# Patient Record
Sex: FEMALE | Race: WHITE | Employment: FULL TIME
[De-identification: names, ages, dates, MRNs, and addresses within clinical notes are randomized per-mention and may not be internally consistent; named-entity substitution may affect disease eponyms.]

---

## 2017-01-24 ENCOUNTER — OFFICE VISIT (OUTPATIENT)
Dept: OBGYN | Facility: CLINIC | Age: 47
End: 2017-01-24

## 2017-01-24 VITALS
DIASTOLIC BLOOD PRESSURE: 70 MMHG | WEIGHT: 199.8 LBS | HEIGHT: 62 IN | BODY MASS INDEX: 36.77 KG/M2 | SYSTOLIC BLOOD PRESSURE: 122 MMHG

## 2017-01-24 PROCEDURE — 99212 OFFICE O/P EST SF 10 MIN: CPT | Performed by: OBSTETRICS & GYNECOLOGY

## 2017-01-24 RX ORDER — PHENTERMINE HYDROCHLORIDE 37.5 MG/1
37.5 TABLET ORAL
Qty: 30 TABLET | Refills: 0 | Status: SHIPPED | OUTPATIENT
Start: 2017-01-24 | End: 2017-02-21 | Stop reason: SDUPTHER

## 2017-02-21 ENCOUNTER — OFFICE VISIT (OUTPATIENT)
Dept: OBGYN | Facility: CLINIC | Age: 47
End: 2017-02-21

## 2017-02-21 VITALS
HEIGHT: 62 IN | BODY MASS INDEX: 35.37 KG/M2 | WEIGHT: 192.2 LBS | DIASTOLIC BLOOD PRESSURE: 70 MMHG | SYSTOLIC BLOOD PRESSURE: 138 MMHG

## 2017-02-21 PROCEDURE — 99211 OFF/OP EST MAY X REQ PHY/QHP: CPT | Performed by: OBSTETRICS & GYNECOLOGY

## 2017-02-21 RX ORDER — PHENTERMINE HYDROCHLORIDE 37.5 MG/1
37.5 TABLET ORAL
Qty: 30 TABLET | Refills: 0 | Status: SHIPPED | OUTPATIENT
Start: 2017-02-21 | End: 2017-03-23

## 2017-05-20 ENCOUNTER — HOSPITAL ENCOUNTER (OUTPATIENT)
Age: 47
Discharge: HOME | End: 2017-05-20
Payer: COMMERCIAL

## 2017-05-20 DIAGNOSIS — E78.5 HYPERLIPIDEMIA, UNSPECIFIED HYPERLIPIDEMIA TYPE: ICD-10-CM

## 2017-05-20 DIAGNOSIS — E11.9 TYPE 2 DIABETES MELLITUS NOT AT GOAL (HCC): ICD-10-CM

## 2017-05-20 LAB
ESTIMATED AVERAGE GLUCOSE: 123 MG/DL
HBA1C MFR BLD: 5.9 % (ref 4.8–5.9)

## 2017-05-20 PROCEDURE — 83036 HEMOGLOBIN GLYCOSYLATED A1C: CPT

## 2017-05-20 PROCEDURE — 36415 COLL VENOUS BLD VENIPUNCTURE: CPT

## 2017-06-07 PROBLEM — E78.5 HYPERLIPIDEMIA: Status: ACTIVE | Noted: 2017-06-07

## 2017-06-10 ENCOUNTER — HOSPITAL ENCOUNTER (OUTPATIENT)
Dept: LAB | Age: 47
Discharge: HOME | End: 2017-06-10
Payer: COMMERCIAL

## 2017-06-10 DIAGNOSIS — E78.5 HYPERLIPIDEMIA, UNSPECIFIED HYPERLIPIDEMIA TYPE: ICD-10-CM

## 2017-06-10 DIAGNOSIS — E11.9 TYPE 2 DIABETES MELLITUS NOT AT GOAL (HCC): ICD-10-CM

## 2017-06-10 LAB
-: ABNORMAL
ALT SERPL-CCNC: 8 U/L (ref 5–33)
AMORPHOUS: ABNORMAL
ANION GAP SERPL CALCULATED.3IONS-SCNC: 14 MMOL/L (ref 9–17)
AST SERPL-CCNC: 11 U/L
BACTERIA: ABNORMAL
BILIRUBIN URINE: NEGATIVE
BUN BLDV-MCNC: 16 MG/DL (ref 6–20)
BUN/CREAT BLD: 33 (ref 9–20)
CALCIUM SERPL-MCNC: 8.9 MG/DL (ref 8.6–10.4)
CASTS UA: ABNORMAL /LPF
CHLORIDE BLD-SCNC: 101 MMOL/L (ref 98–107)
CHOLESTEROL, FASTING: 172 MG/DL
CHOLESTEROL/HDL RATIO: 2.2
CO2: 23 MMOL/L (ref 20–31)
COLOR: YELLOW
COMMENT UA: ABNORMAL
CREAT SERPL-MCNC: 0.48 MG/DL (ref 0.5–0.9)
CREATININE URINE: 127.6 MG/DL (ref 28–217)
CRYSTALS, UA: ABNORMAL /HPF
EPITHELIAL CELLS UA: ABNORMAL /HPF (ref 0–25)
GFR AFRICAN AMERICAN: >60 ML/MIN
GFR NON-AFRICAN AMERICAN: >60 ML/MIN
GFR SERPL CREATININE-BSD FRML MDRD: ABNORMAL ML/MIN/{1.73_M2}
GFR SERPL CREATININE-BSD FRML MDRD: ABNORMAL ML/MIN/{1.73_M2}
GLUCOSE BLD-MCNC: 131 MG/DL (ref 70–99)
GLUCOSE URINE: NEGATIVE
HDLC SERPL-MCNC: 80 MG/DL
KETONES, URINE: NEGATIVE
LDL CHOLESTEROL: 50 MG/DL (ref 0–130)
LEUKOCYTE ESTERASE, URINE: NEGATIVE
MICROALBUMIN/CREAT 24H UR: <12 MG/L
MICROALBUMIN/CREAT UR-RTO: 9 MCG/MG CREAT
MUCUS: ABNORMAL
NITRITE, URINE: NEGATIVE
OTHER OBSERVATIONS UA: ABNORMAL
PH UA: 5 (ref 5–9)
POTASSIUM SERPL-SCNC: 4.5 MMOL/L (ref 3.7–5.3)
PROTEIN UA: NEGATIVE
RBC UA: ABNORMAL /HPF (ref 0–2)
RENAL EPITHELIAL, UA: ABNORMAL /HPF
SODIUM BLD-SCNC: 138 MMOL/L (ref 135–144)
SPECIFIC GRAVITY UA: >1.03 (ref 1.01–1.02)
TRICHOMONAS: ABNORMAL
TRIGLYCERIDE, FASTING: 211 MG/DL
TURBIDITY: CLEAR
URINE HGB: NEGATIVE
UROBILINOGEN, URINE: NORMAL
VLDLC SERPL CALC-MCNC: ABNORMAL MG/DL (ref 1–30)
WBC UA: ABNORMAL /HPF (ref 0–5)
YEAST: ABNORMAL

## 2017-06-10 PROCEDURE — 82043 UR ALBUMIN QUANTITATIVE: CPT

## 2017-06-10 PROCEDURE — 36415 COLL VENOUS BLD VENIPUNCTURE: CPT

## 2017-06-10 PROCEDURE — 82570 ASSAY OF URINE CREATININE: CPT

## 2017-06-10 PROCEDURE — 80061 LIPID PANEL: CPT

## 2017-06-10 PROCEDURE — 81001 URINALYSIS AUTO W/SCOPE: CPT

## 2017-06-10 PROCEDURE — 80048 BASIC METABOLIC PNL TOTAL CA: CPT

## 2017-06-10 PROCEDURE — 84460 ALANINE AMINO (ALT) (SGPT): CPT

## 2017-06-10 PROCEDURE — 84450 TRANSFERASE (AST) (SGOT): CPT

## 2017-11-27 DIAGNOSIS — R53.83 FATIGUE, UNSPECIFIED TYPE: ICD-10-CM

## 2017-11-27 RX ORDER — NORGESTIMATE AND ETHINYL ESTRADIOL 0.25-0.035
1 KIT ORAL DAILY
Qty: 1 PACKET | Refills: 12 | Status: CANCELLED | OUTPATIENT
Start: 2017-11-27

## 2017-11-28 DIAGNOSIS — N92.6 IRREGULAR MENSTRUAL CYCLE: Primary | ICD-10-CM

## 2017-11-28 DIAGNOSIS — R53.83 FATIGUE, UNSPECIFIED TYPE: ICD-10-CM

## 2017-11-28 RX ORDER — NORGESTIMATE AND ETHINYL ESTRADIOL 0.25-0.035
1 KIT ORAL DAILY
Qty: 1 PACKET | Refills: 12 | Status: SHIPPED | OUTPATIENT
Start: 2017-11-28 | End: 2018-12-03 | Stop reason: SDUPTHER

## 2017-12-14 ENCOUNTER — HOSPITAL ENCOUNTER (OUTPATIENT)
Dept: LAB | Age: 47
Discharge: HOME | End: 2017-12-14
Payer: COMMERCIAL

## 2017-12-14 ENCOUNTER — HOSPITAL ENCOUNTER (OUTPATIENT)
Dept: CT IMAGING | Age: 47
Discharge: HOME | End: 2017-12-14
Payer: COMMERCIAL

## 2017-12-14 DIAGNOSIS — Z01.812 PRE-OPERATIVE LABORATORY EXAMINATION: ICD-10-CM

## 2017-12-14 DIAGNOSIS — B35.9 TINEA: ICD-10-CM

## 2017-12-14 LAB
BUN BLDV-MCNC: 14 MG/DL (ref 6–20)
CREAT SERPL-MCNC: 0.56 MG/DL (ref 0.5–0.9)
GFR AFRICAN AMERICAN: >60 ML/MIN
GFR NON-AFRICAN AMERICAN: >60 ML/MIN
GFR SERPL CREATININE-BSD FRML MDRD: NORMAL ML/MIN/{1.73_M2}
GFR SERPL CREATININE-BSD FRML MDRD: NORMAL ML/MIN/{1.73_M2}

## 2017-12-14 PROCEDURE — 84520 ASSAY OF UREA NITROGEN: CPT

## 2017-12-14 PROCEDURE — 6360000004 HC RX CONTRAST MEDICATION: Performed by: INTERNAL MEDICINE

## 2017-12-14 PROCEDURE — 82565 ASSAY OF CREATININE: CPT

## 2017-12-14 PROCEDURE — 70470 CT HEAD/BRAIN W/O & W/DYE: CPT

## 2017-12-14 PROCEDURE — 36415 COLL VENOUS BLD VENIPUNCTURE: CPT

## 2017-12-14 RX ADMIN — IOPAMIDOL 100 ML: 612 INJECTION, SOLUTION INTRAVENOUS at 17:47

## 2017-12-16 ENCOUNTER — HOSPITAL ENCOUNTER (OUTPATIENT)
Dept: LAB | Age: 47
Discharge: HOME | End: 2017-12-16
Payer: COMMERCIAL

## 2017-12-16 LAB
BUN BLDV-MCNC: 13 MG/DL (ref 6–20)
CREAT SERPL-MCNC: 0.49 MG/DL (ref 0.5–0.9)
GFR AFRICAN AMERICAN: >60 ML/MIN
GFR NON-AFRICAN AMERICAN: >60 ML/MIN
GFR SERPL CREATININE-BSD FRML MDRD: ABNORMAL ML/MIN/{1.73_M2}
GFR SERPL CREATININE-BSD FRML MDRD: ABNORMAL ML/MIN/{1.73_M2}

## 2017-12-16 PROCEDURE — 82565 ASSAY OF CREATININE: CPT

## 2017-12-16 PROCEDURE — 36415 COLL VENOUS BLD VENIPUNCTURE: CPT

## 2017-12-16 PROCEDURE — 84520 ASSAY OF UREA NITROGEN: CPT

## 2018-01-15 ENCOUNTER — HOSPITAL ENCOUNTER (OUTPATIENT)
Age: 48
Setting detail: SPECIMEN
Discharge: HOME | End: 2018-01-15
Payer: COMMERCIAL

## 2018-01-15 ENCOUNTER — OFFICE VISIT (OUTPATIENT)
Dept: OBGYN | Age: 48
End: 2018-01-15
Payer: COMMERCIAL

## 2018-01-15 VITALS
BODY MASS INDEX: 37.65 KG/M2 | SYSTOLIC BLOOD PRESSURE: 138 MMHG | DIASTOLIC BLOOD PRESSURE: 72 MMHG | WEIGHT: 199.4 LBS | HEIGHT: 61 IN

## 2018-01-15 DIAGNOSIS — Z12.39 SCREENING FOR BREAST CANCER: ICD-10-CM

## 2018-01-15 DIAGNOSIS — Z01.419 WOMEN'S ANNUAL ROUTINE GYNECOLOGICAL EXAMINATION: ICD-10-CM

## 2018-01-15 DIAGNOSIS — Z01.419 WOMEN'S ANNUAL ROUTINE GYNECOLOGICAL EXAMINATION: Primary | ICD-10-CM

## 2018-01-15 PROCEDURE — G0145 SCR C/V CYTO,THINLAYER,RESCR: HCPCS

## 2018-01-15 PROCEDURE — 99396 PREV VISIT EST AGE 40-64: CPT | Performed by: OBSTETRICS & GYNECOLOGY

## 2018-01-15 NOTE — PROGRESS NOTES
YEARLY PHYSICAL    Date of service: 1/15/2018    Radha Avila  Is a 49 y.o.   female    PT's PCP is: Sofia Vu MD     : 1970                                             Subjective:       Patient's last menstrual period was 2017 (exact date). Are your menses regular: yes    OB History   No data available        History   Smoking Status    Never Smoker   Smokeless Tobacco    Never Used        History   Alcohol Use No       Family History   Problem Relation Age of Onset    Diabetes Maternal Grandfather     Hypertension Father     Kidney Cancer Father     Lung Cancer Father     Heart Surgery Father     Heart Disease Father     Diabetes Mother     Hypertension Mother        Allergies: Cat hair extract; Dust mite extract; and Penicillins      Current Outpatient Prescriptions:     atorvastatin (LIPITOR) 10 MG tablet, TAKE 1 TABLET BY MOUTH ONCE A DAY, Disp: 30 tablet, Rfl: 5    lisinopril (PRINIVIL;ZESTRIL) 10 MG tablet, TAKE 1 TABLET BY MOUTH ONCE A DAY, Disp: 30 tablet, Rfl: 5    norgestimate-ethinyl estradiol (ORTHO-CYCLEN, 28,) 0.25-35 MG-MCG per tablet, Take 1 tablet by mouth daily, Disp: 1 packet, Rfl: 12    amLODIPine (NORVASC) 5 MG tablet, TAKE 1 TABLET BY MOUTH ONCE A DAY, Disp: 30 tablet, Rfl: 5    metFORMIN (GLUCOPHAGE) 500 MG tablet, TAKE 1/2 TABLET BY MOUTH ONCE DAILY, Disp: 15 tablet, Rfl: 5    fluocinonide (LIDEX) 0.05 % cream, Apply topically 2 times daily. , Disp: 1 Tube, Rfl: 2    Lancets MISC, 1 each by Does not apply route daily, Disp: 100 each, Rfl: 3    glucose blood VI test strips (ACCU-CHEK DARRELL) strip, 1 each by In Vitro route daily As needed. , Disp: 100 each, Rfl: 3    Blood Glucose Monitoring Suppl (ACCU-CHEK DARRELL PLUS) W/DEVICE KIT, 1 kit by Does not apply route daily, Disp: 1 kit, Rfl: 0    clotrimazole-betamethasone (LOTRISONE) 1-0.05 % cream, Apply topically 2 times daily. ,

## 2018-01-25 LAB — CYTOLOGY REPORT: NORMAL

## 2018-02-07 ENCOUNTER — HOSPITAL ENCOUNTER (OUTPATIENT)
Dept: WOMENS IMAGING | Age: 48
Discharge: HOME | End: 2018-02-09
Payer: COMMERCIAL

## 2018-02-07 DIAGNOSIS — Z12.39 SCREENING FOR BREAST CANCER: ICD-10-CM

## 2018-02-07 PROCEDURE — 77067 SCR MAMMO BI INCL CAD: CPT

## 2018-02-17 ENCOUNTER — HOSPITAL ENCOUNTER (OUTPATIENT)
Dept: LAB | Age: 48
Discharge: HOME | End: 2018-02-17
Payer: COMMERCIAL

## 2018-02-17 DIAGNOSIS — E11.9 TYPE 2 DIABETES MELLITUS NOT AT GOAL (HCC): ICD-10-CM

## 2018-02-17 DIAGNOSIS — E78.5 HYPERLIPIDEMIA, UNSPECIFIED HYPERLIPIDEMIA TYPE: ICD-10-CM

## 2018-02-17 LAB
CHOLESTEROL, FASTING: 179 MG/DL
CHOLESTEROL/HDL RATIO: 2.5
ESTIMATED AVERAGE GLUCOSE: 123 MG/DL
HBA1C MFR BLD: 5.9 % (ref 4.8–5.9)
HDLC SERPL-MCNC: 72 MG/DL
LDL CHOLESTEROL: 61 MG/DL (ref 0–130)
TRIGLYCERIDE, FASTING: 231 MG/DL
VLDLC SERPL CALC-MCNC: ABNORMAL MG/DL (ref 1–30)

## 2018-02-17 PROCEDURE — 80061 LIPID PANEL: CPT

## 2018-02-17 PROCEDURE — 83036 HEMOGLOBIN GLYCOSYLATED A1C: CPT

## 2018-02-17 PROCEDURE — 36415 COLL VENOUS BLD VENIPUNCTURE: CPT

## 2018-07-02 ENCOUNTER — HOSPITAL ENCOUNTER (OUTPATIENT)
Age: 48
Discharge: HOME | End: 2018-07-02
Payer: COMMERCIAL

## 2018-07-02 DIAGNOSIS — G58.9 NERVE ENTRAPMENT: ICD-10-CM

## 2018-07-02 LAB — TSH SERPL DL<=0.05 MIU/L-ACNC: 2.41 MIU/L (ref 0.3–5)

## 2018-07-02 PROCEDURE — 84443 ASSAY THYROID STIM HORMONE: CPT

## 2018-07-02 PROCEDURE — 36415 COLL VENOUS BLD VENIPUNCTURE: CPT

## 2018-07-14 ENCOUNTER — HOSPITAL ENCOUNTER (OUTPATIENT)
Dept: LAB | Age: 48
Discharge: HOME | End: 2018-07-14
Payer: COMMERCIAL

## 2018-07-14 DIAGNOSIS — E78.5 HYPERLIPIDEMIA, UNSPECIFIED HYPERLIPIDEMIA TYPE: ICD-10-CM

## 2018-07-14 DIAGNOSIS — E11.9 TYPE 2 DIABETES MELLITUS NOT AT GOAL (HCC): ICD-10-CM

## 2018-07-14 LAB
-: ABNORMAL
ALT SERPL-CCNC: 16 U/L (ref 5–33)
AMORPHOUS: ABNORMAL
ANION GAP SERPL CALCULATED.3IONS-SCNC: 12 MMOL/L (ref 9–17)
AST SERPL-CCNC: 21 U/L
BACTERIA: ABNORMAL
BILIRUBIN URINE: NEGATIVE
BUN BLDV-MCNC: 16 MG/DL (ref 6–20)
BUN/CREAT BLD: 30 (ref 9–20)
CALCIUM SERPL-MCNC: 8.8 MG/DL (ref 8.6–10.4)
CASTS UA: ABNORMAL /LPF
CHLORIDE BLD-SCNC: 103 MMOL/L (ref 98–107)
CO2: 25 MMOL/L (ref 20–31)
COLOR: YELLOW
COMMENT UA: ABNORMAL
CREAT SERPL-MCNC: 0.54 MG/DL (ref 0.5–0.9)
CREATININE URINE: 51.6 MG/DL (ref 28–217)
CRYSTALS, UA: ABNORMAL /HPF
EPITHELIAL CELLS UA: ABNORMAL /HPF (ref 0–25)
GFR AFRICAN AMERICAN: >60 ML/MIN
GFR NON-AFRICAN AMERICAN: >60 ML/MIN
GFR SERPL CREATININE-BSD FRML MDRD: ABNORMAL ML/MIN/{1.73_M2}
GFR SERPL CREATININE-BSD FRML MDRD: ABNORMAL ML/MIN/{1.73_M2}
GLUCOSE FASTING: 160 MG/DL (ref 70–99)
GLUCOSE URINE: NEGATIVE
KETONES, URINE: NEGATIVE
LEUKOCYTE ESTERASE, URINE: NEGATIVE
MICROALBUMIN/CREAT 24H UR: <12 MG/L
MICROALBUMIN/CREAT UR-RTO: NORMAL MCG/MG CREAT
MUCUS: ABNORMAL
NITRITE, URINE: NEGATIVE
OTHER OBSERVATIONS UA: ABNORMAL
PH UA: 6 (ref 5–9)
POTASSIUM SERPL-SCNC: 4.4 MMOL/L (ref 3.7–5.3)
PROTEIN UA: NEGATIVE
RBC UA: ABNORMAL /HPF (ref 0–2)
RENAL EPITHELIAL, UA: ABNORMAL /HPF
SODIUM BLD-SCNC: 140 MMOL/L (ref 135–144)
SPECIFIC GRAVITY UA: 1.02 (ref 1.01–1.02)
TRICHOMONAS: ABNORMAL
TURBIDITY: CLEAR
URINE HGB: NEGATIVE
UROBILINOGEN, URINE: NORMAL
WBC UA: ABNORMAL /HPF (ref 0–5)
YEAST: ABNORMAL

## 2018-07-14 PROCEDURE — 82570 ASSAY OF URINE CREATININE: CPT

## 2018-07-14 PROCEDURE — 82043 UR ALBUMIN QUANTITATIVE: CPT

## 2018-07-14 PROCEDURE — 84450 TRANSFERASE (AST) (SGOT): CPT

## 2018-07-14 PROCEDURE — 36415 COLL VENOUS BLD VENIPUNCTURE: CPT

## 2018-07-14 PROCEDURE — 84460 ALANINE AMINO (ALT) (SGPT): CPT

## 2018-07-14 PROCEDURE — 81001 URINALYSIS AUTO W/SCOPE: CPT

## 2018-07-14 PROCEDURE — 80048 BASIC METABOLIC PNL TOTAL CA: CPT

## 2018-09-08 ENCOUNTER — HOSPITAL ENCOUNTER (OUTPATIENT)
Dept: LAB | Age: 48
Discharge: HOME | End: 2018-09-08
Payer: COMMERCIAL

## 2018-09-08 DIAGNOSIS — E78.5 HYPERLIPIDEMIA, UNSPECIFIED HYPERLIPIDEMIA TYPE: ICD-10-CM

## 2018-09-08 LAB
CHOLESTEROL, FASTING: 183 MG/DL
CHOLESTEROL/HDL RATIO: 2.5
HDLC SERPL-MCNC: 73 MG/DL
LDL CHOLESTEROL: 65 MG/DL (ref 0–130)
TRIGLYCERIDE, FASTING: 227 MG/DL
VLDLC SERPL CALC-MCNC: ABNORMAL MG/DL (ref 1–30)

## 2018-09-08 PROCEDURE — 80061 LIPID PANEL: CPT

## 2018-09-08 PROCEDURE — 36415 COLL VENOUS BLD VENIPUNCTURE: CPT

## 2018-12-03 DIAGNOSIS — N92.6 IRREGULAR MENSTRUAL CYCLE: ICD-10-CM

## 2018-12-03 RX ORDER — NORGESTIMATE AND ETHINYL ESTRADIOL 0.25-0.035
1 KIT ORAL DAILY
Qty: 1 PACKET | Refills: 12 | Status: SHIPPED | OUTPATIENT
Start: 2018-12-03 | End: 2019-12-04 | Stop reason: SDUPTHER

## 2019-01-21 ENCOUNTER — HOSPITAL ENCOUNTER (OUTPATIENT)
Age: 49
Setting detail: SPECIMEN
Discharge: HOME | End: 2019-01-21
Payer: COMMERCIAL

## 2019-01-21 ENCOUNTER — OFFICE VISIT (OUTPATIENT)
Dept: OBGYN | Age: 49
End: 2019-01-21
Payer: COMMERCIAL

## 2019-01-21 VITALS
BODY MASS INDEX: 37.91 KG/M2 | SYSTOLIC BLOOD PRESSURE: 138 MMHG | HEIGHT: 62 IN | WEIGHT: 206 LBS | DIASTOLIC BLOOD PRESSURE: 64 MMHG

## 2019-01-21 DIAGNOSIS — Z12.39 SCREENING FOR BREAST CANCER: ICD-10-CM

## 2019-01-21 DIAGNOSIS — Z01.419 WOMEN'S ANNUAL ROUTINE GYNECOLOGICAL EXAMINATION: Primary | ICD-10-CM

## 2019-01-21 DIAGNOSIS — Z01.419 WOMEN'S ANNUAL ROUTINE GYNECOLOGICAL EXAMINATION: ICD-10-CM

## 2019-01-21 PROCEDURE — G8484 FLU IMMUNIZE NO ADMIN: HCPCS | Performed by: OBSTETRICS & GYNECOLOGY

## 2019-01-21 PROCEDURE — 99396 PREV VISIT EST AGE 40-64: CPT | Performed by: OBSTETRICS & GYNECOLOGY

## 2019-01-21 PROCEDURE — G0145 SCR C/V CYTO,THINLAYER,RESCR: HCPCS

## 2019-01-21 RX ORDER — INFLUENZA VIRUS VACCINE 15; 15; 15; 15 UG/.5ML; UG/.5ML; UG/.5ML; UG/.5ML
SUSPENSION INTRAMUSCULAR
Refills: 0 | COMMUNITY
Start: 2018-11-02 | End: 2020-02-18

## 2019-01-22 LAB — COMMENT: NORMAL

## 2019-02-05 LAB — CYTOLOGY REPORT: NORMAL

## 2019-03-30 ENCOUNTER — HOSPITAL ENCOUNTER (OUTPATIENT)
Dept: LAB | Age: 49
Discharge: HOME | End: 2019-03-30
Payer: COMMERCIAL

## 2019-03-30 DIAGNOSIS — E78.5 HYPERLIPIDEMIA, UNSPECIFIED HYPERLIPIDEMIA TYPE: ICD-10-CM

## 2019-03-30 LAB
CHOLESTEROL, FASTING: 261 MG/DL
CHOLESTEROL/HDL RATIO: 6.5
HDLC SERPL-MCNC: 40 MG/DL
LDL CHOLESTEROL DIRECT: 35 MG/DL
LDL CHOLESTEROL: ABNORMAL MG/DL (ref 0–130)
TRIGLYCERIDE, FASTING: 1494 MG/DL
VLDLC SERPL CALC-MCNC: ABNORMAL MG/DL (ref 1–30)

## 2019-03-30 PROCEDURE — 83721 ASSAY OF BLOOD LIPOPROTEIN: CPT

## 2019-03-30 PROCEDURE — 36415 COLL VENOUS BLD VENIPUNCTURE: CPT

## 2019-03-30 PROCEDURE — 80061 LIPID PANEL: CPT

## 2019-04-01 ENCOUNTER — HOSPITAL ENCOUNTER (OUTPATIENT)
Age: 49
Discharge: HOME | End: 2019-04-01
Payer: COMMERCIAL

## 2019-04-01 DIAGNOSIS — E78.2 MIXED HYPERLIPIDEMIA: ICD-10-CM

## 2019-04-01 LAB
ALT SERPL-CCNC: 12 U/L (ref 5–33)
AST SERPL-CCNC: 12 U/L

## 2019-04-01 PROCEDURE — 84450 TRANSFERASE (AST) (SGOT): CPT

## 2019-04-01 PROCEDURE — 36415 COLL VENOUS BLD VENIPUNCTURE: CPT

## 2019-04-01 PROCEDURE — 84460 ALANINE AMINO (ALT) (SGPT): CPT

## 2019-04-13 ENCOUNTER — HOSPITAL ENCOUNTER (OUTPATIENT)
Dept: WOMENS IMAGING | Age: 49
Discharge: HOME | End: 2019-04-15
Payer: COMMERCIAL

## 2019-04-13 DIAGNOSIS — Z12.39 SCREENING FOR BREAST CANCER: ICD-10-CM

## 2019-04-13 PROCEDURE — 77063 BREAST TOMOSYNTHESIS BI: CPT

## 2019-07-20 ENCOUNTER — HOSPITAL ENCOUNTER (OUTPATIENT)
Dept: LAB | Age: 49
Discharge: HOME | End: 2019-07-20
Payer: COMMERCIAL

## 2019-07-20 DIAGNOSIS — E78.5 HYPERLIPIDEMIA, UNSPECIFIED HYPERLIPIDEMIA TYPE: ICD-10-CM

## 2019-07-20 DIAGNOSIS — E78.2 MIXED HYPERLIPIDEMIA: ICD-10-CM

## 2019-07-20 LAB
ALT SERPL-CCNC: 26 U/L (ref 5–33)
AST SERPL-CCNC: 28 U/L
CHOLESTEROL, FASTING: 156 MG/DL
CHOLESTEROL/HDL RATIO: 2.8
HDLC SERPL-MCNC: 56 MG/DL
LDL CHOLESTEROL: 50 MG/DL (ref 0–130)
TRIGLYCERIDE, FASTING: 250 MG/DL
VLDLC SERPL CALC-MCNC: ABNORMAL MG/DL (ref 1–30)

## 2019-07-20 PROCEDURE — 84460 ALANINE AMINO (ALT) (SGPT): CPT

## 2019-07-20 PROCEDURE — 80061 LIPID PANEL: CPT

## 2019-07-20 PROCEDURE — 36415 COLL VENOUS BLD VENIPUNCTURE: CPT

## 2019-07-20 PROCEDURE — 84450 TRANSFERASE (AST) (SGOT): CPT

## 2019-12-04 DIAGNOSIS — N92.6 IRREGULAR MENSTRUAL CYCLE: ICD-10-CM

## 2019-12-04 RX ORDER — NORGESTIMATE AND ETHINYL ESTRADIOL 0.25-0.035
1 KIT ORAL DAILY
Qty: 1 PACKET | Refills: 12 | Status: SHIPPED
Start: 2019-12-04 | End: 2020-02-17 | Stop reason: SDUPTHER

## 2020-02-06 ENCOUNTER — HOSPITAL ENCOUNTER (OUTPATIENT)
Age: 50
Setting detail: SPECIMEN
Discharge: HOME | End: 2020-02-06
Payer: COMMERCIAL

## 2020-02-06 ENCOUNTER — OFFICE VISIT (OUTPATIENT)
Dept: OBGYN | Age: 50
End: 2020-02-06
Payer: COMMERCIAL

## 2020-02-06 VITALS
SYSTOLIC BLOOD PRESSURE: 122 MMHG | BODY MASS INDEX: 39.75 KG/M2 | HEIGHT: 62 IN | WEIGHT: 216 LBS | DIASTOLIC BLOOD PRESSURE: 72 MMHG

## 2020-02-06 PROCEDURE — G0145 SCR C/V CYTO,THINLAYER,RESCR: HCPCS

## 2020-02-06 PROCEDURE — 99396 PREV VISIT EST AGE 40-64: CPT | Performed by: OBSTETRICS & GYNECOLOGY

## 2020-02-06 NOTE — PROGRESS NOTES
YEARLY PHYSICAL    Date of service: 2020    Jacob Dear  Is a 47 y.o.   female    PT's PCP is: Rohini Zabala MD     : 1970                                             Subjective:       Patient's last menstrual period was 2020 (approximate). Are your menses regular: yes    OB History   No obstetric history on file.         Social History     Tobacco Use   Smoking Status Never Smoker   Smokeless Tobacco Never Used        Social History     Substance and Sexual Activity   Alcohol Use No       Family History   Problem Relation Age of Onset    Diabetes Maternal Grandfather     Hypertension Father     Kidney Cancer Father     Lung Cancer Father     Heart Surgery Father     Heart Disease Father     Diabetes Mother     Hypertension Mother        Allergies: Cat hair extract; Dust mite extract; and Penicillins      Current Outpatient Medications:     norgestimate-ethinyl estradiol (ORTHO-CYCLEN, 28,) 0.25-35 MG-MCG per tablet, Take 1 tablet by mouth daily, Disp: 1 packet, Rfl: 12    lisinopril (PRINIVIL;ZESTRIL) 10 MG tablet, TAKE 1 TABLET BY MOUTH ONCE A DAY, Disp: 30 tablet, Rfl: 5    amLODIPine (NORVASC) 5 MG tablet, TAKE 1 TABLET BY MOUTH ONCE A DAY, Disp: 30 tablet, Rfl: 5    atorvastatin (LIPITOR) 40 MG tablet, TAKE 1 TABLET BY MOUTH ONCE A DAY, Disp: 30 tablet, Rfl: 5    metFORMIN (GLUCOPHAGE) 500 MG tablet, TAKE 1/2 TABLET BY MOUTH ONCE DAILY, Disp: 15 tablet, Rfl: 5    lisinopril (PRINIVIL;ZESTRIL) 10 MG tablet, TAKE 1 TABLET BY MOUTH ONCE A DAY, Disp: 30 tablet, Rfl: 5    atorvastatin (LIPITOR) 10 MG tablet, TAKE 1 TABLET BY MOUTH ONCE A DAY, Disp: 30 tablet, Rfl: 5    FLUARIX QUADRIVALENT 0.5 ML injection, inject 0.5 milliliter intramuscularly, Disp: , Rfl: 0    lisinopril (PRINIVIL;ZESTRIL) 10 MG tablet, TAKE 1 TABLET BY MOUTH ONCE A DAY, Disp: 30 tablet, Rfl: 5    Lancets MISC, 1 each by Does not and rhythm, no murmurs              Pul:clear to auscultation bilaterally- no wheezes, rales or rhonchi, normal air movement, no respiratory distress      GI: Abdomen soft, non-tender. BS normal. No masses,  No organomegaly, obesity noted, old well-healed scar on lower abdomen           Extremities: normal strength, tone, and muscle mass   Breasts: Breast:normal appearance, no masses or tenderness, Inspection negative, No nipple retraction or dimpling, No nipple discharge or bleeding, No axillary or supraclavicular adenopathy, Normal to palpation without dominant masses   Pelvic Exam: GENITAL/URINARY:  External Genitalia:  General appearance; normal, Hair distribution; normal, Lesions absent  Vagina:  General appearance normal, Estrogen effect normal, Discharge absent, Lesions absent, Pelvic support normal  Cervix:  General appearance normal, Lesions absent, Discharge absent, Tenderness absent, Enlargement absent, Nodularity absent  Uterus:  Size normal, Contour normal, Position normal, Masses absent, Consistency; normal, Support normal, Tenderness absent  [place]: Masses absent, Tenderness absent, Enlargement absent, Nodularity absent                                      Vaginal discharge: no vaginal discharge      Uterus: normal size, anteverted, mobile, non-tender, normal shape and consistency     Ovaries: Nonenlarged nontender           Over 50% of time spent on counseling and care coordination on: see assessment and plan                        Assessment and Plan: Discussion regarding menopause. Patient is to go off the birth control pills at some point in the near future        Diagnosis Orders   1. Encounter for screening mammogram for breast cancer  [place] DIGITAL SCREEN W CAD BILATERAL   2. Well woman exam with routine gynecological exam  PAP SMEAR       I am having Padmini Friend.  [employer] maintain her Lancets, blood glucose test strips, Accu-Chek [place] Plus, lisinopril, Fluarix Quadrivalent, atorvastatin, lisinopril, metFORMIN, atorvastatin, amLODIPine, lisinopril, and norgestimate-ethinyl estradiol.

## 2020-02-16 ENCOUNTER — HOSPITAL ENCOUNTER (EMERGENCY)
Age: 50
Discharge: HOME | End: 2020-02-16
Attending: EMERGENCY MEDICINE
Payer: COMMERCIAL

## 2020-02-16 ENCOUNTER — TELEPHONE (OUTPATIENT)
Dept: OBGYN | Age: 50
End: 2020-02-16

## 2020-02-16 VITALS
OXYGEN SATURATION: 97 % | TEMPERATURE: 97.8 F | BODY MASS INDEX: 39.14 KG/M2 | WEIGHT: 214 LBS | HEART RATE: 95 BPM | SYSTOLIC BLOOD PRESSURE: 191 MMHG | RESPIRATION RATE: 16 BRPM | DIASTOLIC BLOOD PRESSURE: 105 MMHG

## 2020-02-16 LAB
ABSOLUTE EOS #: 0.24 K/UL (ref 0–0.44)
ABSOLUTE IMMATURE GRANULOCYTE: 0.07 K/UL (ref 0–0.3)
ABSOLUTE LYMPH #: 2.9 K/UL (ref 1.1–3.7)
ABSOLUTE MONO #: 0.87 K/UL (ref 0.1–1.2)
ANION GAP SERPL CALCULATED.3IONS-SCNC: 15 MMOL/L (ref 9–17)
BASOPHILS # BLD: 1 % (ref 0–2)
BASOPHILS ABSOLUTE: 0.11 K/UL (ref 0–0.2)
BUN BLDV-MCNC: 10 MG/DL (ref 6–20)
BUN/CREAT BLD: 20 (ref 9–20)
CALCIUM SERPL-MCNC: 8.9 MG/DL (ref 8.6–10.4)
CHLORIDE BLD-SCNC: 99 MMOL/L (ref 98–107)
CO2: 21 MMOL/L (ref 20–31)
CREAT SERPL-MCNC: 0.5 MG/DL (ref 0.5–0.9)
DIFFERENTIAL TYPE: ABNORMAL
EOSINOPHILS RELATIVE PERCENT: 2 % (ref 1–4)
GFR AFRICAN AMERICAN: >60 ML/MIN
GFR NON-AFRICAN AMERICAN: >60 ML/MIN
GFR SERPL CREATININE-BSD FRML MDRD: ABNORMAL ML/MIN/{1.73_M2}
GFR SERPL CREATININE-BSD FRML MDRD: ABNORMAL ML/MIN/{1.73_M2}
GLUCOSE BLD-MCNC: 166 MG/DL (ref 70–99)
HCG QUALITATIVE: NEGATIVE
HCT VFR BLD CALC: 39.3 % (ref 36.3–47.1)
HEMOGLOBIN: 12.2 G/DL (ref 11.9–15.1)
IMMATURE GRANULOCYTES: 1 %
INR BLD: 1 (ref 0.9–1.2)
LYMPHOCYTES # BLD: 19 % (ref 24–43)
MCH RBC QN AUTO: 28.1 PG (ref 25.2–33.5)
MCHC RBC AUTO-ENTMCNC: 31 G/DL (ref 28.4–34.8)
MCV RBC AUTO: 90.6 FL (ref 82.6–102.9)
MONOCYTES # BLD: 6 % (ref 3–12)
NRBC AUTOMATED: 0 PER 100 WBC
PARTIAL THROMBOPLASTIN TIME: 26.1 SEC (ref 23.2–34.4)
PDW BLD-RTO: 13.3 % (ref 11.8–14.4)
PLATELET # BLD: 369 K/UL (ref 138–453)
PLATELET ESTIMATE: ABNORMAL
PMV BLD AUTO: 9.7 FL (ref 8.1–13.5)
POTASSIUM SERPL-SCNC: 4.6 MMOL/L (ref 3.7–5.3)
PROTHROMBIN TIME: 9.8 SEC (ref 9.7–12.2)
RBC # BLD: 4.34 M/UL (ref 3.95–5.11)
RBC # BLD: ABNORMAL 10*6/UL
SEG NEUTROPHILS: 71 % (ref 36–65)
SEGMENTED NEUTROPHILS ABSOLUTE COUNT: 11.19 K/UL (ref 1.5–8.1)
SODIUM BLD-SCNC: 135 MMOL/L (ref 135–144)
WBC # BLD: 15.4 K/UL (ref 3.5–11.3)
WBC # BLD: ABNORMAL 10*3/UL

## 2020-02-16 PROCEDURE — 84703 CHORIONIC GONADOTROPIN ASSAY: CPT

## 2020-02-16 PROCEDURE — 6360000002 HC RX W HCPCS: Performed by: EMERGENCY MEDICINE

## 2020-02-16 PROCEDURE — 85610 PROTHROMBIN TIME: CPT

## 2020-02-16 PROCEDURE — 96361 HYDRATE IV INFUSION ADD-ON: CPT

## 2020-02-16 PROCEDURE — 80048 BASIC METABOLIC PNL TOTAL CA: CPT

## 2020-02-16 PROCEDURE — 99284 EMERGENCY DEPT VISIT MOD MDM: CPT

## 2020-02-16 PROCEDURE — 85025 COMPLETE CBC W/AUTO DIFF WBC: CPT

## 2020-02-16 PROCEDURE — 96375 TX/PRO/DX INJ NEW DRUG ADDON: CPT

## 2020-02-16 PROCEDURE — 2580000003 HC RX 258: Performed by: EMERGENCY MEDICINE

## 2020-02-16 PROCEDURE — 85730 THROMBOPLASTIN TIME PARTIAL: CPT

## 2020-02-16 PROCEDURE — 96374 THER/PROPH/DIAG INJ IV PUSH: CPT

## 2020-02-16 RX ORDER — 0.9 % SODIUM CHLORIDE 0.9 %
1000 INTRAVENOUS SOLUTION INTRAVENOUS ONCE
Status: COMPLETED | OUTPATIENT
Start: 2020-02-16 | End: 2020-02-16

## 2020-02-16 RX ORDER — MORPHINE SULFATE 4 MG/ML
4 INJECTION, SOLUTION INTRAMUSCULAR; INTRAVENOUS ONCE
Status: COMPLETED | OUTPATIENT
Start: 2020-02-16 | End: 2020-02-16

## 2020-02-16 RX ORDER — ONDANSETRON 2 MG/ML
4 INJECTION INTRAMUSCULAR; INTRAVENOUS ONCE
Status: COMPLETED | OUTPATIENT
Start: 2020-02-16 | End: 2020-02-16

## 2020-02-16 RX ORDER — MEDROXYPROGESTERONE ACETATE 10 MG/1
10 TABLET ORAL ONCE
Status: DISCONTINUED | OUTPATIENT
Start: 2020-02-16 | End: 2020-02-16 | Stop reason: ALTCHOICE

## 2020-02-16 RX ORDER — MEDROXYPROGESTERONE ACETATE 10 MG/1
10 TABLET ORAL DAILY
Qty: 7 TABLET | Refills: 0 | Status: SHIPPED | OUTPATIENT
Start: 2020-02-16 | End: 2020-02-17 | Stop reason: SDUPTHER

## 2020-02-16 RX ADMIN — ONDANSETRON 4 MG: 2 INJECTION INTRAMUSCULAR; INTRAVENOUS at 10:35

## 2020-02-16 RX ADMIN — MORPHINE SULFATE 4 MG: 4 INJECTION, SOLUTION INTRAMUSCULAR; INTRAVENOUS at 10:36

## 2020-02-16 RX ADMIN — SODIUM CHLORIDE 1000 ML: 9 INJECTION, SOLUTION INTRAVENOUS at 10:35

## 2020-02-16 ASSESSMENT — PAIN SCALES - GENERAL
PAINLEVEL_OUTOF10: 3
PAINLEVEL_OUTOF10: 0

## 2020-02-16 NOTE — ED PROVIDER NOTES
[facility] ED  EMERGENCY DEPARTMENT ENCOUNTER      Pt Name: Carol Douglas  MRN: [id number]  [place] 1970  Date of evaluation: 2020  Provider: Stacey Terrell MD    [address] [place] [address]     Chief Complaint   Patient presents with    Vaginal Bleeding     pt states ongoing for a few day    Abdominal Pain     lower abd cramping         HISTORY OF PRESENT ILLNESS   (Location/Symptom, Timing/Onset, Context/Setting,Quality, Duration, Modifying Factors, Severity)  Note limiting factors. Carol Douglas is a 47 y.o. female who presents to the emergency department with a chief complaint of vaginal bleeding that started 2 nights ago and became worse last night with the passage of clots and lower abdominal cramping. Patient is on birth control pills and is on the third week of her current pack. She states that her most recent prescription was a different brand name but had the same hormone content. She has a history of fibroids and has been on birth control pills for the last 3 to 4 years because of heavy bleeding. She had opted to use hormonal therapy instead of hysterectomy. The history is provided by the patient. Nursing Notes werereviewed. REVIEW OF SYSTEMS    (2-9 systems for level 4, 10 or more for level 5)     Review of Systems   All other systems reviewed and are negative. Except as noted above the remainder of the review of systems was reviewed and negative.        PAST MEDICAL HISTORY     Past Medical History:   Diagnosis Date    Diabetes mellitus ([address]     Hypertension          SURGICALHISTORY       Past Surgical History:   Procedure Laterality Date    APPENDECTOMY       SECTION      LEEP      vaginal warts         CURRENT MEDICATIONS       Previous Medications    AMLODIPINE (NORVASC) 5 MG TABLET    TAKE 1 TABLET BY MOUTH ONCE A DAY    ATORVASTATIN (LIPITOR) 10 MG TABLET    TAKE 1 TABLET BY MOUTH ONCE A DAY    ATORVASTATIN (LIPITOR) 40 MG TABLET    TAKE 1 TABLET BY Relationship status: None    Intimate partner violence:     Fear of current or ex partner: None     Emotionally abused: None     Physically abused: None     Forced sexual activity: None   Other Topics Concern    None   Social History Narrative    None       SCREENINGS    [place] Coma Scale  Eye Opening: Spontaneous  Best Verbal Response: Oriented  Best Motor Response: Obeys commands  Leia Coma Scale Score: 15        PHYSICAL EXAM    (up to 7 for level 4, 8 or more for level 5)     ED Triage Vitals [02/16/20 0949]   BP Temp Temp Source Pulse Resp SpO2 Height Weight   (!) 191/105 97.8 °F (36.6 °C) Tympanic 95 16 97 % -- 214 lb (97.1 kg)       Physical Exam  Vitals signs reviewed. Constitutional:       General: She is not in acute distress. Appearance: She is obese. She is not ill-appearing. HENT:      Head: Normocephalic. Right Ear: External ear normal.      Left Ear: External ear normal.      Nose: Nose normal.      Mouth/Throat:      Mouth: Mucous membranes are moist.      Pharynx: No posterior oropharyngeal erythema. Eyes:      Extraocular Movements: Extraocular movements intact. Neck:      Musculoskeletal: Normal range of motion and neck supple. Cardiovascular:      Rate and Rhythm: Normal rate and regular rhythm. Heart sounds: Normal heart sounds. Pulmonary:      Effort: Pulmonary effort is normal.      Breath sounds: Normal breath sounds. No rhonchi or rales. Abdominal:      Palpations: Abdomen is soft. There is no mass. Tenderness: There is no abdominal tenderness. Musculoskeletal: Normal range of motion. Skin:     General: Skin is warm and dry. Coloration: Skin is not pale. Findings: No rash. Neurological:      General: No focal deficit present. Mental Status: She is alert. Mental status is at baseline.          DIAGNOSTIC RESULTS     EKG: All EKG's are interpreted by the Emergency Department Physician who either signs orCo-signs this chart in the

## 2020-02-17 ENCOUNTER — OFFICE VISIT (OUTPATIENT)
Dept: OBGYN | Age: 50
End: 2020-02-17
Payer: COMMERCIAL

## 2020-02-17 VITALS
BODY MASS INDEX: 39.93 KG/M2 | SYSTOLIC BLOOD PRESSURE: 134 MMHG | DIASTOLIC BLOOD PRESSURE: 68 MMHG | HEIGHT: 62 IN | WEIGHT: 217 LBS

## 2020-02-17 PROCEDURE — 99212 OFFICE O/P EST SF 10 MIN: CPT | Performed by: OBSTETRICS & GYNECOLOGY

## 2020-02-17 RX ORDER — MEDROXYPROGESTERONE ACETATE 10 MG/1
TABLET ORAL
COMMUNITY
Start: 2020-02-16 | End: 2020-05-18

## 2020-02-17 RX ORDER — NORGESTIMATE AND ETHINYL ESTRADIOL 0.25-0.035
1 KIT ORAL
COMMUNITY
Start: 2019-12-04 | End: 2020-02-18

## 2020-02-17 NOTE — PROGRESS NOTES
ER F/U for heavy bleeding          NURSE: RUBEN    PE:  Vital Signs  Blood pressure 134/68, height 5' 2\" (1.575 m), weight 217 lb (98.4 kg), last menstrual period 02/14/2020, not currently breastfeeding. Labs:    No results found for this visit on 02/17/20. NURSE: elio    HPI: The patient is here today as a follow-up from the emergency room where she had heavy bleeding. Has known uterine fibroids. The patient attributes the fact that they did not have the birth control pill that she had been using and they gave her a different one. No  PT denies fever, chills, nausea and vomiting       Objective: Vital signs and lab work from the emergency room reviewed. Assessment and Plan: Discussion with the patient she is going to finish her course of Provera given in the emergency room. She is going to discontinue oral contraceptives. We are going to obtain another ultrasound of the uterus. If there are no changes in the uterine fibroids consider use of Lysteda          Diagnosis Orders   1. Menorrhalgia     2. Intramural leiomyoma of uterus               No follow-ups on file. FF: 10 minutes    There are no Patient Instructions on file for this visit. Over 75%of time spent on counseling and care coordination on: see assessment and plan,  She was also counseled on her preventative health maintenance recommendations and follow-up.       Jeison Martinez,2/17/2020 10:40 AM

## 2020-02-18 ENCOUNTER — HOSPITAL ENCOUNTER (EMERGENCY)
Age: 50
Discharge: HOME | End: 2020-02-18
Attending: EMERGENCY MEDICINE
Payer: COMMERCIAL

## 2020-02-18 ENCOUNTER — APPOINTMENT (OUTPATIENT)
Dept: ULTRASOUND IMAGING | Age: 50
End: 2020-02-18
Payer: COMMERCIAL

## 2020-02-18 ENCOUNTER — TELEPHONE (OUTPATIENT)
Dept: OBGYN | Age: 50
End: 2020-02-18

## 2020-02-18 VITALS
OXYGEN SATURATION: 98 % | RESPIRATION RATE: 18 BRPM | TEMPERATURE: 97.4 F | SYSTOLIC BLOOD PRESSURE: 140 MMHG | BODY MASS INDEX: 39.14 KG/M2 | WEIGHT: 214 LBS | HEART RATE: 90 BPM | DIASTOLIC BLOOD PRESSURE: 80 MMHG

## 2020-02-18 LAB
ABO/RH: NORMAL
ABSOLUTE EOS #: 0.22 K/UL (ref 0–0.44)
ABSOLUTE IMMATURE GRANULOCYTE: 0.1 K/UL (ref 0–0.3)
ABSOLUTE LYMPH #: 2.96 K/UL (ref 1.1–3.7)
ABSOLUTE MONO #: 0.81 K/UL (ref 0.1–1.2)
ANION GAP SERPL CALCULATED.3IONS-SCNC: 14 MMOL/L (ref 9–17)
ANTIBODY SCREEN: NEGATIVE
ARM BAND NUMBER: NORMAL
BASOPHILS # BLD: 0 % (ref 0–2)
BASOPHILS ABSOLUTE: 0.07 K/UL (ref 0–0.2)
BUN BLDV-MCNC: 9 MG/DL (ref 6–20)
BUN/CREAT BLD: 19 (ref 9–20)
CALCIUM SERPL-MCNC: 8.7 MG/DL (ref 8.6–10.4)
CHLORIDE BLD-SCNC: 99 MMOL/L (ref 98–107)
CO2: 21 MMOL/L (ref 20–31)
CREAT SERPL-MCNC: 0.47 MG/DL (ref 0.5–0.9)
DIFFERENTIAL TYPE: ABNORMAL
EOSINOPHILS RELATIVE PERCENT: 1 % (ref 1–4)
EXPIRATION DATE: NORMAL
GFR AFRICAN AMERICAN: >60 ML/MIN
GFR NON-AFRICAN AMERICAN: >60 ML/MIN
GFR SERPL CREATININE-BSD FRML MDRD: ABNORMAL ML/MIN/{1.73_M2}
GFR SERPL CREATININE-BSD FRML MDRD: ABNORMAL ML/MIN/{1.73_M2}
GLUCOSE BLD-MCNC: 209 MG/DL (ref 70–99)
HCT VFR BLD CALC: 32 % (ref 36.3–47.1)
HEMOGLOBIN: 10.1 G/DL (ref 11.9–15.1)
IMMATURE GRANULOCYTES: 1 %
LYMPHOCYTES # BLD: 18 % (ref 24–43)
MCH RBC QN AUTO: 28.1 PG (ref 25.2–33.5)
MCHC RBC AUTO-ENTMCNC: 31.6 G/DL (ref 28.4–34.8)
MCV RBC AUTO: 89.1 FL (ref 82.6–102.9)
MONOCYTES # BLD: 5 % (ref 3–12)
NRBC AUTOMATED: 0 PER 100 WBC
PDW BLD-RTO: 13.6 % (ref 11.8–14.4)
PLATELET # BLD: 358 K/UL (ref 138–453)
PLATELET ESTIMATE: ABNORMAL
PMV BLD AUTO: 9.8 FL (ref 8.1–13.5)
POTASSIUM SERPL-SCNC: 4.1 MMOL/L (ref 3.7–5.3)
RBC # BLD: 3.59 M/UL (ref 3.95–5.11)
RBC # BLD: ABNORMAL 10*6/UL
SEG NEUTROPHILS: 75 % (ref 36–65)
SEGMENTED NEUTROPHILS ABSOLUTE COUNT: 11.92 K/UL (ref 1.5–8.1)
SODIUM BLD-SCNC: 134 MMOL/L (ref 135–144)
WBC # BLD: 16.1 K/UL (ref 3.5–11.3)
WBC # BLD: ABNORMAL 10*3/UL

## 2020-02-18 PROCEDURE — 2580000003 HC RX 258: Performed by: EMERGENCY MEDICINE

## 2020-02-18 PROCEDURE — 85025 COMPLETE CBC W/AUTO DIFF WBC: CPT

## 2020-02-18 PROCEDURE — 86850 RBC ANTIBODY SCREEN: CPT

## 2020-02-18 PROCEDURE — 36415 COLL VENOUS BLD VENIPUNCTURE: CPT

## 2020-02-18 PROCEDURE — 86900 BLOOD TYPING SEROLOGIC ABO: CPT

## 2020-02-18 PROCEDURE — 86901 BLOOD TYPING SEROLOGIC RH(D): CPT

## 2020-02-18 PROCEDURE — 2500000003 HC RX 250 WO HCPCS: Performed by: EMERGENCY MEDICINE

## 2020-02-18 PROCEDURE — 76830 TRANSVAGINAL US NON-OB: CPT

## 2020-02-18 PROCEDURE — 96365 THER/PROPH/DIAG IV INF INIT: CPT

## 2020-02-18 PROCEDURE — 80048 BASIC METABOLIC PNL TOTAL CA: CPT

## 2020-02-18 PROCEDURE — 99284 EMERGENCY DEPT VISIT MOD MDM: CPT

## 2020-02-18 RX ORDER — TRANEXAMIC ACID 650 1/1
1300 TABLET ORAL 3 TIMES DAILY
Qty: 30 TABLET | Refills: 0 | Status: SHIPPED | OUTPATIENT
Start: 2020-02-18 | End: 2020-09-29 | Stop reason: ALTCHOICE

## 2020-02-18 RX ORDER — SODIUM CHLORIDE 9 MG/ML
250 INJECTION, SOLUTION INTRAVENOUS CONTINUOUS
Status: DISCONTINUED | OUTPATIENT
Start: 2020-02-18 | End: 2020-02-18 | Stop reason: HOSPADM

## 2020-02-18 RX ADMIN — TRANEXAMIC ACID 1000 MG: 1 INJECTION, SOLUTION INTRAVENOUS at 13:34

## 2020-02-18 RX ADMIN — SODIUM CHLORIDE 250 ML/HR: 9 INJECTION, SOLUTION INTRAVENOUS at 12:05

## 2020-02-18 ASSESSMENT — ENCOUNTER SYMPTOMS
WHEEZING: 0
ABDOMINAL PAIN: 0
ABDOMINAL DISTENTION: 0
VOMITING: 0
DIARRHEA: 0
NAUSEA: 0
SHORTNESS OF BREATH: 0
COUGH: 0
BACK PAIN: 0
CONSTIPATION: 0

## 2020-02-18 NOTE — LETTER
[facility] ED  [address] [place] [address] [place] [address]  Phone: [phone]  Fax: [phone]               February 18, 2020    Patient: Juwan Morales   YOB: 1970   Date of Visit: 2/18/2020       To Whom It May Concern:    Chika Shearer was seen and treated in our emergency department on 2/18/2020. She may return to work on 2/20/2020.       Sincerely,       Amber Sarmiento RN         Signature:__________________________________

## 2020-02-18 NOTE — ED NOTES
Dr Veronica Julian called at office, he is at [facility].  Call transferred to [facility], call connected with Dr Alek Balderrama  02/18/20 1861

## 2020-02-18 NOTE — ED PROVIDER NOTES
[facility] ED  EMERGENCY DEPARTMENT ENCOUNTER      Pt Name:Shelley Eng  MRN: [id number]  [place] 1970  Date of evaluation: 2/18/2020  Provider: Norma Restrepo MD    [address] [place] [address]     Chief Complaint   Patient presents with    Vaginal Bleeding     pt states onset Friday. Pt states it is getting worse         HISTORY OF PRESENT ILLNESS   (Location/Symptom, Timing/Onset, Context/Setting, Quality, Duration, Modifying Factors, Severity)  Note limiting factors. HPI the patient is a 54-year-old female with who presents with vaginal bleeding. Vaginal bleeding began 3 days ago. It started with spotting but now it is gotten much worse. She is going through a pad per hour. She is passing blood clots. She does have a history of uterine fibroids. She is starting to feel lightheaded and weak. Nursing Notes were reviewed. REVIEW OF SYSTEMS    (2-9 systems for level 4, 10 or more for level 5)     Review of Systems   Constitutional: Positive for activity change and fatigue. HENT: Negative for congestion. Eyes: Negative for visual disturbance. Respiratory: Negative for cough, shortness of breath and wheezing. Cardiovascular: Negative for chest pain, palpitations and leg swelling. Gastrointestinal: Negative for abdominal distention, abdominal pain, constipation, diarrhea, nausea and vomiting. Genitourinary: Negative for difficulty urinating, dysuria, flank pain and frequency. Musculoskeletal: Negative for back pain and gait problem. Skin: Positive for pallor. Negative for rash. Neurological: Positive for light-headedness. Negative for dizziness, tremors, syncope, facial asymmetry, speech difficulty, weakness and headaches. Psychiatric/Behavioral: Negative for confusion, decreased concentration and dysphoric mood.               MEDICAL HISTORY     Past Medical History:   Diagnosis Date    Diabetes mellitus ([address]     Hypertension          SURGICAL HISTORY       Past Stress: None   Relationships    Social connections:     Talks on phone: None     Gets together: None     Attends Orthodox service: None     Active member of club or organization: None     Attends meetings of clubs or organizations: None     Relationship status: None    Intimate partner violence:     Fear of current or ex partner: None     Emotionally abused: None     Physically abused: None     Forced sexual activity: None   Other Topics Concern    None   Social History Narrative    None       SCREENINGS    [place] Coma Scale  Eye Opening: Spontaneous  Best Verbal Response: Oriented  Best Motor Response: Obeys commands  Leia Coma Scale Score: 15        PHYSICAL EXAM  (up to 7 for level 4, 8 or more for level 5)     ED Triage Vitals [02/18/20 0947]   BP Temp Temp Source Pulse Resp SpO2 Height Weight   (!) 147/81 97.4 °F (36.3 °C) Tympanic 104 16 99 % -- 214 lb (97.1 kg)       Physical Exam  Constitutional:       General: She is not in acute distress. Appearance: Normal appearance. She is obese. HENT:      Head: Normocephalic and atraumatic. Mouth/Throat:      Mouth: Mucous membranes are moist.      Pharynx: Oropharynx is clear. Eyes:      Extraocular Movements: Extraocular movements intact. Conjunctiva/sclera: Conjunctivae normal.      Pupils: Pupils are equal, round, and reactive to light. Neck:      Musculoskeletal: Normal range of motion and neck supple. Cardiovascular:      Rate and Rhythm: Regular rhythm. Tachycardia present. Pulses: Normal pulses. Heart sounds: Normal heart sounds. Pulmonary:      Effort: Pulmonary effort is normal.      Breath sounds: Normal breath sounds. Abdominal:      General: Abdomen is flat. Bowel sounds are normal. There is no distension. Palpations: Abdomen is soft. Tenderness: There is no abdominal tenderness. Musculoskeletal: Normal range of motion. General: No swelling. Skin:     General: Skin is warm and dry. Neurological:      General: No focal deficit present. Mental Status: She is alert and oriented to person, place, and time. Cranial Nerves: No cranial nerve deficit. Sensory: No sensory deficit. Motor: No weakness. Coordination: Coordination normal.   Psychiatric:         Mood and Affect: Mood normal.         Behavior: Behavior normal.         Thought Content: Thought content normal.         Judgment: Judgment normal.         DIAGNOSTIC RESULTS     EKG: All EKG's are interpreted by the Emergency Department Physician whoeither signs or Co-signs this chart in the absence of a cardiologist.      RADIOLOGY:   Non-plain film images such as CT, Ultrasound and MRI are read by the radiologist. Plain radiographic images are visualized and preliminarily interpreted by the emergency physician     Interpretation per the Radiologist below, if available at the time of this note:    [address] [place] [address]   Final Result   1. Nonvisualization of the ovaries. 2. Endometrial stripe thickness within normal limits measuring 4 mm for a   premenopausal female. 3. Heterogeneous echogenicity of the uterus likely related to underlying   fibroids. No measurable fibroid identified, however.                ED BEDSIDE ULTRASOUND:   Performed by ED Physician - none    LABS:  Labs Reviewed   CBC WITH AUTO DIFFERENTIAL - Abnormal; Notable for the following components:       Result Value    WBC 16.1 (*)     RBC 3.59 (*)     Hemoglobin 10.1 (*)     Hematocrit 32.0 (*)     Seg Neutrophils 75 (*)     Lymphocytes 18 (*)     Immature Granulocytes 1 (*)     Segs Absolute 11.92 (*)     All other components within normal limits   BASIC METABOLIC PANEL - Abnormal; Notable for the following components:    Glucose 209 (*)     CREATININE 0.47 (*)     Sodium 134 (*)     All other components within normal limits   TYPE AND SCREEN       EMERGENCY DEPARTMENT COURSE and DIFFERENTIAL DIAGNOSIS/MDM:   Vitals:    Vitals:    02/18/20 1772 BP: (!) 147/81   Pulse: 104   Resp: 16   Temp: 97.4 °F (36.3 °C)   TempSrc: Tympanic   SpO2: 99%   Weight: 214 lb (97.1 kg)           MDM patient will be put on TXA orally. I spoke with Dr. J Carlos Nguyen. CONSULTS:  None    PROCEDURES:  Unless otherwise noted below, none     Procedures    FINAL IMPRESSION      1. Vaginal bleeding          DISPOSITION/PLAN   DISPOSITION Decision To Discharge 02/18/2020 01:21:40 PM      PATIENT REFERRED TO:  Patrice Bowie MD  [facility] [address]  [phone]    Schedule an appointment as soon as possible for a visit         DISCHARGE MEDICATIONS:  New Prescriptions    TRANEXAMIC ACID (LYSTEDA) 650 MG TABS TABLET    Take 2 tablets by mouth 3 times daily              Summation      Patient Course: Uncomplicated. Patient did not require transfusion. ED Medications administered this visit:    Medications   0.9 % sodium chloride infusion (250 mL/hr Intravenous New Bag 2/18/20 1205)   tranexamic acid (CYKLOKAPRON) 1,000 mg in dextrose 5 % 100 mL IVPB (has no administration in time range)       New Prescriptions from this visit:    New Prescriptions    TRANEXAMIC ACID (LYSTEDA) 650 MG TABS TABLET    Take 2 tablets by mouth 3 times daily       Follow-up:  Patrice Bowie MD  [facility] [address]  [phone]    Schedule an appointment as soon as possible for a visit           Final Impression:   1.  Vaginal bleeding               (Please note that portions ofthis note were completed with a voice recognition program.  Efforts were made to edit the dictations but occasionally words are mis-transcribed.)      Luis Crow MD (electronically signed)  Attending Emergency Physician          Merlin Llanes MD  02/18/20 0140

## 2020-02-19 ENCOUNTER — TELEPHONE (OUTPATIENT)
Dept: OBGYN | Age: 50
End: 2020-02-19

## 2020-02-19 LAB — CYTOLOGY REPORT: NORMAL

## 2020-02-19 NOTE — TELEPHONE ENCOUNTER
Diana Alejandrotracy phoned for F/U from ER visit for heavy bleeding. I informed Diana Breezy there was no need for F/U again as Dr. Williams Carrasco spoke with the ER doctor and gave orders for her discharge. I reassured her keep taking Lysteda to stop the bleeding and again made sure she stopped the Provera. She assured me she was no longer taking the Provera. I told her to keep her appt on 3-4-20 for F/U but she did not need the US as she had one yesterday in the ER. She voiced understanding and assured me she would call the office if her current therapy was not helping.

## 2020-05-18 ENCOUNTER — HOSPITAL ENCOUNTER (OUTPATIENT)
Age: 50
Setting detail: SPECIMEN
Discharge: HOME | End: 2020-05-18
Payer: COMMERCIAL

## 2020-05-18 PROCEDURE — 86403 PARTICLE AGGLUT ANTBDY SCRN: CPT

## 2020-05-18 PROCEDURE — 87205 SMEAR GRAM STAIN: CPT

## 2020-05-18 PROCEDURE — 87070 CULTURE OTHR SPECIMN AEROBIC: CPT

## 2020-05-18 PROCEDURE — 87186 SC STD MICRODIL/AGAR DIL: CPT

## 2020-05-20 LAB
CULTURE: ABNORMAL
DIRECT EXAM: ABNORMAL
DIRECT EXAM: ABNORMAL
Lab: ABNORMAL
SPECIMEN DESCRIPTION: ABNORMAL

## 2020-05-26 ENCOUNTER — HOSPITAL ENCOUNTER (OUTPATIENT)
Dept: WOMENS IMAGING | Age: 50
Discharge: HOME | End: 2020-05-28
Payer: COMMERCIAL

## 2020-05-27 ENCOUNTER — HOSPITAL ENCOUNTER (OUTPATIENT)
Age: 50
Discharge: HOME | End: 2020-05-29
Payer: COMMERCIAL

## 2020-05-27 ENCOUNTER — HOSPITAL ENCOUNTER (OUTPATIENT)
Dept: WOMENS IMAGING | Age: 50
Discharge: HOME | End: 2020-05-29
Payer: COMMERCIAL

## 2020-05-27 PROCEDURE — 77063 BREAST TOMOSYNTHESIS BI: CPT

## 2020-06-17 ENCOUNTER — HOSPITAL ENCOUNTER (OUTPATIENT)
Age: 50
Setting detail: SPECIMEN
Discharge: HOME | End: 2020-06-17
Payer: COMMERCIAL

## 2020-06-17 ENCOUNTER — OFFICE VISIT (OUTPATIENT)
Dept: SURGERY | Age: 50
End: 2020-06-17
Payer: COMMERCIAL

## 2020-06-17 VITALS
BODY MASS INDEX: 41.02 KG/M2 | WEIGHT: 222.9 LBS | TEMPERATURE: 98.6 F | DIASTOLIC BLOOD PRESSURE: 99 MMHG | RESPIRATION RATE: 20 BRPM | HEART RATE: 97 BPM | SYSTOLIC BLOOD PRESSURE: 162 MMHG | HEIGHT: 62 IN

## 2020-06-17 PROCEDURE — 88305 TISSUE EXAM BY PATHOLOGIST: CPT

## 2020-06-17 PROCEDURE — 99204 OFFICE O/P NEW MOD 45 MIN: CPT | Performed by: SURGERY

## 2020-06-18 SDOH — SOCIAL STABILITY: SOCIAL NETWORK: ARE YOU MARRIED, WIDOWED, DIVORCED, SEPARATED, NEVER MARRIED, OR LIVING WITH A PARTNER?: MARRIED

## 2020-06-19 LAB — DERMATOLOGY PATHOLOGY REPORT: NORMAL

## 2020-06-20 NOTE — RESULT ENCOUNTER NOTE
Ok to call Case Solis and let her know pathology is benign- is basically scar tissue from infection, nothing to be concerned about. Call or return as needed.

## 2020-06-22 ENCOUNTER — TELEPHONE (OUTPATIENT)
Dept: SURGERY | Age: 50
End: 2020-06-22

## 2020-06-22 NOTE — TELEPHONE ENCOUNTER
Writer spoke to Kam and read Dr. Trent Chavarria note to her word for word. She voiced understanding.

## 2020-07-28 ENCOUNTER — HOSPITAL ENCOUNTER (OUTPATIENT)
Dept: LAB | Age: 50
Discharge: HOME | End: 2020-07-28
Payer: COMMERCIAL

## 2020-07-28 LAB
-: ABNORMAL
ALT SERPL-CCNC: 58 U/L (ref 5–33)
AMORPHOUS: ABNORMAL
AST SERPL-CCNC: 62 U/L
BACTERIA: ABNORMAL
BILIRUBIN URINE: NEGATIVE
CASTS UA: ABNORMAL /LPF
CHOLESTEROL, FASTING: 137 MG/DL
CHOLESTEROL/HDL RATIO: 2.6
COLOR: YELLOW
COMMENT UA: ABNORMAL
CREATININE URINE: 105.8 MG/DL (ref 28–217)
CRYSTALS, UA: ABNORMAL /HPF
EPITHELIAL CELLS UA: ABNORMAL /HPF (ref 0–25)
ESTIMATED AVERAGE GLUCOSE: 214 MG/DL
GLUCOSE URINE: ABNORMAL
HBA1C MFR BLD: 9.1 % (ref 4.8–5.9)
HDLC SERPL-MCNC: 52 MG/DL
KETONES, URINE: NEGATIVE
LDL CHOLESTEROL: 57 MG/DL (ref 0–130)
LEUKOCYTE ESTERASE, URINE: NEGATIVE
MICROALBUMIN/CREAT 24H UR: <12 MG/L
MICROALBUMIN/CREAT UR-RTO: NORMAL MCG/MG CREAT
MUCUS: ABNORMAL
NITRITE, URINE: NEGATIVE
OTHER OBSERVATIONS UA: ABNORMAL
PH UA: 5 (ref 5–9)
PROTEIN UA: NEGATIVE
RBC UA: ABNORMAL /HPF (ref 0–2)
RENAL EPITHELIAL, UA: ABNORMAL /HPF
SPECIFIC GRAVITY UA: >1.03 (ref 1.01–1.02)
TRICHOMONAS: ABNORMAL
TRIGLYCERIDE, FASTING: 139 MG/DL
TURBIDITY: CLEAR
URINE HGB: NEGATIVE
UROBILINOGEN, URINE: NORMAL
VLDLC SERPL CALC-MCNC: NORMAL MG/DL (ref 1–30)
WBC UA: ABNORMAL /HPF (ref 0–5)
YEAST: ABNORMAL

## 2020-07-28 PROCEDURE — 83036 HEMOGLOBIN GLYCOSYLATED A1C: CPT

## 2020-07-28 PROCEDURE — 84460 ALANINE AMINO (ALT) (SGPT): CPT

## 2020-07-28 PROCEDURE — 82043 UR ALBUMIN QUANTITATIVE: CPT

## 2020-07-28 PROCEDURE — 82570 ASSAY OF URINE CREATININE: CPT

## 2020-07-28 PROCEDURE — 84450 TRANSFERASE (AST) (SGOT): CPT

## 2020-07-28 PROCEDURE — 36415 COLL VENOUS BLD VENIPUNCTURE: CPT

## 2020-07-28 PROCEDURE — 81001 URINALYSIS AUTO W/SCOPE: CPT

## 2020-07-28 PROCEDURE — 80061 LIPID PANEL: CPT

## 2020-08-14 ENCOUNTER — HOSPITAL ENCOUNTER (OUTPATIENT)
Dept: LAB | Age: 50
Discharge: HOME | End: 2020-08-14
Payer: COMMERCIAL

## 2020-08-14 LAB
ALT SERPL-CCNC: 68 U/L (ref 5–33)
AST SERPL-CCNC: 51 U/L

## 2020-08-14 PROCEDURE — 36415 COLL VENOUS BLD VENIPUNCTURE: CPT

## 2020-08-14 PROCEDURE — 84450 TRANSFERASE (AST) (SGOT): CPT

## 2020-08-14 PROCEDURE — 84460 ALANINE AMINO (ALT) (SGPT): CPT

## 2020-09-28 ENCOUNTER — TELEPHONE (OUTPATIENT)
Dept: OBGYN | Age: 50
End: 2020-09-28

## 2020-09-28 NOTE — TELEPHONE ENCOUNTER
Pt's  called and stated that Dr. Hernando Chen increased her metformin and that week she started having increased bleeding. He was wondering if she should stop taking it. I said that she should continue to take it as her Hgb A1c was high and that it was probably a coincidence. They will keep the appt with Dr. Pamela Araujo as scheduled.

## 2020-10-12 ENCOUNTER — OFFICE VISIT (OUTPATIENT)
Dept: OBGYN | Age: 50
End: 2020-10-12
Payer: COMMERCIAL

## 2020-10-12 ENCOUNTER — HOSPITAL ENCOUNTER (OUTPATIENT)
Age: 50
Setting detail: SPECIMEN
Discharge: HOME | End: 2020-10-12
Payer: COMMERCIAL

## 2020-10-12 VITALS
DIASTOLIC BLOOD PRESSURE: 72 MMHG | WEIGHT: 215 LBS | HEART RATE: 72 BPM | RESPIRATION RATE: 16 BRPM | SYSTOLIC BLOOD PRESSURE: 126 MMHG | BODY MASS INDEX: 39.32 KG/M2

## 2020-10-12 PROCEDURE — 99213 OFFICE O/P EST LOW 20 MIN: CPT | Performed by: OBSTETRICS & GYNECOLOGY

## 2020-10-12 PROCEDURE — 88305 TISSUE EXAM BY PATHOLOGIST: CPT

## 2020-10-12 PROCEDURE — 58100 BIOPSY OF UTERUS LINING: CPT | Performed by: OBSTETRICS & GYNECOLOGY

## 2020-10-12 NOTE — PROGRESS NOTES
PROBLEM VISIT     Date of service: 10/12/2020    Cuauhtemoc London  Is a 45 y.o.  female    PT's PCP is: Shailesh Santo MD     : 1970                                             Subjective:       No LMP recorded. OB History   No obstetric history on file. Social History     Tobacco Use   Smoking Status Never Smoker   Smokeless Tobacco Never Used        Social History     Substance and Sexual Activity   Alcohol Use No       Allergies: Cat hair extract; Dust mite extract; and Penicillins      Current Outpatient Medications:     metFORMIN (GLUCOPHAGE) 500 MG tablet, Take 1 tablet by mouth 2 times daily (with meals), Disp: 60 tablet, Rfl: 5    TRUEplus Lancets 33G MISC, USE ONCE A DAY, Disp: 100 each, Rfl: 5    ACCU-CHEK DARRELL PLUS strip, USE TO TEXT BLOOD SUGAR DAILY AS NEEDED., Disp: 50 strip, Rfl: 5    lisinopril (PRINIVIL;ZESTRIL) 10 MG tablet, TAKE 1 TABLET BY MOUTH ONCE A DAY, Disp: 30 tablet, Rfl: 5    Blood Glucose Monitoring Suppl (ACCU-CHEK DARRELL PLUS) W/DEVICE KIT, 1 kit by Does not apply route daily, Disp: 1 kit, Rfl: 0    amLODIPine (NORVASC) 5 MG tablet, TAKE 1 TABLET BY MOUTH ONCE A DAY (Patient not taking: Reported on 10/12/2020), Disp: 30 tablet, Rfl: 5    Social History     Substance and Sexual Activity   Sexual Activity Yes    Partners: Male       Last Yearly:  20    Last pap: 20    Last HPV: never    Chief Complaint   Patient presents with    Other     Heavy/Irreg bleed         PE:  Vital Signs  Blood pressure 126/72, pulse 72, resp. rate 16, weight 215 lb (97.5 kg), not currently breastfeeding. Estimated body mass index is 39.32 kg/m² as calculated from the following:    Height as of 20: 5' 2\" (1.575 m). Weight as of this encounter: 215 lb (97.5 kg). NURSE: DAVID    HPI: The patient is here today with complaints of very heavy and irregular bleeding over the past few months.   This is also associated with dysmenorrhea    No PT denies fever, chills, nausea and vomiting       Objective   Pelvic Exam: GENITAL/URINARY:  External Genitalia:  General appearance; normal, Hair distribution; normal, Lesions absent  Cervix:  General appearance normal, Lesions absent, Discharge absent, Tenderness absent, Enlargement absent, Nodularity absent  Uterus:  Size normal, Contour normal, Position normal, Masses absent, Consistency; normal, Support normal, Tenderness absent  Adenexa: Masses absent, Tenderness absent, Enlargement absent, Nodularity absent                                    Vaginal discharge: Old blood noted at the cervix                       Results reviewed today:    No results found for this visit on 10/12/20. Procedure after discussion with the patient I did proceed with an endometrial biopsy. The cervix was thoroughly painted with Betadine. It was necessary to use a tenaculum cervical dilator and then was able to do the endometrial biopsy to a sound to 9.5 cm copious tissue was noted in the [place].   Patient tolerated procedure well it was necessary to use silver nitrate over the tenaculum sites    Assessment/plan: Suspect perimenopausal bleeding will also order [facility] and estradiol along with the endometrial biopsy to determine menopausal status        This is approximately a 15-minute visit

## 2020-10-15 LAB — SURGICAL PATHOLOGY REPORT: NORMAL

## 2020-10-17 ENCOUNTER — HOSPITAL ENCOUNTER (OUTPATIENT)
Age: 50
Discharge: HOME | End: 2020-10-17
Payer: COMMERCIAL

## 2020-10-17 LAB
ESTRADIOL LEVEL: 454 PG/ML (ref 27–314)
FOLLICLE STIMULATING HORMONE: 6.1 U/L (ref 1.7–21.5)

## 2020-10-17 PROCEDURE — 36415 COLL VENOUS BLD VENIPUNCTURE: CPT

## 2020-10-17 PROCEDURE — 83001 ASSAY OF GONADOTROPIN (FSH): CPT

## 2020-10-17 PROCEDURE — 82670 ASSAY OF TOTAL ESTRADIOL: CPT

## 2020-10-27 ENCOUNTER — HOSPITAL ENCOUNTER (OUTPATIENT)
Dept: LAB | Age: 50
Discharge: HOME | End: 2020-10-27
Payer: COMMERCIAL

## 2020-10-27 LAB
ABSOLUTE EOS #: 0.26 K/UL (ref 0–0.44)
ABSOLUTE IMMATURE GRANULOCYTE: 0.04 K/UL (ref 0–0.3)
ABSOLUTE LYMPH #: 2.57 K/UL (ref 1.1–3.7)
ABSOLUTE MONO #: 0.75 K/UL (ref 0.1–1.2)
ALBUMIN SERPL-MCNC: 4.1 G/DL (ref 3.5–5.2)
ALBUMIN/GLOBULIN RATIO: 1.3 (ref 1–2.5)
ALP BLD-CCNC: 76 U/L (ref 35–104)
ALT SERPL-CCNC: 40 U/L (ref 5–33)
ANION GAP SERPL CALCULATED.3IONS-SCNC: 8 MMOL/L (ref 9–17)
AST SERPL-CCNC: 30 U/L
BASOPHILS # BLD: 1 % (ref 0–2)
BASOPHILS ABSOLUTE: 0.08 K/UL (ref 0–0.2)
BILIRUB SERPL-MCNC: 0.37 MG/DL (ref 0.3–1.2)
BUN BLDV-MCNC: 14 MG/DL (ref 6–20)
BUN/CREAT BLD: 27 (ref 9–20)
CALCIUM SERPL-MCNC: 8.7 MG/DL (ref 8.6–10.4)
CHLORIDE BLD-SCNC: 102 MMOL/L (ref 98–107)
CHOLESTEROL/HDL RATIO: 3.9
CHOLESTEROL: 217 MG/DL
CO2: 24 MMOL/L (ref 20–31)
CREAT SERPL-MCNC: 0.52 MG/DL (ref 0.5–0.9)
CREATININE URINE: 89.8 MG/DL (ref 28–217)
DIFFERENTIAL TYPE: ABNORMAL
EOSINOPHILS RELATIVE PERCENT: 2 % (ref 1–4)
ESTIMATED AVERAGE GLUCOSE: 146 MG/DL
FERRITIN: 20 UG/L (ref 13–150)
GFR AFRICAN AMERICAN: >60 ML/MIN
GFR NON-AFRICAN AMERICAN: >60 ML/MIN
GFR SERPL CREATININE-BSD FRML MDRD: ABNORMAL ML/MIN/{1.73_M2}
GFR SERPL CREATININE-BSD FRML MDRD: ABNORMAL ML/MIN/{1.73_M2}
GLUCOSE BLD-MCNC: 141 MG/DL (ref 70–99)
HBA1C MFR BLD: 6.7 % (ref 4–6)
HCT VFR BLD CALC: 37.6 % (ref 36.3–47.1)
HDLC SERPL-MCNC: 55 MG/DL
HEMOGLOBIN: 11.4 G/DL (ref 11.9–15.1)
IMMATURE GRANULOCYTES: 0 %
IRON: 28 UG/DL (ref 37–145)
LDL CHOLESTEROL: 133 MG/DL (ref 0–130)
LYMPHOCYTES # BLD: 24 % (ref 24–43)
MCH RBC QN AUTO: 27.3 PG (ref 25.2–33.5)
MCHC RBC AUTO-ENTMCNC: 30.3 G/DL (ref 28.4–34.8)
MCV RBC AUTO: 90.2 FL (ref 82.6–102.9)
MICROALBUMIN/CREAT 24H UR: 146 MG/L
MICROALBUMIN/CREAT UR-RTO: 163 MCG/MG CREAT
MONOCYTES # BLD: 7 % (ref 3–12)
NRBC AUTOMATED: 0 PER 100 WBC
PDW BLD-RTO: 14 % (ref 11.8–14.4)
PLATELET # BLD: 321 K/UL (ref 138–453)
PLATELET ESTIMATE: ABNORMAL
PMV BLD AUTO: 10.1 FL (ref 8.1–13.5)
POTASSIUM SERPL-SCNC: 3.9 MMOL/L (ref 3.7–5.3)
RBC # BLD: 4.17 M/UL (ref 3.95–5.11)
RBC # BLD: ABNORMAL 10*6/UL
SEG NEUTROPHILS: 66 % (ref 36–65)
SEGMENTED NEUTROPHILS ABSOLUTE COUNT: 7.1 K/UL (ref 1.5–8.1)
SODIUM BLD-SCNC: 134 MMOL/L (ref 135–144)
TOTAL PROTEIN: 7.3 G/DL (ref 6.4–8.3)
TRIGL SERPL-MCNC: 143 MG/DL
VLDLC SERPL CALC-MCNC: ABNORMAL MG/DL (ref 1–30)
WBC # BLD: 10.8 K/UL (ref 3.5–11.3)
WBC # BLD: ABNORMAL 10*3/UL

## 2020-10-27 PROCEDURE — 82728 ASSAY OF FERRITIN: CPT

## 2020-10-27 PROCEDURE — 82570 ASSAY OF URINE CREATININE: CPT

## 2020-10-27 PROCEDURE — 80053 COMPREHEN METABOLIC PANEL: CPT

## 2020-10-27 PROCEDURE — 80061 LIPID PANEL: CPT

## 2020-10-27 PROCEDURE — 85025 COMPLETE CBC W/AUTO DIFF WBC: CPT

## 2020-10-27 PROCEDURE — 83540 ASSAY OF IRON: CPT

## 2020-10-27 PROCEDURE — 83036 HEMOGLOBIN GLYCOSYLATED A1C: CPT

## 2020-10-27 PROCEDURE — 82043 UR ALBUMIN QUANTITATIVE: CPT

## 2020-10-27 PROCEDURE — 36415 COLL VENOUS BLD VENIPUNCTURE: CPT

## 2020-11-11 RX ORDER — TRANEXAMIC ACID 650 1/1
TABLET ORAL
Qty: 30 TABLET | Refills: 5 | Status: ON HOLD
Start: 2020-11-11 | End: 2020-12-02 | Stop reason: HOSPADM

## 2020-11-18 ENCOUNTER — TELEPHONE (OUTPATIENT)
Dept: OBGYN | Age: 50
End: 2020-11-18

## 2020-11-27 ENCOUNTER — OFFICE VISIT (OUTPATIENT)
Dept: OBGYN | Age: 50
End: 2020-11-27
Payer: COMMERCIAL

## 2020-11-27 ENCOUNTER — HOSPITAL ENCOUNTER (OUTPATIENT)
Dept: PREADMISSION TESTING | Age: 50
Setting detail: SPECIMEN
Discharge: HOME | End: 2020-12-01
Payer: COMMERCIAL

## 2020-11-27 VITALS
HEIGHT: 62 IN | DIASTOLIC BLOOD PRESSURE: 72 MMHG | WEIGHT: 216 LBS | SYSTOLIC BLOOD PRESSURE: 126 MMHG | BODY MASS INDEX: 39.75 KG/M2

## 2020-11-27 DIAGNOSIS — Z01.818 PREOPERATIVE TESTING: Primary | ICD-10-CM

## 2020-11-27 PROCEDURE — C9803 HOPD COVID-19 SPEC COLLECT: HCPCS

## 2020-11-27 PROCEDURE — 99212 OFFICE O/P EST SF 10 MIN: CPT | Performed by: OBSTETRICS & GYNECOLOGY

## 2020-11-27 PROCEDURE — U0003 INFECTIOUS AGENT DETECTION BY NUCLEIC ACID (DNA OR RNA); SEVERE ACUTE RESPIRATORY SYNDROME CORONAVIRUS 2 (SARS-COV-2) (CORONAVIRUS DISEASE [COVID-19]), AMPLIFIED PROBE TECHNIQUE, MAKING USE OF HIGH THROUGHPUT TECHNOLOGIES AS DESCRIBED BY CMS-2020-01-R: HCPCS

## 2020-11-27 NOTE — PROGRESS NOTES
PROBLEM VISIT     Date of service: 2020    Shelby Morin  Is a 45 y.o.  female    PT's PCP is: Chelsey Mckeon MD     : 1970                                             Subjective:       No LMP recorded (lmp unknown). (Menstrual status: Irregular periods). OB History    Para Term  AB Living   4 4           SAB TAB Ectopic Molar Multiple Live Births                    # Outcome Date GA Lbr Yeyo/2nd Weight Sex Delivery Anes PTL Lv   4 Para            3 Para            2 Para            1 Para                 Social History     Tobacco Use   Smoking Status Never Smoker   Smokeless Tobacco Never Used        Social History     Substance and Sexual Activity   Alcohol Use No       Social History     Substance and Sexual Activity   Sexual Activity Yes    Partners: Male       Allergies: Cat hair extract; Dust mite extract; and Penicillins    Chief Complaint   Patient presents with    Vaginal Bleeding     pt presents to discuss ablation       Last Yearly:  20    Last pap: 20    Last HPV:       NURSE: dory    PE:  Vital Signs  Blood pressure 126/72, height 5' 2\" (1.575 m), weight 216 lb (98 kg), not currently breastfeeding. Labs:    No results found for this visit on 20. NURSE: calista    HPI: The patient is here today to discuss ablation. Thorough work-up revealed that she is not yet menopausal and no evidence of any endometrial pathology noted. She does take the [facility] but she is dissatisfied with the reduction in bleeding with this medication. She is also had very heavy menses for the past 3 months    No  PT denies fever, chills, nausea and vomiting       Objective: I did review Pap smear ultrasound endometrial biopsy results with patient                           Assessment and Plan: After discussion will proceed with hysteroscopy dilatation curettage and NovaSure ablation.   I did review surgical risk of blood loss, infection, small risk of uterine perforation with risk of additional surgery and failure of the NovaSure procedure. Patient does acknowledge these surgical risk and does sign consent will attempt to get this performed by the end of the year          Diagnosis Orders   1. DUB (dysfunctional uterine bleeding)               No follow-ups on file. FF: 10 minutes    There are no Patient Instructions on file for this visit. Over 75%of time spent on counseling and care coordination on: see assessment and plan,  She was also counseled on her preventative health maintenance recommendations and follow-up.       Desi Martinez,11/27/2020 10:08 AM

## 2020-11-29 LAB — SARS-COV-2, NAA: NOT DETECTED

## 2020-11-30 ENCOUNTER — HOSPITAL ENCOUNTER (OUTPATIENT)
Dept: PREADMISSION TESTING | Age: 50
Setting detail: OUTPATIENT SURGERY
Discharge: HOME | End: 2020-12-04
Attending: OBSTETRICS & GYNECOLOGY
Payer: COMMERCIAL

## 2020-11-30 VITALS
OXYGEN SATURATION: 98 % | HEART RATE: 79 BPM | WEIGHT: 214 LBS | BODY MASS INDEX: 39.38 KG/M2 | TEMPERATURE: 97.4 F | DIASTOLIC BLOOD PRESSURE: 84 MMHG | SYSTOLIC BLOOD PRESSURE: 146 MMHG | RESPIRATION RATE: 20 BRPM | HEIGHT: 62 IN

## 2020-11-30 LAB
ABO/RH: NORMAL
ABSOLUTE EOS #: 0.27 K/UL (ref 0–0.44)
ABSOLUTE IMMATURE GRANULOCYTE: 0.05 K/UL (ref 0–0.3)
ABSOLUTE LYMPH #: 2.81 K/UL (ref 1.1–3.7)
ABSOLUTE MONO #: 0.97 K/UL (ref 0.1–1.2)
ANTIBODY SCREEN: NEGATIVE
ARM BAND NUMBER: NORMAL
BASOPHILS # BLD: 1 % (ref 0–2)
BASOPHILS ABSOLUTE: 0.1 K/UL (ref 0–0.2)
DIFFERENTIAL TYPE: ABNORMAL
EKG ATRIAL RATE: 75 BPM
EKG P AXIS: 53 DEGREES
EKG P-R INTERVAL: 160 MS
EKG Q-T INTERVAL: 418 MS
EKG QRS DURATION: 88 MS
EKG QTC CALCULATION (BAZETT): 466 MS
EKG R AXIS: -8 DEGREES
EKG T AXIS: 31 DEGREES
EKG VENTRICULAR RATE: 75 BPM
EOSINOPHILS RELATIVE PERCENT: 2 % (ref 1–4)
EXPIRATION DATE: NORMAL
HCG QUALITATIVE: NEGATIVE
HCT VFR BLD CALC: 38.9 % (ref 36.3–47.1)
HEMOGLOBIN: 12 G/DL (ref 11.9–15.1)
IMMATURE GRANULOCYTES: 0 %
LYMPHOCYTES # BLD: 21 % (ref 24–43)
MCH RBC QN AUTO: 27.3 PG (ref 25.2–33.5)
MCHC RBC AUTO-ENTMCNC: 30.8 G/DL (ref 28.4–34.8)
MCV RBC AUTO: 88.6 FL (ref 82.6–102.9)
MONOCYTES # BLD: 7 % (ref 3–12)
NRBC AUTOMATED: 0 PER 100 WBC
PDW BLD-RTO: 13.5 % (ref 11.8–14.4)
PLATELET # BLD: 346 K/UL (ref 138–453)
PLATELET ESTIMATE: ABNORMAL
PMV BLD AUTO: 9.4 FL (ref 8.1–13.5)
RBC # BLD: 4.39 M/UL (ref 3.95–5.11)
RBC # BLD: ABNORMAL 10*6/UL
SEG NEUTROPHILS: 69 % (ref 36–65)
SEGMENTED NEUTROPHILS ABSOLUTE COUNT: 9.28 K/UL (ref 1.5–8.1)
WBC # BLD: 13.5 K/UL (ref 3.5–11.3)
WBC # BLD: ABNORMAL 10*3/UL

## 2020-11-30 PROCEDURE — 87086 URINE CULTURE/COLONY COUNT: CPT

## 2020-11-30 PROCEDURE — 87088 URINE BACTERIA CULTURE: CPT

## 2020-11-30 PROCEDURE — 86900 BLOOD TYPING SEROLOGIC ABO: CPT

## 2020-11-30 PROCEDURE — 87186 SC STD MICRODIL/AGAR DIL: CPT

## 2020-11-30 PROCEDURE — 86901 BLOOD TYPING SEROLOGIC RH(D): CPT

## 2020-11-30 PROCEDURE — 93010 ELECTROCARDIOGRAM REPORT: CPT | Performed by: INTERNAL MEDICINE

## 2020-11-30 PROCEDURE — 36415 COLL VENOUS BLD VENIPUNCTURE: CPT

## 2020-11-30 PROCEDURE — 86850 RBC ANTIBODY SCREEN: CPT

## 2020-11-30 PROCEDURE — 85025 COMPLETE CBC W/AUTO DIFF WBC: CPT

## 2020-11-30 PROCEDURE — 93005 ELECTROCARDIOGRAM TRACING: CPT | Performed by: OBSTETRICS & GYNECOLOGY

## 2020-11-30 PROCEDURE — 84703 CHORIONIC GONADOTROPIN ASSAY: CPT

## 2020-11-30 RX ORDER — SODIUM CHLORIDE, SODIUM LACTATE, POTASSIUM CHLORIDE, CALCIUM CHLORIDE 600; 310; 30; 20 MG/100ML; MG/100ML; MG/100ML; MG/100ML
INJECTION, SOLUTION INTRAVENOUS CONTINUOUS
Status: CANCELLED | OUTPATIENT
Start: 2020-11-30

## 2020-11-30 RX ORDER — CLINDAMYCIN PHOSPHATE 900 MG/50ML
900 INJECTION INTRAVENOUS
Status: CANCELLED | OUTPATIENT
Start: 2020-11-30 | End: 2020-11-30

## 2020-11-30 NOTE — PROGRESS NOTES
[facility]   Preadmission Testing    Name: Winifred Clarke  : 1970  Patient Phone: [phone] (home) [phone] (work)    Procedure HYSTEROSCOPY, D+C  Date of Procedure: 20  Surgeon: Robina Jimenez MD    Ht:  5' 2\" (157.5 cm)  Wt: 214 lb (97.1 kg)  Wt method: Actual    Allergies: Allergies   Allergen Reactions    Cat Hair Extract     Dust Mite Extract     Penicillins        Peanut allergy: No    Latex Allergy Screening Tool  Have you ever had a reaction to or been told by a physician that you have an allergy to latex or natural rubber?: No    Vitals:    20 1106   BP: (!) 146/84   Pulse: 79   Resp: 20   Temp: 97.4 °F (36.3 °C)   SpO2: 98%       No LMP recorded (lmp unknown). (Menstrual status: Irregular periods). Do you take blood thinners? [] Yes    [x] No         Instructed to stop blood thinners prior to procedure? [x] Yes    [] No      [] N/A   Do you have sleep apnea? [] Yes    [x] No     Instructed to bring CPAP machine? [] Yes    [] No    [x] N/A   Do you have acid reflux ? [] Yes    [x] No     Do you have  hiatal hernia? [] Yes    [x] No    Do you ever experience motion sickness? [] Yes    [x] No     Have you had a respiratory infection or sore throat in last 4 weeks before surgery? [] Yes    [x] No     Do you have poorly controlled asthma or COPD? [] Yes    [x] No     Do you have a history of angina in the last month or symptomatic arrhythmia? [] Yes    [x] No     Do you have significant central nervous system disease? [] Yes    [x] No     Have you had an EKG, labs, or chest xray in last 12 months? If yes provide copies to anesthesia   [x] Yes    [] No       [x] Lab    [] EKG    [] CXR     Have you had a stress test?     [] Yes    [x] No    When/where:    Was it normal?    [] Yes    [] No   Do you or your family have a history of Malignant Hyperthermia?    [] Yes    [x] No               PAT Call/Visit Questions  Person Interviewed: PATIENT  Surgery Time Verified: Yes  Patient Language: ENGLISH  Medical History Reviewed: Yes  NPO Status Reinforced: Yes  Ride and Caregiver Arranged: Yes  Ride Caregiver Provider: -HARESH    Pre-AdmissionTesting Checklist  Patient has been to this health system before?: Yes  Does patient refuse blood?: No  Healthcare Directive: No, patient does not have an advance directive for healthcare treatment   needed: No  Patient can read and write?: Yes  Meds-to-Beds: Does the patient want to have any new prescriptions delivered to bedside prior to discharge?: No  History given by: Patient  Providing self care at home?: Yes  Discharge transport (for same day patients): Family    Patient instructed on the pre-operative, intra-operative, and post-operative process? Yes  Medication instructions reviewed with patient? Yes  Pre operative instruction sheet reviewed and given to patient in PAT?   Yes

## 2020-12-01 ENCOUNTER — ANESTHESIA EVENT (OUTPATIENT)
Dept: OPERATING ROOM | Age: 50
End: 2020-12-01
Payer: COMMERCIAL

## 2020-12-02 ENCOUNTER — ANESTHESIA (OUTPATIENT)
Dept: OPERATING ROOM | Age: 50
End: 2020-12-02
Payer: COMMERCIAL

## 2020-12-02 ENCOUNTER — HOSPITAL ENCOUNTER (OUTPATIENT)
Age: 50
Setting detail: OUTPATIENT SURGERY
Discharge: HOME | End: 2020-12-02
Attending: OBSTETRICS & GYNECOLOGY | Admitting: OBSTETRICS & GYNECOLOGY
Payer: COMMERCIAL

## 2020-12-02 VITALS — OXYGEN SATURATION: 95 % | SYSTOLIC BLOOD PRESSURE: 117 MMHG | DIASTOLIC BLOOD PRESSURE: 61 MMHG

## 2020-12-02 VITALS
DIASTOLIC BLOOD PRESSURE: 67 MMHG | WEIGHT: 214 LBS | SYSTOLIC BLOOD PRESSURE: 114 MMHG | TEMPERATURE: 98.2 F | OXYGEN SATURATION: 96 % | BODY MASS INDEX: 39.38 KG/M2 | HEART RATE: 74 BPM | RESPIRATION RATE: 18 BRPM | HEIGHT: 62 IN

## 2020-12-02 LAB
CULTURE: ABNORMAL
GLUCOSE BLD-MCNC: 149 MG/DL (ref 74–100)
Lab: ABNORMAL
SPECIMEN DESCRIPTION: ABNORMAL

## 2020-12-02 PROCEDURE — 2709999900 HC NON-CHARGEABLE SUPPLY: Performed by: OBSTETRICS & GYNECOLOGY

## 2020-12-02 PROCEDURE — 3600000013 HC SURGERY LEVEL 3 ADDTL 15MIN: Performed by: OBSTETRICS & GYNECOLOGY

## 2020-12-02 PROCEDURE — 82947 ASSAY GLUCOSE BLOOD QUANT: CPT

## 2020-12-02 PROCEDURE — 2580000003 HC RX 258: Performed by: OBSTETRICS & GYNECOLOGY

## 2020-12-02 PROCEDURE — 2500000003 HC RX 250 WO HCPCS: Performed by: NURSE ANESTHETIST, CERTIFIED REGISTERED

## 2020-12-02 PROCEDURE — 6360000002 HC RX W HCPCS: Performed by: OBSTETRICS & GYNECOLOGY

## 2020-12-02 PROCEDURE — 7100000010 HC PHASE II RECOVERY - FIRST 15 MIN: Performed by: OBSTETRICS & GYNECOLOGY

## 2020-12-02 PROCEDURE — 88305 TISSUE EXAM BY PATHOLOGIST: CPT

## 2020-12-02 PROCEDURE — 3700000001 HC ADD 15 MINUTES (ANESTHESIA): Performed by: OBSTETRICS & GYNECOLOGY

## 2020-12-02 PROCEDURE — 3600000003 HC SURGERY LEVEL 3 BASE: Performed by: OBSTETRICS & GYNECOLOGY

## 2020-12-02 PROCEDURE — 6370000000 HC RX 637 (ALT 250 FOR IP): Performed by: OBSTETRICS & GYNECOLOGY

## 2020-12-02 PROCEDURE — 3700000000 HC ANESTHESIA ATTENDED CARE: Performed by: OBSTETRICS & GYNECOLOGY

## 2020-12-02 PROCEDURE — 2500000003 HC RX 250 WO HCPCS: Performed by: OBSTETRICS & GYNECOLOGY

## 2020-12-02 PROCEDURE — 2720000010 HC SURG SUPPLY STERILE: Performed by: OBSTETRICS & GYNECOLOGY

## 2020-12-02 PROCEDURE — 6360000002 HC RX W HCPCS: Performed by: NURSE ANESTHETIST, CERTIFIED REGISTERED

## 2020-12-02 PROCEDURE — 58563 HYSTEROSCOPY ABLATION: CPT | Performed by: OBSTETRICS & GYNECOLOGY

## 2020-12-02 PROCEDURE — 7100000011 HC PHASE II RECOVERY - ADDTL 15 MIN: Performed by: OBSTETRICS & GYNECOLOGY

## 2020-12-02 RX ORDER — DIMENHYDRINATE 50 MG/1
50 TABLET ORAL ONCE
Status: COMPLETED | OUTPATIENT
Start: 2020-12-02 | End: 2020-12-02

## 2020-12-02 RX ORDER — ACETAMINOPHEN 325 MG/1
650 TABLET ORAL EVERY 4 HOURS PRN
Status: CANCELLED | OUTPATIENT
Start: 2020-12-02

## 2020-12-02 RX ORDER — SODIUM CHLORIDE 0.9 % (FLUSH) 0.9 %
10 SYRINGE (ML) INJECTION EVERY 12 HOURS SCHEDULED
Status: CANCELLED | OUTPATIENT
Start: 2020-12-02

## 2020-12-02 RX ORDER — ONDANSETRON 2 MG/ML
INJECTION INTRAMUSCULAR; INTRAVENOUS PRN
Status: DISCONTINUED | OUTPATIENT
Start: 2020-12-02 | End: 2020-12-02 | Stop reason: SDUPTHER

## 2020-12-02 RX ORDER — ONDANSETRON 2 MG/ML
4 INJECTION INTRAMUSCULAR; INTRAVENOUS EVERY 6 HOURS PRN
Status: CANCELLED | OUTPATIENT
Start: 2020-12-02

## 2020-12-02 RX ORDER — ACETAMINOPHEN 325 MG/1
650 TABLET ORAL ONCE
Status: COMPLETED | OUTPATIENT
Start: 2020-12-02 | End: 2020-12-02

## 2020-12-02 RX ORDER — SODIUM CHLORIDE, SODIUM LACTATE, POTASSIUM CHLORIDE, CALCIUM CHLORIDE 600; 310; 30; 20 MG/100ML; MG/100ML; MG/100ML; MG/100ML
INJECTION, SOLUTION INTRAVENOUS CONTINUOUS
Status: DISCONTINUED | OUTPATIENT
Start: 2020-12-02 | End: 2020-12-02 | Stop reason: HOSPADM

## 2020-12-02 RX ORDER — DEXAMETHASONE SODIUM PHOSPHATE 4 MG/ML
INJECTION, SOLUTION INTRA-ARTICULAR; INTRALESIONAL; INTRAMUSCULAR; INTRAVENOUS; SOFT TISSUE PRN
Status: DISCONTINUED | OUTPATIENT
Start: 2020-12-02 | End: 2020-12-02 | Stop reason: SDUPTHER

## 2020-12-02 RX ORDER — PROPOFOL 10 MG/ML
INJECTION, EMULSION INTRAVENOUS PRN
Status: DISCONTINUED | OUTPATIENT
Start: 2020-12-02 | End: 2020-12-02 | Stop reason: SDUPTHER

## 2020-12-02 RX ORDER — SODIUM CHLORIDE, SODIUM LACTATE, POTASSIUM CHLORIDE, CALCIUM CHLORIDE 600; 310; 30; 20 MG/100ML; MG/100ML; MG/100ML; MG/100ML
INJECTION, SOLUTION INTRAVENOUS CONTINUOUS
Status: CANCELLED | OUTPATIENT
Start: 2020-12-02

## 2020-12-02 RX ORDER — METOCLOPRAMIDE HYDROCHLORIDE 5 MG/ML
5 INJECTION INTRAMUSCULAR; INTRAVENOUS ONCE
Status: COMPLETED | OUTPATIENT
Start: 2020-12-02 | End: 2020-12-02

## 2020-12-02 RX ORDER — PROPOFOL 10 MG/ML
INJECTION, EMULSION INTRAVENOUS CONTINUOUS PRN
Status: DISCONTINUED | OUTPATIENT
Start: 2020-12-02 | End: 2020-12-02 | Stop reason: SDUPTHER

## 2020-12-02 RX ORDER — CLINDAMYCIN PHOSPHATE 900 MG/50ML
900 INJECTION INTRAVENOUS
Status: COMPLETED | OUTPATIENT
Start: 2020-12-02 | End: 2020-12-02

## 2020-12-02 RX ORDER — FENTANYL CITRATE 50 UG/ML
INJECTION, SOLUTION INTRAMUSCULAR; INTRAVENOUS PRN
Status: DISCONTINUED | OUTPATIENT
Start: 2020-12-02 | End: 2020-12-02 | Stop reason: SDUPTHER

## 2020-12-02 RX ORDER — SCOLOPAMINE TRANSDERMAL SYSTEM 1 MG/1
1 PATCH, EXTENDED RELEASE TRANSDERMAL ONCE
Status: DISCONTINUED | OUTPATIENT
Start: 2020-12-02 | End: 2020-12-02 | Stop reason: HOSPADM

## 2020-12-02 RX ORDER — HYDROCODONE BITARTRATE AND ACETAMINOPHEN 5; 325 MG/1; MG/1
1 TABLET ORAL EVERY 4 HOURS PRN
Status: CANCELLED | OUTPATIENT
Start: 2020-12-02

## 2020-12-02 RX ORDER — LIDOCAINE HYDROCHLORIDE 20 MG/ML
INJECTION, SOLUTION EPIDURAL; INFILTRATION; INTRACAUDAL; PERINEURAL PRN
Status: DISCONTINUED | OUTPATIENT
Start: 2020-12-02 | End: 2020-12-02 | Stop reason: SDUPTHER

## 2020-12-02 RX ORDER — HYDROCODONE BITARTRATE AND ACETAMINOPHEN 5; 325 MG/1; MG/1
1 TABLET ORAL EVERY 6 HOURS PRN
Qty: 12 TABLET | Refills: 0 | Status: SHIPPED | OUTPATIENT
Start: 2020-12-02 | End: 2020-12-05

## 2020-12-02 RX ORDER — MIDAZOLAM HYDROCHLORIDE 1 MG/ML
INJECTION INTRAMUSCULAR; INTRAVENOUS PRN
Status: DISCONTINUED | OUTPATIENT
Start: 2020-12-02 | End: 2020-12-02 | Stop reason: SDUPTHER

## 2020-12-02 RX ORDER — SODIUM CHLORIDE 0.9 % (FLUSH) 0.9 %
10 SYRINGE (ML) INJECTION PRN
Status: CANCELLED | OUTPATIENT
Start: 2020-12-02

## 2020-12-02 RX ORDER — PROMETHAZINE HYDROCHLORIDE 25 MG/1
12.5 TABLET ORAL EVERY 6 HOURS PRN
Status: CANCELLED | OUTPATIENT
Start: 2020-12-02

## 2020-12-02 RX ORDER — KETOROLAC TROMETHAMINE 30 MG/ML
INJECTION, SOLUTION INTRAMUSCULAR; INTRAVENOUS PRN
Status: DISCONTINUED | OUTPATIENT
Start: 2020-12-02 | End: 2020-12-02 | Stop reason: SDUPTHER

## 2020-12-02 RX ORDER — HYDROCODONE BITARTRATE AND ACETAMINOPHEN 5; 325 MG/1; MG/1
2 TABLET ORAL EVERY 4 HOURS PRN
Status: CANCELLED | OUTPATIENT
Start: 2020-12-02

## 2020-12-02 RX ADMIN — GENTAMICIN SULFATE 480 MG: 40 INJECTION, SOLUTION INTRAMUSCULAR; INTRAVENOUS at 09:01

## 2020-12-02 RX ADMIN — DEXAMETHASONE SODIUM PHOSPHATE 4 MG: 4 INJECTION, SOLUTION INTRAMUSCULAR; INTRAVENOUS at 09:51

## 2020-12-02 RX ADMIN — FAMOTIDINE 20 MG: 10 INJECTION, SOLUTION INTRAVENOUS at 08:51

## 2020-12-02 RX ADMIN — LIDOCAINE HYDROCHLORIDE 100 MG: 20 INJECTION, SOLUTION EPIDURAL; INFILTRATION; INTRACAUDAL; PERINEURAL at 09:40

## 2020-12-02 RX ADMIN — PROPOFOL 50 MG: 10 INJECTION, EMULSION INTRAVENOUS at 09:40

## 2020-12-02 RX ADMIN — DIMENHYDRINATE 50 MG: 50 TABLET ORAL at 08:43

## 2020-12-02 RX ADMIN — ONDANSETRON 4 MG: 2 INJECTION INTRAMUSCULAR; INTRAVENOUS at 09:51

## 2020-12-02 RX ADMIN — CLINDAMYCIN PHOSPHATE 900 MG: 900 INJECTION, SOLUTION INTRAVENOUS at 09:48

## 2020-12-02 RX ADMIN — METOCLOPRAMIDE 5 MG: 5 INJECTION, SOLUTION INTRAMUSCULAR; INTRAVENOUS at 08:53

## 2020-12-02 RX ADMIN — KETOROLAC TROMETHAMINE 30 MG: 30 INJECTION, SOLUTION INTRAMUSCULAR; INTRAVENOUS at 09:53

## 2020-12-02 RX ADMIN — ACETAMINOPHEN 650 MG: 325 TABLET, FILM COATED ORAL at 08:43

## 2020-12-02 RX ADMIN — FENTANYL CITRATE 50 MCG: 50 INJECTION, SOLUTION INTRAMUSCULAR; INTRAVENOUS at 09:40

## 2020-12-02 RX ADMIN — PROPOFOL 200 MCG/KG/MIN: 10 INJECTION, EMULSION INTRAVENOUS at 09:40

## 2020-12-02 RX ADMIN — MIDAZOLAM 2 MG: 1 INJECTION INTRAMUSCULAR; INTRAVENOUS at 09:37

## 2020-12-02 RX ADMIN — SODIUM CHLORIDE, POTASSIUM CHLORIDE, SODIUM LACTATE AND CALCIUM CHLORIDE: 600; 310; 30; 20 INJECTION, SOLUTION INTRAVENOUS at 08:52

## 2020-12-02 ASSESSMENT — PAIN SCALES - GENERAL
PAINLEVEL_OUTOF10: 0

## 2020-12-02 ASSESSMENT — PAIN - FUNCTIONAL ASSESSMENT: PAIN_FUNCTIONAL_ASSESSMENT: 0-10

## 2020-12-02 NOTE — ANESTHESIA PRE PROCEDURE
Lab Results   Component Value Date    WBC 13.5 11/30/2020    RBC 4.39 11/30/2020    HGB 12.0 11/30/2020    HCT 38.9 11/30/2020    MCV 88.6 11/30/2020    RDW 13.5 11/30/2020     11/30/2020       CMP:   Lab Results   Component Value Date     10/27/2020    K 3.9 10/27/2020     10/27/2020    CO2 24 10/27/2020    BUN 14 10/27/2020    CREATININE 0.52 10/27/2020    GFRAA >60 10/27/2020    LABGLOM >60 10/27/2020    GLUCOSE 141 10/27/2020    PROT 7.3 10/27/2020    CALCIUM 8.7 10/27/2020    BILITOT 0.37 10/27/2020    ALKPHOS 76 10/27/2020    AST 30 10/27/2020    ALT 40 10/27/2020       POC Tests:   Recent Labs     12/02/20  0852   POCGLU 149*       Coags:   Lab Results   Component Value Date    PROTIME 9.8 02/16/2020    INR 1.0 02/16/2020    APTT 26.1 02/16/2020       HCG (If Applicable): No results found for: PREGTESTUR, PREGSERUM, HCG, HCGQUANT     ABGs: No results found for: PHART, PO2ART, QGC4GNS, VUE6HRX, BEART, Q0VGJMLP     Type & Screen (If Applicable):  No results found for: LABABO, LABRH    Drug/Infectious Status (If Applicable):  No results found for: HIV, HEPCAB    COVID-19 Screening (If Applicable):   Lab Results   Component Value Date    COVID19 Not Detected 11/27/2020         Anesthesia Evaluation  Patient summary reviewed and Nursing notes reviewed   history of anesthetic complications: PONV. Airway: Mallampati: I  TM distance: >3 FB   Neck ROM: full  Mouth opening: > = 3 FB Dental: normal exam         Pulmonary:Negative Pulmonary ROS and normal exam  breath sounds clear to auscultation                             Cardiovascular:    (+) hypertension:, hyperlipidemia      ECG reviewed  Rhythm: regular  Rate: normal                    Neuro/Psych:   Negative Neuro/Psych ROS              GI/Hepatic/Renal: Neg GI/Hepatic/Renal ROS            Endo/Other:    (+) DiabetesType II DM, well controlled, , .          Pt had PAT visit.         ROS comment: A1C 6.7 Abdominal:           Vascular: Anesthesia Plan      general     ASA 2       Induction: intravenous. Anesthetic plan and risks discussed with patient.                       BISHNU More - OWEN   12/2/2020

## 2020-12-02 NOTE — ANESTHESIA POSTPROCEDURE EVALUATION
Department of Anesthesiology  Postprocedure Note    Patient: Owen Collado  MRN: [id number]  YOB: 1970  Date of evaluation: 12/2/2020  Time:  11:17 AM     Procedure Summary     Date:  12/02/20 Room / Location:  [address]    Anesthesia Start:  7858 Anesthesia Stop:  1010    Procedure:  DILATATION AND CURETTAGE HYSTEROSCOPY CAUTERY ABLATION, NOVASURE (N/A ) Diagnosis:  (DUB)    Surgeon:  Georgia Corcoran MD Responsible Provider:  BISHNU Corrigan CRNA    Anesthesia Type:  general ASA Status:  2          Anesthesia Type: general    Jose D Phase I: Jose D Score: 10    Jose D Phase II: Jose D Score: 9    Last vitals: Reviewed and per EMR flowsheets.        Anesthesia Post Evaluation    Patient location during evaluation: PACU  Patient participation: complete - patient participated  Level of consciousness: awake and alert  Pain score: 0  Airway patency: patent  Nausea & Vomiting: no nausea and no vomiting  Complications: no  Cardiovascular status: blood pressure returned to baseline  Respiratory status: acceptable and room air  Hydration status: stable

## 2020-12-02 NOTE — OP NOTE
[address] [facility] [place] .                                OPERATIVE REPORT    PATIENT NAME: Elisha Parra                      :        1970  MED REC NO:   [id number]                              ROOM:  ACCOUNT NO:   [de-identified]                           ADMIT DATE: 2020  PROVIDER:     Epifanio Cleveland MD    DATE OF PROCEDURE:  2020    PREOPERATIVE DIAGNOSIS:  Severe degree of dysfunctional uterine bleeding  with menorrhagia. POSTOPERATIVE DIAGNOSIS:  Severe degree of dysfunctional uterine  bleeding with menorrhagia. PROCEDURES PERFORMED:  Hysteroscopy, dilatation and curettage, and  NovaSure ablation. SURGEON:  Epifanio Cleveland M.D. ANESTHESIA:  General.    ESTIMATED BLOOD LOSS:  Minimal from the procedure. COMPLICATIONS:  None. FINDINGS:  A slightly enlarged anteverted uterus with a patulous cervix  and a very thickened amount of endometrial tissue. Of note, endometrial  biopsy taken preoperatively was entirely benign. DESCRIPTION OF PROCEDURE:  The patient is taken to the operating room. General anesthesia is administered and the patient did undergo perineal  prepping, vaginal prepping, drainage of the bladder and appropriate  draping. With the aid of retractors, the cervix was well visualized and  grasped on the anterior lip with sharp tooth tenaculum. The uterine  sound was 10.5 cm. The cervix was patulous and did not need dilatation  to achieve 14-[place]ong[place] size. This allowed immediate placement of the  hysteroscope. Blood was irrigated from the endometrial cavity and there  was noted to be diffusely thickened endometrium noted. There were no  polyps or fibroids noted in the endometrial cavity. The hysteroscope  was withdrawn and then aggressive curettage was performed yielding a  very large amount of endometrial tissue. This was sampled and sent to  Pathology.   Then, the cervix was measured to 3 cm. So the cavity length  of the ablation array was set for 6.5 cm and the cavity width was 4.5  cm. This gave a setting of a power of 161. The cavity assessment did  pass and the ablation procedure did take 69 seconds to perform. Then,  the ablation array was removed clearly having been fired. Of note,  petroleum-coated gauze was placed around the collar of the NovaSure  device to help prevent any leaking. So after the array was removed and  the tenaculum was removed, there was light bleeding from each tenaculum  site, so a figure-of-eight suture was placed with 3-0 chromic over these  sites and gave excellent hemostasis. All sponge, needle, and instrument  counts are correct. The patient will be sent home with Libia Horan to  take as needed for any postoperative cramping. Also, the patient was  noted to be colonized with E. coli, but she did receive Cleocin and  gentamicin for surgical prophylaxis and this should be adequate to  resolve this E. coli colonization.         Harmeet Rosas MD    D: 12/02/2020 10:24:09       T: 12/02/2020 10:32:04     [email or web address]  Job#: [id number]     Doc#: [id number]    CC:

## 2020-12-04 LAB — SURGICAL PATHOLOGY REPORT: NORMAL

## 2020-12-30 ENCOUNTER — OFFICE VISIT (OUTPATIENT)
Dept: OBGYN | Age: 50
End: 2020-12-30
Payer: COMMERCIAL

## 2020-12-30 VITALS
BODY MASS INDEX: 40.3 KG/M2 | WEIGHT: 219 LBS | DIASTOLIC BLOOD PRESSURE: 74 MMHG | SYSTOLIC BLOOD PRESSURE: 124 MMHG | HEIGHT: 62 IN

## 2020-12-30 PROCEDURE — 99212 OFFICE O/P EST SF 10 MIN: CPT | Performed by: OBSTETRICS & GYNECOLOGY

## 2020-12-30 NOTE — PROGRESS NOTES
PROBLEM VISIT     Date of service: 2020    Trung Crowell  Is a 45 y.o.  female    PT's PCP is: Lexa Kaplan MD     : 1970                                             Subjective:       No LMP recorded. Patient has had an ablation. OB History    Para Term  AB Living   4 4           SAB TAB Ectopic Molar Multiple Live Births                    # Outcome Date GA Lbr Yeyo/2nd Weight Sex Delivery Anes PTL Lv   4 Para            3 Para            2 Para            1 Para                 Social History     Tobacco Use   Smoking Status Never Smoker   Smokeless Tobacco Never Used        Social History     Substance and Sexual Activity   Alcohol Use No       Social History     Substance and Sexual Activity   Sexual Activity Yes    Partners: Male       Allergies: Cat hair extract, Dust mite extract, and Penicillins    Chief Complaint   Patient presents with    Post-Op Check     pt presents for post op visit following Ablation on        Last Yearly:  20    Last pap: 20    Last HPV: never      NURSE: dory         PE:  Vital Signs  Blood pressure 124/74, height 5' 2\" (1.575 m), weight 219 lb (99.3 kg), not currently breastfeeding. Labs:    No results found for this visit on 20. NURSE: calista    HPI: The patient is here today for a postop visit following hysteroscopy D&C and ablation. The patient is doing well and is only having minimal pinkish discharge at this time no postoperative pain    No  PT denies fever, chills, nausea and vomiting       Objective: I did review pathology report of polypoid changes as well as areas of mild simple hyperplasia. Assessment and Plan: Patient is doing well postoperatively          Diagnosis Orders   1. Postop check               No follow-ups on file. FF: 10 minutes    There are no Patient Instructions on file for this visit. Over 75%of time spent on counseling and care coordination on: see assessment and plan,  She was also counseled on her preventative health maintenance recommendations and follow-up.       Terese Martinez,12/30/2020 4:11 PM

## 2021-03-26 ENCOUNTER — HOSPITAL ENCOUNTER (OUTPATIENT)
Age: 51
Setting detail: SPECIMEN
Discharge: HOME | End: 2021-03-26
Payer: COMMERCIAL

## 2021-03-26 DIAGNOSIS — R31.0 GROSS HEMATURIA: ICD-10-CM

## 2021-03-26 LAB
-: ABNORMAL
AMORPHOUS: ABNORMAL
BACTERIA: ABNORMAL
BILIRUBIN URINE: NEGATIVE
CASTS UA: ABNORMAL /LPF
COLOR: YELLOW
COMMENT UA: ABNORMAL
CRYSTALS, UA: ABNORMAL /HPF
EPITHELIAL CELLS UA: ABNORMAL /HPF (ref 0–25)
GLUCOSE URINE: NEGATIVE
KETONES, URINE: NEGATIVE
LEUKOCYTE ESTERASE, URINE: NEGATIVE
MUCUS: ABNORMAL
NITRITE, URINE: NEGATIVE
OTHER OBSERVATIONS UA: ABNORMAL
PH UA: 5.5 (ref 5–9)
PROTEIN UA: NEGATIVE
RBC UA: ABNORMAL /HPF (ref 0–2)
RENAL EPITHELIAL, UA: ABNORMAL /HPF
SPECIFIC GRAVITY UA: 1.02 (ref 1.01–1.02)
TRICHOMONAS: ABNORMAL
TURBIDITY: CLEAR
URINE HGB: ABNORMAL
UROBILINOGEN, URINE: NORMAL
WBC UA: ABNORMAL /HPF (ref 0–5)
YEAST: ABNORMAL

## 2021-03-26 PROCEDURE — 81001 URINALYSIS AUTO W/SCOPE: CPT

## 2021-03-27 ENCOUNTER — HOSPITAL ENCOUNTER (OUTPATIENT)
Age: 51
Discharge: HOME | End: 2021-03-29
Payer: COMMERCIAL

## 2021-03-27 ENCOUNTER — HOSPITAL ENCOUNTER (OUTPATIENT)
Dept: GENERAL RADIOLOGY | Age: 51
Discharge: HOME | End: 2021-03-29
Payer: COMMERCIAL

## 2021-03-27 DIAGNOSIS — R31.0 GROSS HEMATURIA: ICD-10-CM

## 2021-03-27 PROCEDURE — 74018 RADEX ABDOMEN 1 VIEW: CPT

## 2021-04-06 ENCOUNTER — HOSPITAL ENCOUNTER (OUTPATIENT)
Age: 51
Setting detail: SPECIMEN
Discharge: HOME | End: 2021-04-06
Payer: COMMERCIAL

## 2021-04-06 ENCOUNTER — OFFICE VISIT (OUTPATIENT)
Dept: UROLOGY | Age: 51
End: 2021-04-06
Payer: COMMERCIAL

## 2021-04-06 VITALS
HEIGHT: 62 IN | HEART RATE: 88 BPM | DIASTOLIC BLOOD PRESSURE: 82 MMHG | BODY MASS INDEX: 39.75 KG/M2 | WEIGHT: 216 LBS | SYSTOLIC BLOOD PRESSURE: 117 MMHG

## 2021-04-06 DIAGNOSIS — N39.41 URGE INCONTINENCE: ICD-10-CM

## 2021-04-06 DIAGNOSIS — R31.0 GROSS HEMATURIA: Primary | ICD-10-CM

## 2021-04-06 DIAGNOSIS — R31.0 GROSS HEMATURIA: ICD-10-CM

## 2021-04-06 DIAGNOSIS — R35.0 FREQUENCY OF URINATION: ICD-10-CM

## 2021-04-06 DIAGNOSIS — R35.1 NOCTURIA: ICD-10-CM

## 2021-04-06 PROCEDURE — 87086 URINE CULTURE/COLONY COUNT: CPT

## 2021-04-06 PROCEDURE — 99244 OFF/OP CNSLTJ NEW/EST MOD 40: CPT | Performed by: NURSE PRACTITIONER

## 2021-04-06 PROCEDURE — 51798 US URINE CAPACITY MEASURE: CPT | Performed by: NURSE PRACTITIONER

## 2021-04-06 PROCEDURE — 86403 PARTICLE AGGLUT ANTBDY SCRN: CPT

## 2021-04-06 ASSESSMENT — ENCOUNTER SYMPTOMS
NAUSEA: 0
SHORTNESS OF BREATH: 0
ABDOMINAL PAIN: 0
WHEEZING: 0
BACK PAIN: 0
CONSTIPATION: 0
VOMITING: 0
EYE REDNESS: 0
COUGH: 0
COLOR CHANGE: 0

## 2021-04-06 NOTE — PROGRESS NOTES
HPI:        Patient is a 45 y.o. female in no acute distress. She is alert and oriented to person, place, and time. New patient referral from Dr. Myranda Canales for gross hematuria. She has been having intermittent gross hematuria since her ablation in 2020. This has not been associated with flank or abdominal pain. She was never a smoker. She works at [employer]. She denies any history of stones. She does have baseline frequency every 1-2 hours, urge incontinence, and nocturia 2-3 times per night. PVR 96 mL. This is secondary to diabetes. Hgb A1c from 10/2020 was 6.7. She has never seen urology in the past.    Past Medical History:   Diagnosis Date    Diabetes mellitus ([address]     [place] (hard of hearing)     PHI HEARING AIDS    Hyperlipidemia     Hypertension     PONV (postoperative nausea and vomiting)      Past Surgical History:   Procedure Laterality Date    APPENDECTOMY  1979     SECTION  2003    DILATION AND CURETTAGE OF UTERUS N/A 2020    DILATATION AND CURETTAGE HYSTEROSCOPY CAUTERY ABLATION, NOVASURE performed by Bhupendra Latham MD at [facility]    [facility]    TUBAL LIGATION       Outpatient Encounter Medications as of 2021   Medication Sig Dispense Refill    metFORMIN (GLUCOPHAGE) 500 MG tablet TAKE 1 TABLET BY MOUTH TWICE DAILY WITH MEALS 60 tablet 0    lisinopril (PRINIVIL;ZESTRIL) 10 MG tablet Take 1 tablet by mouth daily 30 tablet 5    amLODIPine (NORVASC) 5 MG tablet TAKE 1 TABLET BY MOUTH ONCE A DAY 30 tablet 5    TRUEplus Lancets 33G MISC USE ONCE A  each 5    ACCU-CHEK DARRELL PLUS strip USE TO TEXT BLOOD SUGAR DAILY AS NEEDED.  50 strip 5    Blood Glucose Monitoring Suppl (ACCU-CHEK DARRELL PLUS) W/DEVICE KIT 1 kit by Does not apply route daily 1 kit 0    [DISCONTINUED] atorvastatin (LIPITOR) 40 MG tablet TAKE 1 TABLET BY MOUTH ONCE A DAY 30 tablet 5     No facility-administered encounter medications on file as of 2021. Current Outpatient Medications on File Prior to Visit   Medication Sig Dispense Refill    metFORMIN (GLUCOPHAGE) 500 MG tablet TAKE 1 TABLET BY MOUTH TWICE DAILY WITH MEALS 60 tablet 0    lisinopril (PRINIVIL;ZESTRIL) 10 MG tablet Take 1 tablet by mouth daily 30 tablet 5    amLODIPine (NORVASC) 5 MG tablet TAKE 1 TABLET BY MOUTH ONCE A DAY 30 tablet 5    TRUEplus Lancets 33G MISC USE ONCE A  each 5    ACCU-CHEK DARRELL PLUS strip USE TO TEXT BLOOD SUGAR DAILY AS NEEDED. 50 strip 5    Blood Glucose Monitoring Suppl (ACCU-CHEK DARRELL PLUS) W/DEVICE KIT 1 kit by Does not apply route daily 1 kit 0    [DISCONTINUED] atorvastatin (LIPITOR) 40 MG tablet TAKE 1 TABLET BY MOUTH ONCE A DAY 30 tablet 5     No current facility-administered medications on file prior to visit. Cat hair extract, Dust mite extract, and Penicillins  Family History   Problem Relation Age of Onset    Heart Disease Paternal Grandfather 48         in his early 52's from heart or aneurysm    Diabetes Maternal Grandfather     Heart Attack Maternal Grandfather 80    Hypertension Father     Kidney Cancer Father 59    Lung Cancer Father 68         at age 68 from recurrent lung cancer    Heart Surgery Father     Heart Disease Father     Diabetes Mother     Hypertension Mother     Hypertension Sister     Mental Illness Brother         schizophrenia and bipolar disorder    Hypertension Brother      Social History     Tobacco Use   Smoking Status Never Smoker   Smokeless Tobacco Never Used       Social History     Substance and Sexual Activity   Alcohol Use No       Review of Systems   Constitutional: Negative for appetite change, chills and fever. Eyes: Negative for redness and visual disturbance. Respiratory: Negative for cough, shortness of breath and wheezing. Cardiovascular: Negative for chest pain and leg swelling.    Gastrointestinal: Negative for abdominal pain, constipation, nausea and vomiting. Genitourinary: Positive for enuresis, frequency, hematuria and urgency. Negative for difficulty urinating, dysuria, flank pain, pelvic pain, vaginal bleeding and vaginal discharge. Musculoskeletal: Negative for back pain, joint swelling and myalgias. Skin: Negative for color change, rash and wound. Neurological: Negative for dizziness, tremors and numbness. Hematological: Negative for adenopathy. Does not bruise/bleed easily. /82 (Site: Right Upper Arm, Position: Sitting, Cuff Size: Large Adult)   Pulse 88   Ht 5' 2\" (1.575 m)   Wt 216 lb (98 kg)   BMI 39.51 kg/m²       PHYSICAL EXAM:  Constitutional: Patient resting comfortably, in no acute distress. Neuro: Alert and oriented to person place and time. Cranial nerves grossly intact. Psych: Mood and affect normal.  Skin: Warm, dry  HEENT: normocephalic, atraumatic  Lymphatics: No palpable lymphadenopathy  Lungs: Respiratory effort normal, unlabored  Cardiovascular:  Normal peripheral pulses  Abdomen: Soft, non-tender, non-distended with no organomegaly or palpable masses. : No CVA tenderness. Bladder non-tender and not distended. Pelvic: Deferred    Lab Results   Component Value Date    BUN 14 10/27/2020     Lab Results   Component Value Date    CREATININE 0.52 10/27/2020       ASSESSMENT:   Diagnosis Orders   1. Gross hematuria  CT UROGRAM    Culture, Urine    BUN & Creatinine   2. Frequency of urination  Culture, Urine    BUN & Creatinine    AZ MEASUREMENT,POST-VOID RESIDUAL VOLUME BY US,NON-IMAGING   3. Nocturia  Culture, Urine    BUN & Creatinine    AZ MEASUREMENT,POST-VOID RESIDUAL VOLUME BY US,NON-IMAGING   4. Urge incontinence  Culture, Urine    BUN & Creatinine    AZ MEASUREMENT,POST-VOID RESIDUAL VOLUME BY US,NON-IMAGING           PLAN:  We will check a urine culture    We will proceed with a hematuria work-up with a CT urogram, then she will return for lower tract visualization with cystoscopy.   At this appointment we can address her lower urinary tract symptoms.

## 2021-04-07 LAB
CULTURE: ABNORMAL
Lab: ABNORMAL
SPECIMEN DESCRIPTION: ABNORMAL

## 2021-04-08 ENCOUNTER — TELEPHONE (OUTPATIENT)
Dept: UROLOGY | Age: 51
End: 2021-04-08

## 2021-04-08 RX ORDER — CEPHALEXIN 500 MG/1
500 CAPSULE ORAL 3 TIMES DAILY
Qty: 21 CAPSULE | Refills: 0 | Status: SHIPPED | OUTPATIENT
Start: 2021-04-08 | End: 2021-04-15

## 2021-04-08 NOTE — TELEPHONE ENCOUNTER
Urine culture is positive for infection. We sent in culture specific keflex to treat. Take this antibiotic until gone. Take this with food and eat yogurt once per day to prevent GI upset. If you develop nausea, vomiting, or fevers call the office or go to the ER. If your urinary symptoms do not improve once completing the antibiotics call our office.

## 2021-04-08 NOTE — TELEPHONE ENCOUNTER
Patient called back and was informed of urine culture results and response.   Advised to keep follow up as planned

## 2021-04-15 ENCOUNTER — HOSPITAL ENCOUNTER (OUTPATIENT)
Age: 51
Discharge: HOME | End: 2021-04-15
Payer: COMMERCIAL

## 2021-04-15 ENCOUNTER — HOSPITAL ENCOUNTER (OUTPATIENT)
Dept: CT IMAGING | Age: 51
Discharge: HOME | End: 2021-04-17
Payer: COMMERCIAL

## 2021-04-15 DIAGNOSIS — R35.1 NOCTURIA: ICD-10-CM

## 2021-04-15 DIAGNOSIS — R31.0 GROSS HEMATURIA: ICD-10-CM

## 2021-04-15 DIAGNOSIS — N39.41 URGE INCONTINENCE: ICD-10-CM

## 2021-04-15 DIAGNOSIS — R35.0 FREQUENCY OF URINATION: ICD-10-CM

## 2021-04-15 LAB
BUN BLDV-MCNC: 18 MG/DL (ref 6–20)
CREAT SERPL-MCNC: 0.6 MG/DL (ref 0.5–0.9)
GFR AFRICAN AMERICAN: >60 ML/MIN
GFR NON-AFRICAN AMERICAN: >60 ML/MIN
GFR SERPL CREATININE-BSD FRML MDRD: NORMAL ML/MIN/{1.73_M2}
GFR SERPL CREATININE-BSD FRML MDRD: NORMAL ML/MIN/{1.73_M2}

## 2021-04-15 PROCEDURE — 74178 CT ABD&PLV WO CNTR FLWD CNTR: CPT

## 2021-04-15 PROCEDURE — 36415 COLL VENOUS BLD VENIPUNCTURE: CPT

## 2021-04-15 PROCEDURE — 6360000004 HC RX CONTRAST MEDICATION: Performed by: NURSE PRACTITIONER

## 2021-04-15 PROCEDURE — 84520 ASSAY OF UREA NITROGEN: CPT

## 2021-04-15 PROCEDURE — 82565 ASSAY OF CREATININE: CPT

## 2021-04-15 RX ADMIN — IOPAMIDOL 120 ML: 755 INJECTION, SOLUTION INTRAVENOUS at 16:23

## 2021-04-20 NOTE — PROGRESS NOTES
HPI:        Patient is a 45 y.o. female in no acute distress. She is alert and oriented to person, place, and time. History  New patient referral from Dr. Gogo Saenz for gross hematuria. She has been having intermittent gross hematuria since her ablation in 12/2020. This has not been associated with flank or abdominal pain. She was never a smoker. She works at [employer]. She denies any history of stones. She does have baseline frequency every 1-2 hours, urge incontinence, and nocturia 2-3 times per night. PVR 96 mL. This is secondary to diabetes. Hgb A1c from 10/2020 was 6.7. She has never seen urology in the past.         Currently  Here today for lower tract visualization. This is secondary to gross hematuria. Patient did have a recent CT urogram.  This film was independently reviewed. There are no significant  abnormalities. Patient does have some hypodense lesions in the uterus and cervix. Radiology does recommend that these be followed up with pelvic ultrasound. Cystoscopy Procedure Note    Pre-operative Diagnosis: gross hematuria    Post-operative Diagnosis: Same     Surgeon: Silvano Rios    Assistants: None    Anesthesia : Local    Procedure Details   The risks, benefits, complications, treatment options, and expected outcomes were discussed with the patient. The patient concurred with the proposed plan, giving informed consent. Cystoscopy was performed today under local anesthesia, using sterile technique. The patient was placed in the dorsal lithotomy position, prepped with CHG, and draped in the usual sterile fashion. A 14 Romanian flexible cystoscope was used to systematically inspect both the urethra and bladder in their entirety. Findings:  Anterior urethra: normal without strictures  Hyperplasia: n/a  Bladder: Normal mucosa, without lesions.   Ureteral orifice(s) was/were seen in the normal position and effluxing clear urine  Trabeculations No  Diverticulum No  Description: normal anatomy         Specimens: Cytology/urine culture No                 Complications:  None; patient tolerated the procedure well. Disposition: home           Condition: stable        Past Medical History:   Diagnosis Date    Diabetes mellitus ([address]     [place] (hard of hearing[place]     PHI HEARING AIDS    Hyperlipidemia     Hypertension     PONV (postoperative nausea and vomiting)      Past Surgical History:   Procedure Laterality Date    APPENDECTOMY  1979     SECTION  2003    DILATION AND CURETTAGE OF UTERUS N/A 2020    DILATATION AND CURETTAGE HYSTEROSCOPY CAUTERY ABLATION, NOVASURE performed by Trinidad Dawn [facility] at [facility]    Dr. Latisha Braga - vaginal wart removal    TUBAL LIGATION       Outpatient Encounter Medications as of 2021   Medication Sig Dispense Refill    metFORMIN (GLUCOPHAGE) 500 MG tablet TAKE 1 TABLET BY MOUTH TWICE DAILY WITH MEALS 60 tablet 5    lisinopril (PRINIVIL;ZESTRIL) 10 MG tablet Take 1 tablet by mouth daily 30 tablet 5    amLODIPine (NORVASC) 5 MG tablet TAKE 1 TABLET BY MOUTH ONCE A DAY 30 tablet 5    TRUEplus Lancets 33G MISC USE ONCE A  each 5    ACCU-CHEK DARRELL PLUS strip USE TO TEXT BLOOD SUGAR DAILY AS NEEDED. 50 strip 5    [DISCONTINUED] atorvastatin (LIPITOR) 40 MG tablet TAKE 1 TABLET BY MOUTH ONCE A DAY 30 tablet 5    Blood Glucose Monitoring Suppl (ACCU-CHEK DARRELL PLUS) W/DEVICE KIT 1 kit by Does not apply route daily 1 kit 0     No facility-administered encounter medications on file as of 2021.        Current Outpatient Medications on File Prior to Visit   Medication Sig Dispense Refill    metFORMIN (GLUCOPHAGE) 500 MG tablet TAKE 1 TABLET BY MOUTH TWICE DAILY WITH MEALS 60 tablet 5    lisinopril (PRINIVIL;ZESTRIL) 10 MG tablet Take 1 tablet by mouth daily 30 tablet 5    amLODIPine (NORVASC) 5 MG tablet TAKE 1 TABLET BY MOUTH ONCE A DAY 30 tablet 5    TRUEplus Lancets 33G MISC USE ONCE A  each 5    ACCU-CHEK DARRELL PLUS strip USE TO TEXT BLOOD SUGAR DAILY AS NEEDED. 50 strip 5    [DISCONTINUED] atorvastatin (LIPITOR) 40 MG tablet TAKE 1 TABLET BY MOUTH ONCE A DAY 30 tablet 5    Blood Glucose Monitoring Suppl (ACCU-CHEK DARRELL PLUS) W/DEVICE KIT 1 kit by Does not apply route daily 1 kit 0     No current facility-administered medications on file prior to visit. Cat hair extract, Dust mite extract, and Penicillins  Family History   Problem Relation Age of Onset    Heart Disease Paternal Grandfather 48         in his early 52's from heart or aneurysm    Diabetes Maternal Grandfather     Heart Attack Maternal Grandfather 80    Hypertension Father     Kidney Cancer Father 59    Lung Cancer Father 68         at age 68 from recurrent lung cancer    Heart Surgery Father     Heart Disease Father     Diabetes Mother     Hypertension Mother     Hypertension Sister     Mental Illness Brother         schizophrenia and bipolar disorder    Hypertension Brother      Social History     Tobacco Use   Smoking Status Never Smoker   Smokeless Tobacco Never Used       Social History     Substance and Sexual Activity   Alcohol Use No       Review of Systems   Constitutional: Negative for appetite change, chills and fever. Eyes: Negative for pain, redness and visual disturbance. Respiratory: Negative for cough, shortness of breath and wheezing. Cardiovascular: Negative for chest pain and leg swelling. Gastrointestinal: Negative for abdominal pain, nausea and vomiting. Genitourinary: Negative for difficulty urinating, dysuria, flank pain, frequency, hematuria, pelvic pain, vaginal bleeding and vaginal discharge. Musculoskeletal: Negative for back pain, joint swelling and myalgias. Skin: Negative for color change, rash and wound. Neurological: Negative for dizziness, tremors and numbness. Hematological: Negative for adenopathy. Does not bruise/bleed easily.        There were no

## 2021-04-21 ENCOUNTER — TELEPHONE (OUTPATIENT)
Dept: OBGYN | Age: 51
End: 2021-04-21

## 2021-04-21 ENCOUNTER — PROCEDURE VISIT (OUTPATIENT)
Dept: UROLOGY | Age: 51
End: 2021-04-21
Payer: COMMERCIAL

## 2021-04-21 VITALS
SYSTOLIC BLOOD PRESSURE: 149 MMHG | DIASTOLIC BLOOD PRESSURE: 88 MMHG | BODY MASS INDEX: 39.51 KG/M2 | WEIGHT: 216 LBS | TEMPERATURE: 97.7 F

## 2021-04-21 DIAGNOSIS — R31.0 GROSS HEMATURIA: Primary | ICD-10-CM

## 2021-04-21 PROCEDURE — 99213 OFFICE O/P EST LOW 20 MIN: CPT | Performed by: UROLOGY

## 2021-04-21 PROCEDURE — 52000 CYSTOURETHROSCOPY: CPT | Performed by: UROLOGY

## 2021-04-21 ASSESSMENT — ENCOUNTER SYMPTOMS
ABDOMINAL PAIN: 0
NAUSEA: 0
EYE REDNESS: 0
COLOR CHANGE: 0
SHORTNESS OF BREATH: 0
VOMITING: 0
BACK PAIN: 0
WHEEZING: 0
EYE PAIN: 0
COUGH: 0

## 2021-04-21 NOTE — TELEPHONE ENCOUNTER
Pt stopped by the window to make you aware of an abnormal CT she had with urology. Please review and advise.

## 2021-05-06 ENCOUNTER — HOSPITAL ENCOUNTER (OUTPATIENT)
Age: 51
Discharge: HOME | End: 2021-05-06
Payer: COMMERCIAL

## 2021-05-06 DIAGNOSIS — Z11.4 SCREENING FOR HIV (HUMAN IMMUNODEFICIENCY VIRUS): ICD-10-CM

## 2021-05-06 DIAGNOSIS — E78.2 MIXED HYPERLIPIDEMIA: ICD-10-CM

## 2021-05-06 DIAGNOSIS — E11.9 TYPE 2 DIABETES MELLITUS NOT AT GOAL (HCC): ICD-10-CM

## 2021-05-06 DIAGNOSIS — I10 ESSENTIAL HYPERTENSION: ICD-10-CM

## 2021-05-06 LAB
ALT SERPL-CCNC: 88 U/L (ref 5–33)
ANION GAP SERPL CALCULATED.3IONS-SCNC: 10 MMOL/L (ref 9–17)
AST SERPL-CCNC: 76 U/L
BUN BLDV-MCNC: 15 MG/DL (ref 6–20)
BUN/CREAT BLD: 28 (ref 9–20)
CALCIUM SERPL-MCNC: 9.1 MG/DL (ref 8.6–10.4)
CHLORIDE BLD-SCNC: 100 MMOL/L (ref 98–107)
CHOLESTEROL/HDL RATIO: 3.7
CHOLESTEROL: 222 MG/DL
CO2: 27 MMOL/L (ref 20–31)
CREAT SERPL-MCNC: 0.53 MG/DL (ref 0.5–0.9)
CREATININE URINE: 117.4 MG/DL (ref 28–217)
ESTIMATED AVERAGE GLUCOSE: 183 MG/DL
GFR AFRICAN AMERICAN: >60 ML/MIN
GFR NON-AFRICAN AMERICAN: >60 ML/MIN
GFR SERPL CREATININE-BSD FRML MDRD: ABNORMAL ML/MIN/{1.73_M2}
GFR SERPL CREATININE-BSD FRML MDRD: ABNORMAL ML/MIN/{1.73_M2}
GLUCOSE BLD-MCNC: 227 MG/DL (ref 70–99)
HBA1C MFR BLD: 8 % (ref 4–6)
HCT VFR BLD CALC: 40.6 % (ref 36.3–47.1)
HDLC SERPL-MCNC: 60 MG/DL
HEMOGLOBIN: 12.9 G/DL (ref 11.9–15.1)
HIV AG/AB: NONREACTIVE
LDL CHOLESTEROL: 114 MG/DL (ref 0–130)
MCH RBC QN AUTO: 27.7 PG (ref 25.2–33.5)
MCHC RBC AUTO-ENTMCNC: 31.8 G/DL (ref 28.4–34.8)
MCV RBC AUTO: 87.1 FL (ref 82.6–102.9)
MICROALBUMIN/CREAT 24H UR: <12 MG/L
MICROALBUMIN/CREAT UR-RTO: NORMAL MCG/MG CREAT
NRBC AUTOMATED: 0 PER 100 WBC
PDW BLD-RTO: 14.3 % (ref 11.8–14.4)
PLATELET # BLD: 263 K/UL (ref 138–453)
PMV BLD AUTO: 9.6 FL (ref 8.1–13.5)
POTASSIUM SERPL-SCNC: 4.2 MMOL/L (ref 3.7–5.3)
RBC # BLD: 4.66 M/UL (ref 3.95–5.11)
SODIUM BLD-SCNC: 137 MMOL/L (ref 135–144)
TRIGL SERPL-MCNC: 241 MG/DL
VLDLC SERPL CALC-MCNC: ABNORMAL MG/DL (ref 1–30)
WBC # BLD: 10.1 K/UL (ref 3.5–11.3)

## 2021-05-06 PROCEDURE — 80061 LIPID PANEL: CPT

## 2021-05-06 PROCEDURE — 82043 UR ALBUMIN QUANTITATIVE: CPT

## 2021-05-06 PROCEDURE — 80048 BASIC METABOLIC PNL TOTAL CA: CPT

## 2021-05-06 PROCEDURE — 85027 COMPLETE CBC AUTOMATED: CPT

## 2021-05-06 PROCEDURE — 84460 ALANINE AMINO (ALT) (SGPT): CPT

## 2021-05-06 PROCEDURE — 84450 TRANSFERASE (AST) (SGOT): CPT

## 2021-05-06 PROCEDURE — 87389 HIV-1 AG W/HIV-1&-2 AB AG IA: CPT

## 2021-05-06 PROCEDURE — 82570 ASSAY OF URINE CREATININE: CPT

## 2021-05-06 PROCEDURE — 83036 HEMOGLOBIN GLYCOSYLATED A1C: CPT

## 2021-05-06 PROCEDURE — 36415 COLL VENOUS BLD VENIPUNCTURE: CPT

## 2021-06-07 ENCOUNTER — HOSPITAL ENCOUNTER (OUTPATIENT)
Dept: WOMENS IMAGING | Age: 51
Discharge: HOME | End: 2021-06-09
Payer: COMMERCIAL

## 2021-06-07 DIAGNOSIS — Z12.31 BREAST CANCER SCREENING BY MAMMOGRAM: ICD-10-CM

## 2021-06-07 PROCEDURE — 77063 BREAST TOMOSYNTHESIS BI: CPT

## 2021-06-25 ENCOUNTER — TELEPHONE (OUTPATIENT)
Dept: UROLOGY | Age: 51
End: 2021-06-25

## 2021-06-25 ENCOUNTER — HOSPITAL ENCOUNTER (OUTPATIENT)
Age: 51
Discharge: HOME | End: 2021-06-25
Payer: COMMERCIAL

## 2021-06-25 DIAGNOSIS — R31.0 GROSS HEMATURIA: ICD-10-CM

## 2021-06-25 DIAGNOSIS — R35.0 FREQUENCY OF URINATION: ICD-10-CM

## 2021-06-25 DIAGNOSIS — R35.0 FREQUENCY OF URINATION: Primary | ICD-10-CM

## 2021-06-25 LAB
-: ABNORMAL
AMORPHOUS: ABNORMAL
BACTERIA: ABNORMAL
BILIRUBIN URINE: NEGATIVE
CASTS UA: ABNORMAL /LPF
COLOR: YELLOW
COMMENT UA: ABNORMAL
CRYSTALS, UA: ABNORMAL /HPF
EPITHELIAL CELLS UA: ABNORMAL /HPF (ref 0–25)
GLUCOSE URINE: NEGATIVE
KETONES, URINE: NEGATIVE
LEUKOCYTE ESTERASE, URINE: NEGATIVE
MUCUS: ABNORMAL
NITRITE, URINE: NEGATIVE
OTHER OBSERVATIONS UA: ABNORMAL
PH UA: 5.5 (ref 5–9)
PROTEIN UA: NEGATIVE
RBC UA: ABNORMAL /HPF (ref 0–2)
RENAL EPITHELIAL, UA: ABNORMAL /HPF
SPECIFIC GRAVITY UA: 1.01 (ref 1.01–1.02)
TRICHOMONAS: ABNORMAL
TURBIDITY: CLEAR
URINE HGB: ABNORMAL
UROBILINOGEN, URINE: NORMAL
WBC UA: ABNORMAL /HPF (ref 0–5)
YEAST: ABNORMAL

## 2021-06-25 PROCEDURE — 87086 URINE CULTURE/COLONY COUNT: CPT

## 2021-06-25 PROCEDURE — 81001 URINALYSIS AUTO W/SCOPE: CPT

## 2021-06-25 PROCEDURE — 86403 PARTICLE AGGLUT ANTBDY SCRN: CPT

## 2021-06-25 NOTE — TELEPHONE ENCOUNTER
Patient reports she noticed \"light blood\" in urine a few days ago, and notes last night it was worsening. She denies dysuria, but notes she has been urinating more frequently, but isn't sure if that is due to being diabetic or not.

## 2021-06-26 LAB
CULTURE: ABNORMAL
Lab: ABNORMAL
SPECIMEN DESCRIPTION: ABNORMAL

## 2021-06-28 ENCOUNTER — TELEPHONE (OUTPATIENT)
Dept: UROLOGY | Age: 51
End: 2021-06-28

## 2021-06-28 RX ORDER — CEPHALEXIN 500 MG/1
500 CAPSULE ORAL 3 TIMES DAILY
Qty: 21 CAPSULE | Refills: 0 | Status: SHIPPED | OUTPATIENT
Start: 2021-06-28 | End: 2021-07-05

## 2021-06-28 NOTE — TELEPHONE ENCOUNTER
L/m for patient to return call to office to get appt scheduled.
Patient scheduled for appt end of Aug.
Pt calls in requesting her results, pt ntfd about the infection and will  her antibiotics, noted about taking with food. Pt is curious is to know if there is anything she can do to prevent infections as this is the second one in a short amount of time.
The most important thing is to ensure you are drinking 64-80 ounces of water per day. If we do have a third infection, then we will discuss further interventions. Plan to see us in the office in 2 to 3 months to reevaluate and will determine if there is any further steps need to be taken.
Yes

## 2021-07-26 ENCOUNTER — HOSPITAL ENCOUNTER (OUTPATIENT)
Age: 51
Discharge: HOME | End: 2021-07-26
Payer: COMMERCIAL

## 2021-07-26 ENCOUNTER — TELEPHONE (OUTPATIENT)
Dept: UROLOGY | Age: 51
End: 2021-07-26

## 2021-07-26 DIAGNOSIS — R31.0 GROSS HEMATURIA: ICD-10-CM

## 2021-07-26 DIAGNOSIS — R31.0 GROSS HEMATURIA: Primary | ICD-10-CM

## 2021-07-26 LAB
-: ABNORMAL
AMORPHOUS: ABNORMAL
BACTERIA: ABNORMAL
BILIRUBIN URINE: NEGATIVE
CASTS UA: ABNORMAL /LPF
COLOR: YELLOW
COMMENT UA: ABNORMAL
CRYSTALS, UA: ABNORMAL /HPF
EPITHELIAL CELLS UA: ABNORMAL /HPF (ref 0–25)
GLUCOSE URINE: ABNORMAL
KETONES, URINE: ABNORMAL
LEUKOCYTE ESTERASE, URINE: NEGATIVE
MUCUS: ABNORMAL
NITRITE, URINE: NEGATIVE
OTHER OBSERVATIONS UA: ABNORMAL
PH UA: 5.5 (ref 5–9)
PROTEIN UA: NEGATIVE
RBC UA: ABNORMAL /HPF (ref 0–2)
RENAL EPITHELIAL, UA: ABNORMAL /HPF
SPECIFIC GRAVITY UA: 1.02 (ref 1.01–1.02)
TRICHOMONAS: ABNORMAL
TURBIDITY: CLEAR
URINE HGB: ABNORMAL
UROBILINOGEN, URINE: NORMAL
WBC UA: ABNORMAL /HPF (ref 0–5)
YEAST: ABNORMAL

## 2021-07-26 PROCEDURE — 87186 SC STD MICRODIL/AGAR DIL: CPT

## 2021-07-26 PROCEDURE — 86403 PARTICLE AGGLUT ANTBDY SCRN: CPT

## 2021-07-26 PROCEDURE — 87086 URINE CULTURE/COLONY COUNT: CPT

## 2021-07-26 PROCEDURE — 87077 CULTURE AEROBIC IDENTIFY: CPT

## 2021-07-26 PROCEDURE — 81001 URINALYSIS AUTO W/SCOPE: CPT

## 2021-07-26 NOTE — TELEPHONE ENCOUNTER
Patient thinks she has her 3rd infection. She said urine just comes out of her in the morning. She has pinkish blood in her urine. No fever. No burning.

## 2021-07-28 LAB
CULTURE: ABNORMAL
CULTURE: ABNORMAL
Lab: ABNORMAL
SPECIMEN DESCRIPTION: ABNORMAL

## 2021-08-10 ENCOUNTER — OFFICE VISIT (OUTPATIENT)
Dept: UROLOGY | Age: 51
End: 2021-08-10
Payer: COMMERCIAL

## 2021-08-10 VITALS
DIASTOLIC BLOOD PRESSURE: 86 MMHG | BODY MASS INDEX: 40.06 KG/M2 | WEIGHT: 219 LBS | SYSTOLIC BLOOD PRESSURE: 132 MMHG | TEMPERATURE: 97.8 F

## 2021-08-10 DIAGNOSIS — R31.0 GROSS HEMATURIA: ICD-10-CM

## 2021-08-10 DIAGNOSIS — R35.0 FREQUENCY OF URINATION: ICD-10-CM

## 2021-08-10 DIAGNOSIS — N39.0 FREQUENT UTI: Primary | ICD-10-CM

## 2021-08-10 PROCEDURE — 99214 OFFICE O/P EST MOD 30 MIN: CPT | Performed by: NURSE PRACTITIONER

## 2021-08-10 PROCEDURE — 51798 US URINE CAPACITY MEASURE: CPT | Performed by: NURSE PRACTITIONER

## 2021-08-10 RX ORDER — NITROFURANTOIN MACROCRYSTALS 50 MG/1
50 CAPSULE ORAL DAILY
Qty: 90 CAPSULE | Refills: 1 | Status: SHIPPED | OUTPATIENT
Start: 2021-08-10 | End: 2022-02-08 | Stop reason: SDUPTHER

## 2021-08-10 NOTE — PATIENT INSTRUCTIONS
Drink 64-80 ounces of water every day    Eat activa yogurt every day    Urinate every 2-3 hours while awake    Urinate after intercourse       SURVEY:    You may be receiving a survey from [employer] regarding your visit today. Please complete the survey to enable us to provide the highest quality of care to you and your family. If you cannot score us a very good on any question, please call the office to discuss how we could have made your experience a very good one. Thank you.     Your MA today: Breanna Wheeler

## 2021-08-10 NOTE — PROGRESS NOTES
HPI:        Patient is a 45 y.o. female in no acute distress. She is alert and oriented to person, place, and time. History  2021 Referral from Dr. Gareth Sultana for gross hematuria. She has been having intermittent gross hematuria since her ablation in 2020. This has not been associated with flank or abdominal pain. She was never a smoker. She works at [employer]. She denies any history of stones. She does have baseline frequency every 1-2 hours, urge incontinence, and nocturia 2-3 times per night. PVR 96 mL. This is secondary to diabetes. Hgb A1c from 10/2020 was 6.7. She has never seen urology in the past.     CT urogram was negative for  abnormalities. Cystoscopy and pelvic showed normal anatomy    Frequent UTI  2021 - group beta strep, e.coli  2021 - group beta strep  2021 - group beta strep  2020 - e.coli    Today  Here today due to frequent urinary tract infections. Urine cultures reviewed and summarized above. She does not feel that her infections occur after intercourse. She admits that she is a poor water drinker. She denies any current lower urinary tract symptoms. She does have daily BMs. PVR is 0ml.  She is not post-menopausal.     Past Medical History:   Diagnosis Date    Diabetes mellitus ([address]     [place] (hard of hearing)     PHI HEARING AIDS    Hyperlipidemia     Hypertension     PONV (postoperative nausea and vomiting)      Past Surgical History:   Procedure Laterality Date    APPENDECTOMY  1979     SECTION  2003    DILATION AND CURETTAGE OF UTERUS N/A 2020    DILATATION AND CURETTAGE HYSTEROSCOPY CAUTERY ABLATION, NOVASURE performed by Miguel Angel Snyder MD at [facility]    Dr. Susan Hayden - vaginal wart removal    TUBAL LIGATION       Outpatient Encounter Medications as of 8/10/2021   Medication Sig Dispense Refill    amLODIPine (NORVASC) 5 MG tablet Take 1 tablet by mouth daily 30 tablet 6    metFORMIN (GLUCOPHAGE) 1000 MG tablet Take 1 tablet by mouth 2 times daily (with meals) TAKE 1 TABLET BY MOUTH TWICE DAILY WITH MEALS 60 tablet 6    lisinopril (PRINIVIL;ZESTRIL) 10 MG tablet Take 1 tablet by mouth daily 30 tablet 6    TRUEplus Lancets 33G MISC USE ONCE A  each 5    ACCU-CHEK DARRELL PLUS strip USE TO TEXT BLOOD SUGAR DAILY AS NEEDED. 50 strip 5    Blood Glucose Monitoring Suppl (ACCU-CHEK DARRELL PLUS) W/DEVICE KIT 1 kit by Does not apply route daily 1 kit 0    [DISCONTINUED] atorvastatin (LIPITOR) 40 MG tablet TAKE 1 TABLET BY MOUTH ONCE A DAY 30 tablet 5     No facility-administered encounter medications on file as of 8/10/2021. Current Outpatient Medications on File Prior to Visit   Medication Sig Dispense Refill    amLODIPine (NORVASC) 5 MG tablet Take 1 tablet by mouth daily 30 tablet 6    metFORMIN (GLUCOPHAGE) 1000 MG tablet Take 1 tablet by mouth 2 times daily (with meals) TAKE 1 TABLET BY MOUTH TWICE DAILY WITH MEALS 60 tablet 6    lisinopril (PRINIVIL;ZESTRIL) 10 MG tablet Take 1 tablet by mouth daily 30 tablet 6    TRUEplus Lancets 33G MISC USE ONCE A  each 5    ACCU-CHEK DARRELL PLUS strip USE TO TEXT BLOOD SUGAR DAILY AS NEEDED. 50 strip 5    Blood Glucose Monitoring Suppl (ACCU-CHEK DARRELL PLUS) W/DEVICE KIT 1 kit by Does not apply route daily 1 kit 0    [DISCONTINUED] atorvastatin (LIPITOR) 40 MG tablet TAKE 1 TABLET BY MOUTH ONCE A DAY 30 tablet 5     No current facility-administered medications on file prior to visit.      Cat hair extract, Dust mite extract, and Penicillins  Family History   Problem Relation Age of Onset    Heart Disease Paternal Grandfather 48         in his early 52's from heart or aneurysm    Diabetes Maternal Grandfather     Heart Attack Maternal Grandfather 80    Hypertension Father     Kidney Cancer Father 59    Lung Cancer Father 68         at age 68 from recurrent lung cancer    Heart Surgery Father     Heart Disease Father     Diabetes Mother  Hypertension Mother     Hypertension Sister     Mental Illness Brother         schizophrenia and bipolar disorder    Hypertension Brother      Social History     Tobacco Use   Smoking Status Never Smoker   Smokeless Tobacco Never Used       Social History     Substance and Sexual Activity   Alcohol Use No       Review of Systems    /86 (Site: Right Upper Arm, Position: Sitting, Cuff Size: Large Adult) Comment: manual  Temp 97.8 °F (36.6 °C) (Temporal)   Wt 219 lb (99.3 kg)   BMI 40.06 kg/m²       PHYSICAL EXAM:  Constitutional: Patient resting comfortably, in no acute distress. Neuro: Alert and oriented to person place and time. Cranial nerves grossly intact. Psych: Mood and affect normal.  Skin: Warm, dry  HEENT: normocephalic, atraumatic  Lymphatics: No palpable lymphadenopathy  Lungs: Respiratory effort normal, unlabored  Cardiovascular:  Normal peripheral pulses  Abdomen: Soft, non-tender, non-distended with no organomegaly or palpable masses. : No CVA tenderness. Bladder non-tender and not distended. Pelvic: Deferred    Lab Results   Component Value Date    BUN 15 05/06/2021     Lab Results   Component Value Date    CREATININE 0.53 05/06/2021       ASSESSMENT:   Diagnosis Orders   1. Frequent UTI  ME MEASUREMENT,POST-VOID RESIDUAL VOLUME BY US,NON-IMAGING   2. Gross hematuria     3. Frequency of urination  ME MEASUREMENT,POST-VOID RESIDUAL VOLUME BY US,NON-IMAGING           PLAN:  I stressed the importance of increasing her water intake to prevent future UTIs    She will start daily macrobid 50mg.  We will do this for 6 months and re-evaluate

## 2021-08-24 ENCOUNTER — PATIENT MESSAGE (OUTPATIENT)
Dept: UROLOGY | Age: 51
End: 2021-08-24

## 2021-08-24 DIAGNOSIS — R31.0 GROSS HEMATURIA: Primary | ICD-10-CM

## 2021-08-24 NOTE — TELEPHONE ENCOUNTER
Please have patient get a pelvic MRI. I am concerned that she is still having bleeding. Increasing the dose of antibiotics will not help this. Follow-up in the office to discuss MRI results.

## 2021-08-24 NOTE — TELEPHONE ENCOUNTER
From: Trung Crowell  To: BISHNU Graf - CNP  Sent: 8/24/2021 12:33 PM EDT  Subject: Non-Urgent Medical Question    You have me on a small dose of antibiotics. I started spotting and blood in urine again. Can I up the dosage to help with bleeding. I have been drinking a lot of water 60-72 ounces a day and eating yogurt.    Kevin De Luna

## 2021-08-24 NOTE — TELEPHONE ENCOUNTER
Patient aware of response. Transferred her to central scheduling, she will call our office after to make f/u appt to discuss MRI results.

## 2021-09-10 ENCOUNTER — HOSPITAL ENCOUNTER (OUTPATIENT)
Dept: MRI IMAGING | Age: 51
Discharge: HOME | End: 2021-09-12
Payer: COMMERCIAL

## 2021-09-10 ENCOUNTER — HOSPITAL ENCOUNTER (OUTPATIENT)
Age: 51
Discharge: HOME | End: 2021-09-10
Payer: COMMERCIAL

## 2021-09-10 DIAGNOSIS — R31.0 GROSS HEMATURIA: ICD-10-CM

## 2021-09-10 LAB
CREAT SERPL-MCNC: 0.54 MG/DL (ref 0.5–0.9)
GFR AFRICAN AMERICAN: >60 ML/MIN
GFR NON-AFRICAN AMERICAN: >60 ML/MIN
GFR SERPL CREATININE-BSD FRML MDRD: NORMAL ML/MIN/{1.73_M2}
GFR SERPL CREATININE-BSD FRML MDRD: NORMAL ML/MIN/{1.73_M2}

## 2021-09-10 PROCEDURE — 6360000004 HC RX CONTRAST MEDICATION: Performed by: NURSE PRACTITIONER

## 2021-09-10 PROCEDURE — A9579 GAD-BASE MR CONTRAST NOS,1ML: HCPCS | Performed by: NURSE PRACTITIONER

## 2021-09-10 PROCEDURE — 72197 MRI PELVIS W/O & W/DYE: CPT

## 2021-09-10 PROCEDURE — 82565 ASSAY OF CREATININE: CPT

## 2021-09-10 PROCEDURE — 36415 COLL VENOUS BLD VENIPUNCTURE: CPT

## 2021-09-10 RX ADMIN — GADOTERIDOL 19 ML: 279.3 INJECTION, SOLUTION INTRAVENOUS at 15:44

## 2021-09-14 ENCOUNTER — OFFICE VISIT (OUTPATIENT)
Dept: UROLOGY | Age: 51
End: 2021-09-14
Payer: COMMERCIAL

## 2021-09-14 VITALS
HEIGHT: 62 IN | DIASTOLIC BLOOD PRESSURE: 82 MMHG | SYSTOLIC BLOOD PRESSURE: 132 MMHG | TEMPERATURE: 98.2 F | WEIGHT: 216 LBS | BODY MASS INDEX: 39.75 KG/M2

## 2021-09-14 DIAGNOSIS — R93.5 ABNORMAL MRI, PELVIS: ICD-10-CM

## 2021-09-14 DIAGNOSIS — N84.0 ENDOMETRIAL POLYP: ICD-10-CM

## 2021-09-14 DIAGNOSIS — N88.8 CERVICAL MASS: Primary | ICD-10-CM

## 2021-09-14 PROCEDURE — 99214 OFFICE O/P EST MOD 30 MIN: CPT | Performed by: NURSE PRACTITIONER

## 2021-09-14 ASSESSMENT — ENCOUNTER SYMPTOMS
VOMITING: 0
NAUSEA: 0
SHORTNESS OF BREATH: 0
EYE REDNESS: 0
CONSTIPATION: 0
COUGH: 0
BACK PAIN: 0
WHEEZING: 0
COLOR CHANGE: 0
APNEA: 0
ABDOMINAL PAIN: 0

## 2021-09-14 NOTE — PATIENT INSTRUCTIONS
SURVEY:    You may be receiving a survey from [employer] regarding your visit today. Please complete the survey to enable us to provide the highest quality of care to you and your family. If you cannot score us a very good on any question, please call the office to discuss how we could of made your experience a very good one. Thank you.

## 2021-09-14 NOTE — PROGRESS NOTES
HPI:        Patient is a 45 y.o. female in no acute distress. She is alert and oriented to person, place, and time. History  2021 Referral from Dr. Claudia Bishop for gross hematuria. She has been having intermittent gross hematuria since her ablation in 2020. This has not been associated with flank or abdominal pain. She was never a smoker. She works at [employer]. She denies any history of stones. She does have baseline frequency every 1-2 hours, urge incontinence, and nocturia 2-3 times per night. PVR 96 mL. This is secondary to diabetes. Hgb A1c from 10/2020 was 6.7. She has never seen urology in the past.     CT urogram was negative for  abnormalities. Cystoscopy and pelvic showed normal anatomy    Frequent UTI  2021 - group beta strep, e.coli  2021 - group beta strep  2021 - group beta strep  2020 - e.coli    Today  Here today due to gross hematuria. She did have a negative hematuria work-up in 2021. Despite this, she does continue to have intermittent episodes of gross hematuria. We did obtain a pelvic MRI. These images were independently reviewed and show no urologic indication for the hematuria. The radiologist does make mention that there is suspicion for a cervical neoplasm on the right with node definite extension beyond the cervical stroma. The radiologist does recommend correlation with Pap smear or direct visualization. Patient states that she did have a Pap 1 year ago. The radiologist also mentions suspicion for an endometrial polyp on the right uterine horn and endometrial thickening.     Past Medical History:   Diagnosis Date    Diabetes mellitus ([address]     [place] (hard of hearing[place]     PHI HEARING AIDS    Hyperlipidemia     Hypertension     PONV (postoperative nausea and vomiting)      Past Surgical History:   Procedure Laterality Date    APPENDECTOMY  1979     SECTION  2003    DILATION AND CURETTAGE OF UTERUS N/A 2020    DILATATION AND CURETTAGE HYSTEROSCOPY CAUTERY [place] performed by Daphne Wilson MD at [facility]    Dr. Duglas Angel - vaginal wart removal    TUBAL LIGATION  2004     Outpatient Encounter Medications as of 9/14/2021   Medication Sig Dispense Refill    TRUEplus Lancets 33G MISC USE ONCE A  each 5    nitrofurantoin (MACRODANTIN) 50 MG capsule Take 1 capsule by mouth daily 90 capsule 1    amLODIPine (NORVASC) 5 MG tablet Take 1 tablet by mouth daily 30 tablet 6    metFORMIN (GLUCOPHAGE) 1000 MG tablet Take 1 tablet by mouth 2 times daily (with meals) TAKE 1 TABLET BY MOUTH TWICE DAILY WITH MEALS 60 tablet 6    lisinopril (PRINIVIL;ZESTRIL) 10 MG tablet Take 1 tablet by mouth daily 30 tablet 6    ACCU-CHEK DARRELL PLUS strip USE TO TEXT BLOOD SUGAR DAILY AS NEEDED. 50 strip 5    Blood Glucose Monitoring Suppl (ACCU-CHEK DARRELL PLUS) W/DEVICE KIT 1 kit by Does not apply route daily 1 kit 0    [DISCONTINUED] atorvastatin (LIPITOR) 40 MG tablet TAKE 1 TABLET BY MOUTH ONCE A DAY 30 tablet 5     No facility-administered encounter medications on file as of 9/14/2021. Current Outpatient Medications on File Prior to Visit   Medication Sig Dispense Refill    TRUEplus Lancets 33G MISC USE ONCE A  each 5    nitrofurantoin (MACRODANTIN) 50 MG capsule Take 1 capsule by mouth daily 90 capsule 1    amLODIPine (NORVASC) 5 MG tablet Take 1 tablet by mouth daily 30 tablet 6    metFORMIN (GLUCOPHAGE) 1000 MG tablet Take 1 tablet by mouth 2 times daily (with meals) TAKE 1 TABLET BY MOUTH TWICE DAILY WITH MEALS 60 tablet 6    lisinopril (PRINIVIL;ZESTRIL) 10 MG tablet Take 1 tablet by mouth daily 30 tablet 6    ACCU-CHEK DARRELL PLUS strip USE TO TEXT BLOOD SUGAR DAILY AS NEEDED.  50 strip 5    Blood Glucose Monitoring Suppl (ACCU-CHEK DARRELL PLUS) W/DEVICE KIT 1 kit by Does not apply route daily 1 kit 0    [DISCONTINUED] atorvastatin (LIPITOR) 40 MG tablet TAKE 1 TABLET BY MOUTH ONCE A DAY 30 tablet 5 No current facility-administered medications on file prior to visit. Cat hair extract, Dust mite extract, and Penicillins  Family History   Problem Relation Age of Onset    Heart Disease Paternal Grandfather 48         in his early 52's from heart or aneurysm    Diabetes Maternal Grandfather     Heart Attack Maternal Grandfather 80    Hypertension Father     Kidney Cancer Father 59    Lung Cancer Father 68         at age 68 from recurrent lung cancer    Heart Surgery Father     Heart Disease Father     Diabetes Mother     Hypertension Mother     Hypertension Sister     Mental Illness Brother         schizophrenia and bipolar disorder    Hypertension Brother      Social History     Tobacco Use   Smoking Status Never Smoker   Smokeless Tobacco Never Used       Social History     Substance and Sexual Activity   Alcohol Use No       Review of Systems   Constitutional: Negative for appetite change, chills and fever. Eyes: Negative for redness and visual disturbance. Respiratory: Negative for apnea, cough, shortness of breath and wheezing. Cardiovascular: Negative for chest pain and leg swelling. Gastrointestinal: Negative for abdominal pain, constipation, nausea and vomiting. Genitourinary: Negative for difficulty urinating, dyspareunia, dysuria, enuresis, flank pain, frequency, hematuria, pelvic pain, urgency, vaginal bleeding and vaginal discharge. Musculoskeletal: Negative for back pain, joint swelling and myalgias. Skin: Negative for color change, rash and wound. Neurological: Negative for dizziness, tremors and numbness. Hematological: Negative for adenopathy. Does not bruise/bleed easily. Psychiatric/Behavioral: Negative for sleep disturbance.        /82 (Site: Right Upper Arm, Position: Sitting, Cuff Size: Large Adult)   Temp 98.2 °F (36.8 °C) (Temporal)   Ht 5' 2\" (1.575 m)   Wt 216 lb (98 kg)   BMI 39.51 kg/m²       PHYSICAL EXAM:  Constitutional: Patient resting comfortably, in no acute distress. Neuro: Alert and oriented to person place and time. Cranial nerves grossly intact. Psych: Mood and affect normal.  Skin: Warm, dry  HEENT: normocephalic, atraumatic  Lymphatics: No palpable lymphadenopathy  Lungs: Respiratory effort normal, unlabored  Cardiovascular:  Normal peripheral pulses  Abdomen: Soft, non-tender, non-distended with no organomegaly or palpable masses. : No CVA tenderness. Bladder non-tender and not distended. Pelvic: Deferred    Lab Results   Component Value Date    BUN 15 05/06/2021     Lab Results   Component Value Date    CREATININE 0.54 09/10/2021       ASSESSMENT:   Diagnosis Orders   1. Cervical mass  Sandi iBanchi MD, OB/GYN, [place]   2. Endometrial polyp  Sandi Bianchi MD, OB/GYN, [place]   3. Abnormal MRI, pelvis  Sandi Bianchi MD, OB/GYN, [place]           PLAN:  We will have her see gynecology    She will continue daily suppressive Macrobid. We will plan to see her in February or sooner if needed.

## 2021-09-20 ENCOUNTER — OFFICE VISIT (OUTPATIENT)
Dept: OBGYN | Age: 51
End: 2021-09-20
Payer: COMMERCIAL

## 2021-09-20 ENCOUNTER — HOSPITAL ENCOUNTER (OUTPATIENT)
Age: 51
Setting detail: SPECIMEN
Discharge: HOME | End: 2021-09-20
Payer: COMMERCIAL

## 2021-09-20 VITALS
SYSTOLIC BLOOD PRESSURE: 126 MMHG | DIASTOLIC BLOOD PRESSURE: 74 MMHG | WEIGHT: 214 LBS | BODY MASS INDEX: 39.38 KG/M2 | HEIGHT: 62 IN

## 2021-09-20 DIAGNOSIS — N92.6 IRREGULAR BLEEDING: Primary | ICD-10-CM

## 2021-09-20 DIAGNOSIS — N92.6 IRREGULAR BLEEDING: ICD-10-CM

## 2021-09-20 PROCEDURE — 99213 OFFICE O/P EST LOW 20 MIN: CPT | Performed by: OBSTETRICS & GYNECOLOGY

## 2021-09-20 PROCEDURE — 88175 CYTOPATH C/V AUTO FLUID REDO: CPT

## 2021-09-20 PROCEDURE — 87624 HPV HI-RISK TYP POOLED RSLT: CPT

## 2021-09-20 NOTE — PROGRESS NOTES
PROBLEM VISIT     Date of service: 2021    Mariely Byrnes  Is a 45 y.o.  female    PT's PCP is: Chirag Campuzano MD     : 1970                                             Subjective:       No LMP recorded. Patient has had an ablation.    OB History    Para Term  AB Living   4 4 4     4   SAB TAB Ectopic Molar Multiple Live Births                    # Outcome Date GA Lbr Yeyo/2nd Weight Sex Delivery Anes PTL Lv   4 Term            3 Term            2 Term            1 Term                 Social History     Tobacco Use   Smoking Status Never Smoker   Smokeless Tobacco Never Used        Social History     Substance and Sexual Activity   Alcohol Use No       Allergies: Cat hair extract, Dust mite extract, and Penicillins      Current Outpatient Medications:     TRUEplus Lancets 33G MISC, USE ONCE A DAY, Disp: 100 each, Rfl: 5    nitrofurantoin (MACRODANTIN) 50 MG capsule, Take 1 capsule by mouth daily, Disp: 90 capsule, Rfl: 1    amLODIPine (NORVASC) 5 MG tablet, Take 1 tablet by mouth daily, Disp: 30 tablet, Rfl: 6    metFORMIN (GLUCOPHAGE) 1000 MG tablet, Take 1 tablet by mouth 2 times daily (with meals) TAKE 1 TABLET BY MOUTH TWICE DAILY WITH MEALS, Disp: 60 tablet, Rfl: 6    lisinopril (PRINIVIL;ZESTRIL) 10 MG tablet, Take 1 tablet by mouth daily, Disp: 30 tablet, Rfl: 6    ACCU-CHEK DARRELL PLUS strip, USE TO TEXT BLOOD SUGAR DAILY AS NEEDED., Disp: 50 strip, Rfl: 5    Blood Glucose Monitoring Suppl (ACCU-CHEK DARRELL PLUS) W/DEVICE KIT, 1 kit by Does not apply route daily, Disp: 1 kit, Rfl: 0    Social History     Substance and Sexual Activity   Sexual Activity Not Currently    Partners: Male       Last Yearly:  2020    Last pap: 2020    Last HPV: unknown    Have you had a positive covid test: No    Have you had the covid immunization: Yes    Chief Complaint   Patient presents with    Vaginal Bleeding     C/O bleeding off and on , patient saw urologist and was found to have mass on cervix. PE:  Vital Signs  Blood pressure 126/74, height 5' 2\" (1.575 m), weight 214 lb (97.1 kg), not currently breastfeeding. Estimated body mass index is 39.14 kg/m² as calculated from the following:    Height as of this encounter: 5' 2\" (1.575 m). Weight as of this encounter: 214 lb (97.1 kg). No data recorded    NURSE: Vlad TAYLOR    HPI: The patient presented today to review her recent MRI. The patient had very heavy and irregular bleeding which led to her having a hysteroscopy dilatation curettage and NovaSure ablation in December of last year. Prior to this her Pap smears have all been normal as well. She has had some occasional lighter bleeding after the ablation but nothing compared to the heaviness she experienced prior. She had experienced some gross hematuria and a pelvic MRI was performed. No PT denies fever, chills, nausea and vomiting       Objective  Lymphatic:   no lymphadenopathy   GI: Abdomen soft, non-tender. BS normal. No masses,  No organomegaly, old  section scar. Obesity noted            Pelvic Exam: GENITAL/URINARY:  External Genitalia:  General appearance; normal, Hair distribution; normal, Lesions absent  Vagina:  General appearance normal, Estrogen effect normal, Discharge absent, Lesions absent, Pelvic support normal  Cervix:  General appearance normal, Lesions absent, Discharge absent, Tenderness absent, Enlargement absent, Nodularity absent, no palpable abnormality of cervix is noted  Uterus:  Size normal, Contour normal, Position normal, Masses absent, Consistency; normal, Support normal, Tenderness absent  Adenexa: Masses absent, Tenderness absent, Enlargement absent, Nodularity absent                                    Vaginal discharge: no vaginal discharge                        Results reviewed today:    No results found for this visit on 21. Assessment/plan: MRI report reviewed.   Patient and her spouse are concerned about the findings of a possible cervical mass. Pap smear was repeated today. The patient and her spouse are considering having a hysterectomy.   I also did offer for them to have an a second opinion with GYN oncologist.  Will await Pap smear results

## 2021-09-28 LAB — CYTOLOGY REPORT: NORMAL

## 2021-10-01 LAB
HPV SAMPLE: NORMAL
HPV, GENOTYPE 16: NOT DETECTED
HPV, GENOTYPE 18: NOT DETECTED
HPV, HIGH RISK OTHER: NOT DETECTED
HPV, INTERPRETATION: NORMAL
SPECIMEN DESCRIPTION: NORMAL

## 2021-10-05 ENCOUNTER — TELEPHONE (OUTPATIENT)
Dept: OBGYN | Age: 51
End: 2021-10-05

## 2021-10-05 NOTE — TELEPHONE ENCOUNTER
Patient called and has been seeing Dr Melba Goldmann, has a mass on cervix. She has decided she would like the robotic Hyst.  Schedule for appointment for consult?

## 2021-10-05 NOTE — TELEPHONE ENCOUNTER
Can you please review her notes/imaging and confirm that we can schedule her for a RA TLH? See result note concerning this

## 2021-11-02 ENCOUNTER — OFFICE VISIT (OUTPATIENT)
Dept: OBGYN | Age: 51
End: 2021-11-02
Payer: COMMERCIAL

## 2021-11-02 VITALS
HEIGHT: 62 IN | SYSTOLIC BLOOD PRESSURE: 136 MMHG | WEIGHT: 217 LBS | DIASTOLIC BLOOD PRESSURE: 84 MMHG | BODY MASS INDEX: 39.93 KG/M2

## 2021-11-02 DIAGNOSIS — N84.0 ENDOMETRIAL POLYP: ICD-10-CM

## 2021-11-02 DIAGNOSIS — N80.03 ADENOMYOSIS: Primary | ICD-10-CM

## 2021-11-02 PROCEDURE — 99213 OFFICE O/P EST LOW 20 MIN: CPT | Performed by: OBSTETRICS & GYNECOLOGY

## 2021-11-02 NOTE — PROGRESS NOTES
DATE OF VISIT:  11/2/21    PATIENT NAME:  Gibran Raymundo OF BIRTH: 1970    REASON FOR VISIT:    Chief Complaint   Patient presents with    Vaginal Bleeding     Patient is being seen to discuss surgical options. She notes that she had an ablation last Dec with Dr Jaxson Newby but has continued to have problems. She would like to discuss [facility]. Patient had an MRI with multiple abnormal findings in Sept.         HISTORY OF PRESENT ILLNESS:  Pt had ablation last dec for heavy menses; states that now she has multiple abnl findings on an MRI that was done by urology after she had recurrent utis and hematuria; usn prior to ablation showed adenomyosis; MRI shows adenomyosis, endometrial polyp and cervical mass ?fibroid; she had nl pap with dr Queta Griffin this year; pt wants hysterectomy but wants it done laparoscopically so she can return to work sooner; pt thinks that she wants bso even thought labs last year showed that she wasn't menopausal      No LMP recorded. Patient has had an ablation. Vitals:    11/02/21 1303   BP: 136/84   Weight: 217 lb (98.4 kg)   Height: 5' 2\" (1.575 m)     Body mass index is 39.69 kg/m². Allergies   Allergen Reactions    Cat Hair Extract     Dust Mite Extract     Penicillins Hives     Current Outpatient Medications   Medication Sig Dispense Refill    TRUEplus Lancets 33G MISC USE ONCE A  each 5    nitrofurantoin (MACRODANTIN) 50 MG capsule Take 1 capsule by mouth daily 90 capsule 1    amLODIPine (NORVASC) 5 MG tablet Take 1 tablet by mouth daily 30 tablet 6    metFORMIN (GLUCOPHAGE) 1000 MG tablet Take 1 tablet by mouth 2 times daily (with meals) TAKE 1 TABLET BY MOUTH TWICE DAILY WITH MEALS 60 tablet 6    lisinopril (PRINIVIL;ZESTRIL) 10 MG tablet Take 1 tablet by mouth daily 30 tablet 6    ACCU-CHEK DARRELL PLUS strip USE TO TEXT BLOOD SUGAR DAILY AS NEEDED.  50 strip 5    Blood Glucose Monitoring Suppl (ACCU-CHEK DARRELL PLUS) W/DEVICE KIT 1 kit by Does not apply route daily 1 kit 0     No current facility-administered medications for this visit. Social History     Socioeconomic History    Marital status:      Spouse name: None    Number of children: None    Years of education: None    Highest education level: None   Occupational History    None   Tobacco Use    Smoking status: Never Smoker    Smokeless tobacco: Never Used   Vaping Use    Vaping Use: Never used   Substance and Sexual Activity    Alcohol use: No    Drug use: No    Sexual activity: Not Currently     Partners: Male   Other Topics Concern    None   Social History Narrative    None     Social Determinants of Health     Financial Resource Strain:     Difficulty of Paying Living Expenses:    Food Insecurity:     Worried About Running Out of Food in the Last Year:     Ran Out of Food in the Last Year:    Transportation Needs:     Lack of Transportation (Medical):  Lack of Transportation (Non-Medical):    Physical Activity:     Days of Exercise per Week:     Minutes of Exercise per Session:    Stress:     Feeling of Stress :    Social Connections:     Frequency of Communication with Friends and Family:     Frequency of Social Gatherings with Friends and Family:     Attends Congregation Services:     Active Member of Clubs or Organizations:     Attends Club or Organization Meetings:     Marital Status:    Intimate Partner Violence:     Fear of Current or Ex-Partner:     Emotionally Abused:     Physically Abused:     Sexually Abused:        REVIEW OF SYSTEMS:  Review of Systems   Constitutional: Negative for chills and fever. Genitourinary: Positive for pelvic pain (some). Negative for dysuria and menstrual problem (not heavy like prior to ablation but irreg). PHYSICAL EXAM:  /84   Ht 5' 2\" (1.575 m)   Wt 217 lb (98.4 kg)   BMI 39.69 kg/m²   Physical Exam  Constitutional:       Appearance: Normal appearance. HENT:      Head: Normocephalic and atraumatic.    Eyes: Extraocular Movements: Extraocular movements intact. Pupils: Pupils are equal, round, and reactive to light. Cardiovascular:      Rate and Rhythm: Normal rate. Pulmonary:      Effort: Pulmonary effort is normal.   Neurological:      Mental Status: She is alert and oriented to person, place, and time. Skin:     General: Skin is warm and dry. Psychiatric:         Mood and Affect: Mood normal.         Behavior: Behavior normal.         Thought Content: Thought content normal.         Judgment: Judgment normal.       The patient, Justa Uribe is a 45 y.o. female, was seen with a total time spent of 20 minutes for the visit on this date of service by the E/M provider. The time component had both face to face and non face to face time spent in determining the total time component. Counseling and education regarding her diagnosis listed below and her options regarding those diagnoses were also included in determining her time component. Diagnosis Orders   1. Adenomyosis     2. Endometrial polyp          The patient had her preventative health maintenance recommendations and follow-up reviewed with her at the completion of her visit. ASSESSMENT:      1. Adenomyosis    2. Endometrial polyp        PLAN:  No orders of the defined types were placed in this encounter. Return for  to coordinate.        Electronically signed by Mariana Gale DO on 11/02/21

## 2021-11-06 ENCOUNTER — HOSPITAL ENCOUNTER (OUTPATIENT)
Age: 51
Discharge: HOME | End: 2021-11-06
Payer: COMMERCIAL

## 2021-11-06 DIAGNOSIS — E11.65 UNCONTROLLED TYPE 2 DIABETES MELLITUS WITH HYPERGLYCEMIA (HCC): ICD-10-CM

## 2021-11-06 LAB
ALT SERPL-CCNC: 141 U/L (ref 5–33)
ANION GAP SERPL CALCULATED.3IONS-SCNC: 13 MMOL/L (ref 9–17)
AST SERPL-CCNC: 111 U/L
BUN BLDV-MCNC: 16 MG/DL (ref 6–20)
BUN/CREAT BLD: 33 (ref 9–20)
CALCIUM SERPL-MCNC: 9.1 MG/DL (ref 8.6–10.4)
CHLORIDE BLD-SCNC: 101 MMOL/L (ref 98–107)
CHOLESTEROL/HDL RATIO: 3.4
CHOLESTEROL: 240 MG/DL
CO2: 22 MMOL/L (ref 20–31)
CREAT SERPL-MCNC: 0.48 MG/DL (ref 0.5–0.9)
CREATININE URINE: 132.1 MG/DL (ref 28–217)
GFR AFRICAN AMERICAN: >60 ML/MIN
GFR NON-AFRICAN AMERICAN: >60 ML/MIN
GFR SERPL CREATININE-BSD FRML MDRD: ABNORMAL ML/MIN/{1.73_M2}
GFR SERPL CREATININE-BSD FRML MDRD: ABNORMAL ML/MIN/{1.73_M2}
GLUCOSE BLD-MCNC: 205 MG/DL (ref 70–99)
HCT VFR BLD CALC: 42.3 % (ref 36.3–47.1)
HDLC SERPL-MCNC: 70 MG/DL
HEMOGLOBIN: 13.2 G/DL (ref 11.9–15.1)
LDL CHOLESTEROL: 137 MG/DL (ref 0–130)
MCH RBC QN AUTO: 28.2 PG (ref 25.2–33.5)
MCHC RBC AUTO-ENTMCNC: 31.2 G/DL (ref 28.4–34.8)
MCV RBC AUTO: 90.4 FL (ref 82.6–102.9)
MICROALBUMIN/CREAT 24H UR: <12 MG/L
MICROALBUMIN/CREAT UR-RTO: NORMAL MCG/MG CREAT
NRBC AUTOMATED: 0 PER 100 WBC
PDW BLD-RTO: 13.2 % (ref 11.8–14.4)
PLATELET # BLD: 307 K/UL (ref 138–453)
PMV BLD AUTO: 9.8 FL (ref 8.1–13.5)
POTASSIUM SERPL-SCNC: 4.3 MMOL/L (ref 3.7–5.3)
RBC # BLD: 4.68 M/UL (ref 3.95–5.11)
SODIUM BLD-SCNC: 136 MMOL/L (ref 135–144)
TRIGL SERPL-MCNC: 166 MG/DL
VLDLC SERPL CALC-MCNC: ABNORMAL MG/DL (ref 1–30)
WBC # BLD: 10.3 K/UL (ref 3.5–11.3)

## 2021-11-06 PROCEDURE — 36415 COLL VENOUS BLD VENIPUNCTURE: CPT

## 2021-11-06 PROCEDURE — 85027 COMPLETE CBC AUTOMATED: CPT

## 2021-11-06 PROCEDURE — 84450 TRANSFERASE (AST) (SGOT): CPT

## 2021-11-06 PROCEDURE — 80061 LIPID PANEL: CPT

## 2021-11-06 PROCEDURE — 83036 HEMOGLOBIN GLYCOSYLATED A1C: CPT

## 2021-11-06 PROCEDURE — 82043 UR ALBUMIN QUANTITATIVE: CPT

## 2021-11-06 PROCEDURE — 84460 ALANINE AMINO (ALT) (SGPT): CPT

## 2021-11-06 PROCEDURE — 80048 BASIC METABOLIC PNL TOTAL CA: CPT

## 2021-11-06 PROCEDURE — 82570 ASSAY OF URINE CREATININE: CPT

## 2021-11-07 LAB
ESTIMATED AVERAGE GLUCOSE: 180 MG/DL
HBA1C MFR BLD: 7.9 % (ref 4–6)

## 2021-11-12 NOTE — PROGRESS NOTES
I called patient to discuss surgery expectations and recovery. She is scheduled for a RA TLROMI/BSO, swati Marie at [facility] on 1/31/2022. She is aware that a nurse from [facility] will call her to complete a phone interview. She is aware that under current guidelines, she will not need COVID testing as she is vaccinated. 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 Patient will come in to see Dr. Tyler Iverson prior to surgery and will get labs at that visit. We will also follow up weeks after surgery. Appointments scheduled. Patient verbalized understanding, no further questions/concerns voiced.

## 2021-11-18 ENCOUNTER — HOSPITAL ENCOUNTER (OUTPATIENT)
Age: 51
Discharge: HOME | End: 2021-11-18
Payer: COMMERCIAL

## 2021-11-18 DIAGNOSIS — R79.89 ELEVATED LFTS: ICD-10-CM

## 2021-11-18 LAB
ALBUMIN SERPL-MCNC: 4.5 G/DL (ref 3.5–5.2)
ALBUMIN/GLOBULIN RATIO: 1.3 (ref 1–2.5)
ALP BLD-CCNC: 110 U/L (ref 35–104)
ALT SERPL-CCNC: 143 U/L (ref 5–33)
ANION GAP SERPL CALCULATED.3IONS-SCNC: 15 MMOL/L (ref 9–17)
AST SERPL-CCNC: 107 U/L
BILIRUB SERPL-MCNC: 0.41 MG/DL (ref 0.3–1.2)
BUN BLDV-MCNC: 11 MG/DL (ref 6–20)
BUN/CREAT BLD: 26 (ref 9–20)
CALCIUM SERPL-MCNC: 9.4 MG/DL (ref 8.6–10.4)
CHLORIDE BLD-SCNC: 98 MMOL/L (ref 98–107)
CO2: 23 MMOL/L (ref 20–31)
CREAT SERPL-MCNC: 0.42 MG/DL (ref 0.5–0.9)
GFR AFRICAN AMERICAN: >60 ML/MIN
GFR NON-AFRICAN AMERICAN: >60 ML/MIN
GFR SERPL CREATININE-BSD FRML MDRD: ABNORMAL ML/MIN/{1.73_M2}
GFR SERPL CREATININE-BSD FRML MDRD: ABNORMAL ML/MIN/{1.73_M2}
GLUCOSE BLD-MCNC: 219 MG/DL (ref 70–99)
HBV SURFACE AB TITR SER: <3.5 MIU/ML
HEPATITIS B CORE IGM ANTIBODY: NONREACTIVE
HEPATITIS B SURFACE ANTIGEN: NONREACTIVE
HEPATITIS C ANTIBODY: NONREACTIVE
POTASSIUM SERPL-SCNC: 3.7 MMOL/L (ref 3.7–5.3)
SODIUM BLD-SCNC: 136 MMOL/L (ref 135–144)
TOTAL PROTEIN: 8 G/DL (ref 6.4–8.3)

## 2021-11-18 PROCEDURE — 80053 COMPREHEN METABOLIC PANEL: CPT

## 2021-11-18 PROCEDURE — 87340 HEPATITIS B SURFACE AG IA: CPT

## 2021-11-18 PROCEDURE — 86317 IMMUNOASSAY INFECTIOUS AGENT: CPT

## 2021-11-18 PROCEDURE — 86803 HEPATITIS C AB TEST: CPT

## 2021-11-18 PROCEDURE — 36415 COLL VENOUS BLD VENIPUNCTURE: CPT

## 2021-11-18 PROCEDURE — 86705 HEP B CORE ANTIBODY IGM: CPT

## 2021-11-22 DIAGNOSIS — N92.6 IRREGULAR BLEEDING: ICD-10-CM

## 2021-11-22 DIAGNOSIS — N80.03 ADENOMYOSIS: ICD-10-CM

## 2021-11-22 DIAGNOSIS — I10 ESSENTIAL HYPERTENSION, BENIGN: ICD-10-CM

## 2021-11-22 DIAGNOSIS — Z01.818 PRE-OP TESTING: ICD-10-CM

## 2021-11-22 DIAGNOSIS — E11.9 TYPE 2 DIABETES MELLITUS NOT AT GOAL (HCC): Primary | ICD-10-CM

## 2021-11-26 ENCOUNTER — HOSPITAL ENCOUNTER (OUTPATIENT)
Dept: ULTRASOUND IMAGING | Age: 51
Discharge: HOME | End: 2021-11-28
Payer: COMMERCIAL

## 2021-11-26 DIAGNOSIS — R79.89 ELEVATED LFTS: ICD-10-CM

## 2021-11-26 PROCEDURE — 76705 ECHO EXAM OF ABDOMEN: CPT

## 2022-01-18 NOTE — PROGRESS NOTES
Patient instructed on the pre-operative, intra-operative, and post-operative process. Patient instructed on NPO status. Medication instructions and pre operative instructions reviewed with patient over the phone. Patient will take Norvasc morning of surgery with sip of water. Dr. Gwenlyn Merlin office instructed patient to hold Glucophage 24 hours prior to surgery. G skin prep instructions reviewed with patient. Covid vaccination documented in Epic.

## 2022-01-19 ENCOUNTER — TELEPHONE (OUTPATIENT)
Dept: OBGYN CLINIC | Age: 52
End: 2022-01-19

## 2022-01-19 NOTE — TELEPHONE ENCOUNTER
Spoke to Tyshawn at [place] regarding patient's upcoming procedure (30049). The procedure does not require a PA. Reference # is [id number].

## 2022-01-25 ENCOUNTER — HOSPITAL ENCOUNTER (OUTPATIENT)
Age: 52
Discharge: HOME | End: 2022-01-25
Payer: COMMERCIAL

## 2022-01-25 ENCOUNTER — OFFICE VISIT (OUTPATIENT)
Dept: OBGYN | Age: 52
End: 2022-01-25
Payer: COMMERCIAL

## 2022-01-25 VITALS
HEIGHT: 62 IN | WEIGHT: 216 LBS | SYSTOLIC BLOOD PRESSURE: 138 MMHG | DIASTOLIC BLOOD PRESSURE: 84 MMHG | BODY MASS INDEX: 39.75 KG/M2

## 2022-01-25 DIAGNOSIS — Z01.818 ENCOUNTER FOR PREOPERATIVE ASSESSMENT: ICD-10-CM

## 2022-01-25 DIAGNOSIS — D25.9 UTERINE LEIOMYOMA, UNSPECIFIED LOCATION: ICD-10-CM

## 2022-01-25 DIAGNOSIS — N92.6 IRREGULAR BLEEDING: ICD-10-CM

## 2022-01-25 DIAGNOSIS — N84.0 ENDOMETRIAL POLYP: ICD-10-CM

## 2022-01-25 DIAGNOSIS — N80.03 ADENOMYOSIS: ICD-10-CM

## 2022-01-25 DIAGNOSIS — N80.03 ADENOMYOSIS: Primary | ICD-10-CM

## 2022-01-25 DIAGNOSIS — I10 ESSENTIAL HYPERTENSION, BENIGN: ICD-10-CM

## 2022-01-25 DIAGNOSIS — Z01.818 PRE-OP TESTING: ICD-10-CM

## 2022-01-25 DIAGNOSIS — E11.9 TYPE 2 DIABETES MELLITUS NOT AT GOAL (HCC): ICD-10-CM

## 2022-01-25 LAB
ABSOLUTE EOS #: 0.33 K/UL (ref 0–0.44)
ABSOLUTE IMMATURE GRANULOCYTE: 0.05 K/UL (ref 0–0.3)
ABSOLUTE LYMPH #: 3 K/UL (ref 1.1–3.7)
ABSOLUTE MONO #: 0.99 K/UL (ref 0.1–1.2)
ALBUMIN SERPL-MCNC: 4.4 G/DL (ref 3.5–5.2)
ALBUMIN/GLOBULIN RATIO: 1.5 (ref 1–2.5)
ALP BLD-CCNC: 107 U/L (ref 35–104)
ALT SERPL-CCNC: 133 U/L (ref 5–33)
ANION GAP SERPL CALCULATED.3IONS-SCNC: 16 MMOL/L (ref 9–17)
AST SERPL-CCNC: 90 U/L
BASOPHILS # BLD: 1 % (ref 0–2)
BASOPHILS ABSOLUTE: 0.1 K/UL (ref 0–0.2)
BILIRUB SERPL-MCNC: 0.48 MG/DL (ref 0.3–1.2)
BUN BLDV-MCNC: 16 MG/DL (ref 6–20)
BUN/CREAT BLD: 27 (ref 9–20)
CALCIUM SERPL-MCNC: 10 MG/DL (ref 8.6–10.4)
CHLORIDE BLD-SCNC: 98 MMOL/L (ref 98–107)
CO2: 25 MMOL/L (ref 20–31)
CREAT SERPL-MCNC: 0.59 MG/DL (ref 0.5–0.9)
DIFFERENTIAL TYPE: ABNORMAL
EKG ATRIAL RATE: 82 BPM
EKG P AXIS: 52 DEGREES
EKG P-R INTERVAL: 144 MS
EKG Q-T INTERVAL: 384 MS
EKG QRS DURATION: 92 MS
EKG QTC CALCULATION (BAZETT): 448 MS
EKG R AXIS: -6 DEGREES
EKG T AXIS: 58 DEGREES
EKG VENTRICULAR RATE: 82 BPM
EOSINOPHILS RELATIVE PERCENT: 2 % (ref 1–4)
GFR AFRICAN AMERICAN: >60 ML/MIN
GFR NON-AFRICAN AMERICAN: >60 ML/MIN
GFR SERPL CREATININE-BSD FRML MDRD: ABNORMAL ML/MIN/{1.73_M2}
GFR SERPL CREATININE-BSD FRML MDRD: ABNORMAL ML/MIN/{1.73_M2}
GLUCOSE BLD-MCNC: 235 MG/DL (ref 70–99)
HCT VFR BLD CALC: 40 % (ref 36.3–47.1)
HEMOGLOBIN: 12.9 G/DL (ref 11.9–15.1)
IMMATURE GRANULOCYTES: 0 %
LYMPHOCYTES # BLD: 22 % (ref 24–43)
MCH RBC QN AUTO: 28.4 PG (ref 25.2–33.5)
MCHC RBC AUTO-ENTMCNC: 32.3 G/DL (ref 28.4–34.8)
MCV RBC AUTO: 88.1 FL (ref 82.6–102.9)
MONOCYTES # BLD: 7 % (ref 3–12)
NRBC AUTOMATED: 0 PER 100 WBC
PDW BLD-RTO: 12.9 % (ref 11.8–14.4)
PLATELET # BLD: 315 K/UL (ref 138–453)
PLATELET ESTIMATE: ABNORMAL
PMV BLD AUTO: 9.9 FL (ref 8.1–13.5)
POTASSIUM SERPL-SCNC: 4.4 MMOL/L (ref 3.7–5.3)
RBC # BLD: 4.54 M/UL (ref 3.95–5.11)
RBC # BLD: ABNORMAL 10*6/UL
SEG NEUTROPHILS: 68 % (ref 36–65)
SEGMENTED NEUTROPHILS ABSOLUTE COUNT: 9.16 K/UL (ref 1.5–8.1)
SODIUM BLD-SCNC: 139 MMOL/L (ref 135–144)
TOTAL PROTEIN: 7.4 G/DL (ref 6.4–8.3)
WBC # BLD: 13.6 K/UL (ref 3.5–11.3)
WBC # BLD: ABNORMAL 10*3/UL

## 2022-01-25 PROCEDURE — 83036 HEMOGLOBIN GLYCOSYLATED A1C: CPT

## 2022-01-25 PROCEDURE — 80053 COMPREHEN METABOLIC PANEL: CPT

## 2022-01-25 PROCEDURE — 99213 OFFICE O/P EST LOW 20 MIN: CPT | Performed by: OBSTETRICS & GYNECOLOGY

## 2022-01-25 PROCEDURE — 93005 ELECTROCARDIOGRAM TRACING: CPT

## 2022-01-25 PROCEDURE — 36415 COLL VENOUS BLD VENIPUNCTURE: CPT

## 2022-01-25 PROCEDURE — 93010 ELECTROCARDIOGRAM REPORT: CPT | Performed by: INTERNAL MEDICINE

## 2022-01-25 PROCEDURE — 85025 COMPLETE CBC W/AUTO DIFF WBC: CPT

## 2022-01-25 NOTE — PROGRESS NOTES
DATE OF VISIT:  1/25/22    PATIENT NAME:  Simeon Brittle OF BIRTH: 1970    REASON FOR VISIT:    Chief Complaint   Patient presents with    Follow-up     Patient is being seen pre-op, TLH/BSO on 1/31/2022. HISTORY OF PRESENT ILLNESS:  Pt had ablation last dec for heavy menses; states that now she has multiple abnl findings on an MRI that was done by urology after she had recurrent utis and hematuria; usn prior to ablation showed adenomyosis; MRI shows adenomyosis, endometrial polyp and cervical mass ?fibroid; she had nl pap with dr Yamile Vidal this year; pt wants hysterectomy but wants it done laparoscopically; disc'd ra tlh/bso; r/b/a reviewed; restriction and recovery disc'd      Patient's last menstrual period was 12/27/2021. Vitals:    01/25/22 1404   BP: 138/84   Weight: 216 lb (98 kg)   Height: 5' 2\" (1.575 m)     Body mass index is 39.51 kg/m². Allergies   Allergen Reactions    Cat Hair Extract     Dust Mite Extract     Penicillins Hives     Current Outpatient Medications   Medication Sig Dispense Refill    metFORMIN (GLUCOPHAGE) 1000 MG tablet Take 1 tablet by mouth 2 times daily (with meals) 60 tablet 6    SITagliptin (JANUVIA) 100 MG tablet Take 1 tablet by mouth daily 30 tablet 6    lisinopril (PRINIVIL;ZESTRIL) 10 MG tablet Take 1 tablet by mouth daily 30 tablet 6    TRUEplus Lancets 33G MISC USE ONCE A  each 5    nitrofurantoin (MACRODANTIN) 50 MG capsule Take 1 capsule by mouth daily 90 capsule 1    amLODIPine (NORVASC) 5 MG tablet Take 1 tablet by mouth daily 30 tablet 6    ACCU-CHEK DARRELL PLUS strip USE TO TEXT BLOOD SUGAR DAILY AS NEEDED. (Patient not taking: Reported on 1/25/2022) 50 strip 5    Blood Glucose Monitoring Suppl (ACCU-CHEK DARRELL PLUS) W/DEVICE KIT 1 kit by Does not apply route daily (Patient not taking: Reported on 1/25/2022) 1 kit 0     No current facility-administered medications for this visit.      Social History     Socioeconomic History    Marital status:      Spouse name: None    Number of children: None    Years of education: None    Highest education level: None   Occupational History    None   Tobacco Use    Smoking status: Never Smoker    Smokeless tobacco: Never Used   Vaping Use    Vaping Use: Never used   Substance and Sexual Activity    Alcohol use: No    Drug use: No    Sexual activity: Not Currently     Partners: Male   Other Topics Concern    None   Social History Narrative    None     Social Determinants of Health     Financial Resource Strain: Low Risk     Difficulty of Paying Living Expenses: Not hard at all   Food Insecurity: No Food Insecurity    Worried About Running Out of Food in the Last Year: Never true    Krystal of Food in the Last Year: Never true   Transportation Needs:     Lack of Transportation (Medical): Not on file    Lack of Transportation (Non-Medical): Not on file   Physical Activity:     Days of Exercise per Week: Not on file    Minutes of Exercise per Session: Not on file   Stress:     Feeling of Stress : Not on file   Social Connections:     Frequency of Communication with Friends and Family: Not on file    Frequency of Social Gatherings with Friends and Family: Not on file    Attends Jain Services: Not on file    Active Member of [address] [place] [address] or Organizations: Not on file    Attends Club or Organization Meetings: Not on file    Marital Status: Not on file   Intimate Partner Violence:     Fear of Current or Ex-Partner: Not on file    Emotionally Abused: Not on file    Physically Abused: Not on file    Sexually Abused: Not on file   Housing Stability:     Unable to Pay for Housing in the Last Year: Not on file    Number of [place] in the Last Year: Not on file    Unstable Housing in the Last Year: Not on file       REVIEW OF SYSTEMS:  Review of Systems   Constitutional: Negative for chills and fever. Genitourinary: Positive for menstrual problem and pelvic pain.  Negative for dysuria and vaginal discharge. PHYSICAL EXAM:  /84   Ht 5' 2\" (1.575 m)   Wt 216 lb (98 kg)   LMP 12/27/2021   BMI 39.51 kg/m²   Physical Exam  Constitutional:       Appearance: Normal appearance. HENT:      Head: Normocephalic and atraumatic. Eyes:      Extraocular Movements: Extraocular movements intact. Pupils: Pupils are equal, round, and reactive to light. Cardiovascular:      Rate and Rhythm: Normal rate. Pulmonary:      Effort: Pulmonary effort is normal.   Musculoskeletal:         General: Normal range of motion. Neurological:      Mental Status: She is alert and oriented to person, place, and time. Skin:     General: Skin is warm and dry. Psychiatric:         Mood and Affect: Mood normal.         Thought Content: Thought content normal.         Judgment: Judgment normal.       The patient, Moncho Carballo is a 45 y.o. female, was seen with a total time spent of 20 minutes for the visit on this date of service by the E/M provider. The time component had both face to face and non face to face time spent in determining the total time component. Counseling and education regarding her diagnosis listed below and her options regarding those diagnoses were also included in determining her time component. Diagnosis Orders   1. Adenomyosis     2. Endometrial polyp     3. Uterine leiomyoma, unspecified location          The patient had her preventative health maintenance recommendations and follow-up reviewed with her at the completion of her visit. ASSESSMENT:      1. Adenomyosis    2. Endometrial polyp    3. Uterine leiomyoma, unspecified location        PLAN:  No orders of the defined types were placed in this encounter. Return in about 1 week (around 2/1/2022) for SHAKEEL DE LUNA/BSO.        Electronically signed by Blanco Dong DO on 01/25/22

## 2022-01-26 LAB
ESTIMATED AVERAGE GLUCOSE: 206 MG/DL
HBA1C MFR BLD: 8.8 % (ref 4–6)

## 2022-01-28 ENCOUNTER — ANESTHESIA EVENT (OUTPATIENT)
Dept: OPERATING ROOM | Age: 52
End: 2022-01-28
Payer: COMMERCIAL

## 2022-01-31 ENCOUNTER — ANESTHESIA (OUTPATIENT)
Dept: OPERATING ROOM | Age: 52
End: 2022-01-31
Payer: COMMERCIAL

## 2022-01-31 ENCOUNTER — HOSPITAL ENCOUNTER (OUTPATIENT)
Age: 52
Setting detail: OUTPATIENT SURGERY
Discharge: HOME | End: 2022-01-31
Attending: OBSTETRICS & GYNECOLOGY | Admitting: OBSTETRICS & GYNECOLOGY
Payer: COMMERCIAL

## 2022-01-31 VITALS
BODY MASS INDEX: 39.31 KG/M2 | DIASTOLIC BLOOD PRESSURE: 62 MMHG | RESPIRATION RATE: 16 BRPM | SYSTOLIC BLOOD PRESSURE: 124 MMHG | TEMPERATURE: 98.1 F | HEIGHT: 62 IN | WEIGHT: 213.6 LBS | OXYGEN SATURATION: 96 % | HEART RATE: 70 BPM

## 2022-01-31 VITALS — OXYGEN SATURATION: 95 % | SYSTOLIC BLOOD PRESSURE: 98 MMHG | DIASTOLIC BLOOD PRESSURE: 50 MMHG

## 2022-01-31 DIAGNOSIS — G89.18 POST-OP PAIN: Primary | ICD-10-CM

## 2022-01-31 LAB
GLUCOSE BLD-MCNC: 229 MG/DL (ref 74–100)
GLUCOSE BLD-MCNC: 229 MG/DL (ref 74–100)

## 2022-01-31 PROCEDURE — 64488 TAP BLOCK BI INJECTION: CPT | Performed by: NURSE ANESTHETIST, CERTIFIED REGISTERED

## 2022-01-31 PROCEDURE — 58571 TLH W/T/O 250 G OR LESS: CPT

## 2022-01-31 PROCEDURE — 3700000000 HC ANESTHESIA ATTENDED CARE: Performed by: OBSTETRICS & GYNECOLOGY

## 2022-01-31 PROCEDURE — 2500000003 HC RX 250 WO HCPCS: Performed by: NURSE ANESTHETIST, CERTIFIED REGISTERED

## 2022-01-31 PROCEDURE — 6360000002 HC RX W HCPCS

## 2022-01-31 PROCEDURE — 3600000009 HC SURGERY ROBOT BASE: Performed by: OBSTETRICS & GYNECOLOGY

## 2022-01-31 PROCEDURE — 6360000002 HC RX W HCPCS: Performed by: NURSE ANESTHETIST, CERTIFIED REGISTERED

## 2022-01-31 PROCEDURE — 7100000000 HC PACU RECOVERY - FIRST 15 MIN: Performed by: OBSTETRICS & GYNECOLOGY

## 2022-01-31 PROCEDURE — 6370000000 HC RX 637 (ALT 250 FOR IP): Performed by: NURSE ANESTHETIST, CERTIFIED REGISTERED

## 2022-01-31 PROCEDURE — 2580000003 HC RX 258: Performed by: NURSE ANESTHETIST, CERTIFIED REGISTERED

## 2022-01-31 PROCEDURE — 3700000001 HC ADD 15 MINUTES (ANESTHESIA): Performed by: OBSTETRICS & GYNECOLOGY

## 2022-01-31 PROCEDURE — S2900 ROBOTIC SURGICAL SYSTEM: HCPCS | Performed by: OBSTETRICS & GYNECOLOGY

## 2022-01-31 PROCEDURE — 58571 TLH W/T/O 250 G OR LESS: CPT | Performed by: OBSTETRICS & GYNECOLOGY

## 2022-01-31 PROCEDURE — 7100000010 HC PHASE II RECOVERY - FIRST 15 MIN: Performed by: OBSTETRICS & GYNECOLOGY

## 2022-01-31 PROCEDURE — 2709999900 HC NON-CHARGEABLE SUPPLY: Performed by: OBSTETRICS & GYNECOLOGY

## 2022-01-31 PROCEDURE — 2720000010 HC SURG SUPPLY STERILE: Performed by: OBSTETRICS & GYNECOLOGY

## 2022-01-31 PROCEDURE — 6370000000 HC RX 637 (ALT 250 FOR IP): Performed by: OBSTETRICS & GYNECOLOGY

## 2022-01-31 PROCEDURE — 82947 ASSAY GLUCOSE BLOOD QUANT: CPT

## 2022-01-31 PROCEDURE — 7100000011 HC PHASE II RECOVERY - ADDTL 15 MIN: Performed by: OBSTETRICS & GYNECOLOGY

## 2022-01-31 PROCEDURE — A4216 STERILE WATER/SALINE, 10 ML: HCPCS | Performed by: NURSE ANESTHETIST, CERTIFIED REGISTERED

## 2022-01-31 PROCEDURE — 7100000001 HC PACU RECOVERY - ADDTL 15 MIN: Performed by: OBSTETRICS & GYNECOLOGY

## 2022-01-31 PROCEDURE — 88307 TISSUE EXAM BY PATHOLOGIST: CPT

## 2022-01-31 PROCEDURE — 3600000019 HC SURGERY ROBOT ADDTL 15MIN: Performed by: OBSTETRICS & GYNECOLOGY

## 2022-01-31 RX ORDER — ONDANSETRON 2 MG/ML
INJECTION INTRAMUSCULAR; INTRAVENOUS PRN
Status: DISCONTINUED | OUTPATIENT
Start: 2022-01-31 | End: 2022-01-31 | Stop reason: SDUPTHER

## 2022-01-31 RX ORDER — FENTANYL CITRATE 50 UG/ML
INJECTION, SOLUTION INTRAMUSCULAR; INTRAVENOUS PRN
Status: DISCONTINUED | OUTPATIENT
Start: 2022-01-31 | End: 2022-01-31 | Stop reason: SDUPTHER

## 2022-01-31 RX ORDER — PHENAZOPYRIDINE HYDROCHLORIDE 100 MG/1
200 TABLET, FILM COATED ORAL ONCE
Status: COMPLETED | OUTPATIENT
Start: 2022-01-31 | End: 2022-01-31

## 2022-01-31 RX ORDER — ACETAMINOPHEN 325 MG/1
650 TABLET ORAL ONCE
Status: COMPLETED | OUTPATIENT
Start: 2022-01-31 | End: 2022-01-31

## 2022-01-31 RX ORDER — SODIUM CHLORIDE, SODIUM LACTATE, POTASSIUM CHLORIDE, CALCIUM CHLORIDE 600; 310; 30; 20 MG/100ML; MG/100ML; MG/100ML; MG/100ML
INJECTION, SOLUTION INTRAVENOUS CONTINUOUS
Status: DISCONTINUED | OUTPATIENT
Start: 2022-01-31 | End: 2022-01-31 | Stop reason: HOSPADM

## 2022-01-31 RX ORDER — MIDAZOLAM HYDROCHLORIDE 1 MG/ML
INJECTION INTRAMUSCULAR; INTRAVENOUS PRN
Status: DISCONTINUED | OUTPATIENT
Start: 2022-01-31 | End: 2022-01-31 | Stop reason: SDUPTHER

## 2022-01-31 RX ORDER — PHENYLEPHRINE HYDROCHLORIDE 10 MG/ML
INJECTION INTRAVENOUS PRN
Status: DISCONTINUED | OUTPATIENT
Start: 2022-01-31 | End: 2022-01-31 | Stop reason: SDUPTHER

## 2022-01-31 RX ORDER — DIMENHYDRINATE 50 MG/1
50 TABLET ORAL ONCE
Status: COMPLETED | OUTPATIENT
Start: 2022-01-31 | End: 2022-01-31

## 2022-01-31 RX ORDER — HYDROCODONE BITARTRATE AND ACETAMINOPHEN 5; 325 MG/1; MG/1
2 TABLET ORAL PRN
Status: DISCONTINUED | OUTPATIENT
Start: 2022-01-31 | End: 2022-01-31 | Stop reason: HOSPADM

## 2022-01-31 RX ORDER — FENTANYL CITRATE 50 UG/ML
25 INJECTION, SOLUTION INTRAMUSCULAR; INTRAVENOUS EVERY 5 MIN PRN
Status: DISCONTINUED | OUTPATIENT
Start: 2022-01-31 | End: 2022-01-31 | Stop reason: HOSPADM

## 2022-01-31 RX ORDER — PROMETHAZINE HYDROCHLORIDE 25 MG/ML
6.25 INJECTION, SOLUTION INTRAMUSCULAR; INTRAVENOUS
Status: DISCONTINUED | OUTPATIENT
Start: 2022-01-31 | End: 2022-01-31 | Stop reason: HOSPADM

## 2022-01-31 RX ORDER — SODIUM CHLORIDE 9 MG/ML
INJECTION INTRAVENOUS PRN
Status: DISCONTINUED | OUTPATIENT
Start: 2022-01-31 | End: 2022-01-31 | Stop reason: SDUPTHER

## 2022-01-31 RX ORDER — PROPOFOL 10 MG/ML
INJECTION, EMULSION INTRAVENOUS PRN
Status: DISCONTINUED | OUTPATIENT
Start: 2022-01-31 | End: 2022-01-31 | Stop reason: SDUPTHER

## 2022-01-31 RX ORDER — DEXAMETHASONE SODIUM PHOSPHATE 10 MG/ML
INJECTION, SOLUTION INTRAMUSCULAR; INTRAVENOUS PRN
Status: DISCONTINUED | OUTPATIENT
Start: 2022-01-31 | End: 2022-01-31 | Stop reason: SDUPTHER

## 2022-01-31 RX ORDER — SCOLOPAMINE TRANSDERMAL SYSTEM 1 MG/1
1 PATCH, EXTENDED RELEASE TRANSDERMAL ONCE
Status: DISCONTINUED | OUTPATIENT
Start: 2022-01-31 | End: 2022-01-31 | Stop reason: HOSPADM

## 2022-01-31 RX ORDER — HYDROCODONE BITARTRATE AND ACETAMINOPHEN 5; 325 MG/1; MG/1
1 TABLET ORAL PRN
Status: DISCONTINUED | OUTPATIENT
Start: 2022-01-31 | End: 2022-01-31 | Stop reason: HOSPADM

## 2022-01-31 RX ORDER — ONDANSETRON 2 MG/ML
4 INJECTION INTRAMUSCULAR; INTRAVENOUS ONCE
Status: COMPLETED | OUTPATIENT
Start: 2022-01-31 | End: 2022-01-31

## 2022-01-31 RX ORDER — METOCLOPRAMIDE HYDROCHLORIDE 5 MG/ML
10 INJECTION INTRAMUSCULAR; INTRAVENOUS
Status: DISCONTINUED | OUTPATIENT
Start: 2022-01-31 | End: 2022-01-31 | Stop reason: HOSPADM

## 2022-01-31 RX ORDER — KETOROLAC TROMETHAMINE 10 MG/1
10 TABLET, FILM COATED ORAL EVERY 6 HOURS PRN
Qty: 20 TABLET | Refills: 0 | Status: SHIPPED | OUTPATIENT
Start: 2022-01-31 | End: 2022-05-19 | Stop reason: ALTCHOICE

## 2022-01-31 RX ORDER — SODIUM CHLORIDE 0.9 % (FLUSH) 0.9 %
5-40 SYRINGE (ML) INJECTION PRN
Status: DISCONTINUED | OUTPATIENT
Start: 2022-01-31 | End: 2022-01-31 | Stop reason: HOSPADM

## 2022-01-31 RX ORDER — ATROPA BELLADONNA AND OPIUM 16.2; 3 MG/1; MG/1
SUPPOSITORY RECTAL PRN
Status: DISCONTINUED | OUTPATIENT
Start: 2022-01-31 | End: 2022-01-31 | Stop reason: SDUPTHER

## 2022-01-31 RX ORDER — DEXAMETHASONE SODIUM PHOSPHATE 4 MG/ML
INJECTION, SOLUTION INTRA-ARTICULAR; INTRALESIONAL; INTRAMUSCULAR; INTRAVENOUS; SOFT TISSUE PRN
Status: DISCONTINUED | OUTPATIENT
Start: 2022-01-31 | End: 2022-01-31 | Stop reason: SDUPTHER

## 2022-01-31 RX ORDER — SODIUM CHLORIDE 9 MG/ML
25 INJECTION, SOLUTION INTRAVENOUS PRN
Status: DISCONTINUED | OUTPATIENT
Start: 2022-01-31 | End: 2022-01-31 | Stop reason: HOSPADM

## 2022-01-31 RX ORDER — SODIUM CHLORIDE 0.9 % (FLUSH) 0.9 %
5-40 SYRINGE (ML) INJECTION EVERY 12 HOURS SCHEDULED
Status: DISCONTINUED | OUTPATIENT
Start: 2022-01-31 | End: 2022-01-31 | Stop reason: HOSPADM

## 2022-01-31 RX ORDER — GABAPENTIN 300 MG/1
300 CAPSULE ORAL ONCE
Status: COMPLETED | OUTPATIENT
Start: 2022-01-31 | End: 2022-01-31

## 2022-01-31 RX ORDER — SODIUM CHLORIDE 9 MG/ML
INJECTION INTRAVENOUS PRN
Status: DISCONTINUED | OUTPATIENT
Start: 2022-01-31 | End: 2022-01-31

## 2022-01-31 RX ORDER — LIDOCAINE HYDROCHLORIDE 20 MG/ML
INJECTION, SOLUTION EPIDURAL; INFILTRATION; INTRACAUDAL; PERINEURAL PRN
Status: DISCONTINUED | OUTPATIENT
Start: 2022-01-31 | End: 2022-01-31 | Stop reason: SDUPTHER

## 2022-01-31 RX ORDER — METOCLOPRAMIDE HYDROCHLORIDE 5 MG/ML
10 INJECTION INTRAMUSCULAR; INTRAVENOUS ONCE
Status: COMPLETED | OUTPATIENT
Start: 2022-01-31 | End: 2022-01-31

## 2022-01-31 RX ORDER — ROCURONIUM BROMIDE 10 MG/ML
INJECTION, SOLUTION INTRAVENOUS PRN
Status: DISCONTINUED | OUTPATIENT
Start: 2022-01-31 | End: 2022-01-31 | Stop reason: SDUPTHER

## 2022-01-31 RX ORDER — KETOROLAC TROMETHAMINE 30 MG/ML
INJECTION, SOLUTION INTRAMUSCULAR; INTRAVENOUS PRN
Status: DISCONTINUED | OUTPATIENT
Start: 2022-01-31 | End: 2022-01-31 | Stop reason: SDUPTHER

## 2022-01-31 RX ORDER — ROPIVACAINE HYDROCHLORIDE 5 MG/ML
INJECTION, SOLUTION EPIDURAL; INFILTRATION; PERINEURAL PRN
Status: DISCONTINUED | OUTPATIENT
Start: 2022-01-31 | End: 2022-01-31 | Stop reason: SDUPTHER

## 2022-01-31 RX ORDER — HYDROCODONE BITARTRATE AND ACETAMINOPHEN 5; 325 MG/1; MG/1
1 TABLET ORAL EVERY 6 HOURS PRN
Qty: 10 TABLET | Refills: 0 | Status: SHIPPED | OUTPATIENT
Start: 2022-01-31 | End: 2022-02-05

## 2022-01-31 RX ADMIN — ROCURONIUM BROMIDE 20 MG: 10 SOLUTION INTRAVENOUS at 09:35

## 2022-01-31 RX ADMIN — ROPIVACAINE HYDROCHLORIDE 20 ML: 5 INJECTION, SOLUTION EPIDURAL; INFILTRATION; PERINEURAL at 08:00

## 2022-01-31 RX ADMIN — PHENYLEPHRINE HYDROCHLORIDE 100 MCG: 10 INJECTION INTRAVENOUS at 10:22

## 2022-01-31 RX ADMIN — MIDAZOLAM 2 MG: 1 INJECTION INTRAMUSCULAR; INTRAVENOUS at 07:50

## 2022-01-31 RX ADMIN — ROCURONIUM BROMIDE 30 MG: 10 SOLUTION INTRAVENOUS at 08:43

## 2022-01-31 RX ADMIN — PHENYLEPHRINE HYDROCHLORIDE 200 MCG: 10 INJECTION INTRAVENOUS at 08:58

## 2022-01-31 RX ADMIN — METOCLOPRAMIDE 10 MG: 5 INJECTION, SOLUTION INTRAMUSCULAR; INTRAVENOUS at 07:21

## 2022-01-31 RX ADMIN — SODIUM CHLORIDE 20 ML: 9 INJECTION INTRAMUSCULAR; INTRAVENOUS; SUBCUTANEOUS at 08:00

## 2022-01-31 RX ADMIN — LIDOCAINE HYDROCHLORIDE 4 ML: 20 INJECTION, SOLUTION EPIDURAL; INFILTRATION; INTRACAUDAL; PERINEURAL at 08:43

## 2022-01-31 RX ADMIN — SUGAMMADEX 200 MG: 100 INJECTION, SOLUTION INTRAVENOUS at 10:43

## 2022-01-31 RX ADMIN — FENTANYL CITRATE 75 MCG: 50 INJECTION INTRAMUSCULAR; INTRAVENOUS at 08:43

## 2022-01-31 RX ADMIN — FENTANYL CITRATE 25 MCG: 50 INJECTION INTRAMUSCULAR; INTRAVENOUS at 07:51

## 2022-01-31 RX ADMIN — PHENAZOPYRIDINE HYDROCHLORIDE 200 MG: 100 TABLET ORAL at 13:11

## 2022-01-31 RX ADMIN — ATROPA BELLADONNA AND OPIUM 30 MG: 16.2; 3 SUPPOSITORY RECTAL at 08:52

## 2022-01-31 RX ADMIN — PROPOFOL 150 MG: 10 INJECTION, EMULSION INTRAVENOUS at 08:43

## 2022-01-31 RX ADMIN — ONDANSETRON 4 MG: 2 INJECTION INTRAMUSCULAR; INTRAVENOUS at 10:42

## 2022-01-31 RX ADMIN — ENOXAPARIN SODIUM 40 MG: 100 INJECTION SUBCUTANEOUS at 08:31

## 2022-01-31 RX ADMIN — PHENYLEPHRINE HYDROCHLORIDE 200 MCG: 10 INJECTION INTRAVENOUS at 09:33

## 2022-01-31 RX ADMIN — PHENYLEPHRINE HYDROCHLORIDE 100 MCG: 10 INJECTION INTRAVENOUS at 09:39

## 2022-01-31 RX ADMIN — PHENYLEPHRINE HYDROCHLORIDE 200 MCG: 10 INJECTION INTRAVENOUS at 09:06

## 2022-01-31 RX ADMIN — KETOROLAC TROMETHAMINE 30 MG: 30 INJECTION, SOLUTION INTRAMUSCULAR at 10:43

## 2022-01-31 RX ADMIN — FAMOTIDINE 20 MG: 10 INJECTION INTRAVENOUS at 09:56

## 2022-01-31 RX ADMIN — DIMENHYDRINATE 50 MG: 50 TABLET ORAL at 07:18

## 2022-01-31 RX ADMIN — PHENYLEPHRINE HYDROCHLORIDE 100 MCG: 10 INJECTION INTRAVENOUS at 09:30

## 2022-01-31 RX ADMIN — ONDANSETRON 4 MG: 2 INJECTION INTRAMUSCULAR; INTRAVENOUS at 07:21

## 2022-01-31 RX ADMIN — ROCURONIUM BROMIDE 20 MG: 10 SOLUTION INTRAVENOUS at 08:49

## 2022-01-31 RX ADMIN — SODIUM CHLORIDE, POTASSIUM CHLORIDE, SODIUM LACTATE AND CALCIUM CHLORIDE: 600; 310; 30; 20 INJECTION, SOLUTION INTRAVENOUS at 07:20

## 2022-01-31 RX ADMIN — FAMOTIDINE 20 MG: 10 INJECTION, SOLUTION INTRAVENOUS at 07:21

## 2022-01-31 RX ADMIN — DEXAMETHASONE SODIUM PHOSPHATE 4 MG: 4 INJECTION, SOLUTION INTRAMUSCULAR; INTRAVENOUS at 08:52

## 2022-01-31 RX ADMIN — ACETAMINOPHEN 650 MG: 325 TABLET ORAL at 07:19

## 2022-01-31 RX ADMIN — DEXAMETHASONE SODIUM PHOSPHATE 10 MG: 10 INJECTION, SOLUTION INTRAMUSCULAR; INTRAVENOUS at 08:00

## 2022-01-31 RX ADMIN — GABAPENTIN 300 MG: 300 CAPSULE ORAL at 07:18

## 2022-01-31 RX ADMIN — PHENYLEPHRINE HYDROCHLORIDE 200 MCG: 10 INJECTION INTRAVENOUS at 09:02

## 2022-01-31 RX ADMIN — CEFAZOLIN SODIUM 2000 MG: 10 INJECTION, POWDER, FOR SOLUTION INTRAVENOUS at 08:32

## 2022-01-31 ASSESSMENT — PAIN DESCRIPTION - DESCRIPTORS
DESCRIPTORS: DISCOMFORT
DESCRIPTORS: DISCOMFORT

## 2022-01-31 ASSESSMENT — PAIN DESCRIPTION - PAIN TYPE
TYPE: SURGICAL PAIN
TYPE: SURGICAL PAIN

## 2022-01-31 ASSESSMENT — PAIN DESCRIPTION - LOCATION
LOCATION: ABDOMEN
LOCATION: ABDOMEN

## 2022-01-31 ASSESSMENT — PAIN SCALES - GENERAL
PAINLEVEL_OUTOF10: 3
PAINLEVEL_OUTOF10: 3
PAINLEVEL_OUTOF10: 0

## 2022-01-31 NOTE — PROGRESS NOTES
Patient states ready to be discharged at this time. Discharge instructions given to pt and spouse. Both verbalize understanding,deny any questions and/or concerns. Pt transfer off unit in wheelchair w/ spouse and all belongings.

## 2022-01-31 NOTE — H&P
HISTORY AND PHYSICAL             Date: 2022        Patient Name: Neetu Bass     YOB: 1970      Age:  45 y.o. Chief Complaint   No chief complaint on file. History Obtained From   patient    History of Present Illness   Pt had ablation last dec for heavy menses; states that now she has multiple abnl findings on an MRI that was done by urology after she had recurrent utis and hematuria; usn prior to ablation showed adenomyosis; MRI shows adenomyosis, endometrial polyp and cervical mass ?fibroid; she had nl pap with dr Gomez Childs this year; pt wants hysterectomy but wants it done laparoscopically; disc'd ra tlh/bso; r/b/a reviewed    Past Medical History     Past Medical History:   Diagnosis Date    Diabetes mellitus ([address]     [place]     PHI HEARING AIDS    Hyperlipidemia     Hypertension     PONV (postoperative nausea and vomiting)         Past Surgical History     Past Surgical History:   Procedure Laterality Date    APPENDECTOMY       SECTION  2003    DILATION AND CURETTAGE OF UTERUS N/A 2020    DILATATION AND CURETTAGE HYSTEROSCOPY CAUTERY ABLATION, NOVASURE performed by Juvenal Meigs, MD at [facility]    Dr. Garcia Je - vaginal wart removal    TUBAL LIGATION  2004        Medications Prior to Admission     Prior to Admission medications    Medication Sig Start Date End Date Taking?  Authorizing Provider   metFORMIN (GLUCOPHAGE) 1000 MG tablet Take 1 tablet by mouth 2 times daily (with meals) 21  Yes Cristóbal Zhang MD   SITagliptin (JANUVIA) 100 MG tablet Take 1 tablet by mouth daily 21  Yes Cristóbal Zhang MD   lisinopril (PRINIVIL;ZESTRIL) 10 MG tablet Take 1 tablet by mouth daily 21  Yes Cristóbal Zhang MD   ACCU-CHEK DARRELL PLUS strip USE TO TEXT BLOOD SUGAR DAILY AS NEEDED. 21  Yes Cristóbal Zhang MD   TRUEplus Lancets 33G MISC USE ONCE A DAY 21  Yes Álvaro Harrison MD   nitrofurantoin (MACRODANTIN) 50 MG capsule Take 1 capsule by mouth daily 8/10/21  Yes BISHNU Gonsalez - CNP   amLODIPine (NORVASC) 5 MG tablet Take 1 tablet by mouth daily 21  Yes Álvaro Harrison MD   Blood Glucose Monitoring Suppl (ACCU-CHEK DARRELL PLUS) W/DEVICE KIT 1 kit by Does not apply route daily 16 Yes Dion Dominguez MD   atorvastatin (LIPITOR) 40 MG tablet TAKE 1 TABLET BY MOUTH ONCE A DAY 20  Dion Dominguez MD        Allergies   Cat hair extract, Dust mite extract, and Penicillins    Social History     Social History     Tobacco History     Smoking Status  Never Smoker    Smokeless Tobacco Use  Never Used          Alcohol History     Alcohol Use Status  No          Drug Use     Drug Use Status  No          Sexual Activity     Sexually Active  Not Currently Partners  Male                Family History     Family History   Problem Relation Age of Onset    Heart Disease Paternal Grandfather 48         in his early 52's from heart or aneurysm    Diabetes Maternal Grandfather     Heart Attack Maternal Grandfather 80    Hypertension Father     Kidney Cancer Father 59    Lung Cancer Father 68         at age 68 from recurrent lung cancer    Heart Surgery Father     Heart Disease Father     Diabetes Mother     Hypertension Mother     Hypertension Sister     Mental Illness Brother         schizophrenia and bipolar disorder    Hypertension Brother        Review of Systems   Review of Systems   Constitutional: Negative for chills, fatigue and fever. Genitourinary: Positive for menstrual problem and pelvic pain. Negative for dysuria and vaginal discharge. Physical Exam   BP (!) 144/104   Temp 98.8 °F (37.1 °C) (Temporal)   Resp 22   Ht 5' 2\" (1.575 m)   Wt 213 lb 9.6 oz (96.9 kg)   LMP 2021   SpO2 96%   BMI 39.07 kg/m²     Physical Exam  Constitutional:       Appearance: Normal appearance.    HENT:      Head:

## 2022-01-31 NOTE — OP NOTE
Operative Note    Preoperative diagnosis:   1. Menorrhagia  2. Adenomyosis  3. Fibroid    Postoperative diagnosis: Same    Procedure:  Robotic-assisted Total Laparoscopic Hysterectomy with Bilateral Salpingo-oophorectomy    Surgeon: Dr. Duy Arzate    Asst.: Ron Miller PGY3, Ronald Terrell    Anesthesia: Gen. Estimated blood loss: 25 mL    Fluids: LR    Urine: 100 mL of clear urine    Condition: Stable    Complications: None    Findings: The patient had an anteverted uterus which sounded to 10 cm in depth. Bilateral ureteral peristalsis was noted throughout the case and after closure of the vaginal cuff. Specimen removed: Uterus and Bilateral tubes and ovaries    Operative note: The patient was taken to the operating room where general anesthesia was obtained without difficulty. She was prepped and draped usual sterile fashion in a dorsal lithotomy position. Timeout was performed. A weighted speculum was placed in the patient's vagina and the anterior lip of the cervix was grasped with a single-tooth tenaculum. The cervix was progressively dilated and the uterus was sounded with the findings noted above. A V care uterine manipulator was then placed. A Hobson catheter was placed and left to gravity drainage. At this point attention was turned to the patient's abdomen where a supraumbilical incision was made with a scalpel. A veress needle was then carefully introduced at a 45° angle while tenting the abdominal wall. Intraabdominal placement was confirmed by use of water-filled syringe and drop in intra-abdominal pressure with insufflation of CO2. Pneumoperitoneum was obtained to half liters of CO2. A robotic port was then placed without difficulty and intra-abdominal placement was confirmed by laparoscopy. Second and third ports were then placed under direct visualization approximately 4 cm inferior and  5 cm lateral to the first.  8 mm robotic ports were placed in these locations.   A fourth and final port was placed to the right. We placed a robotic port in this location. At this point the patient was placed into steep Trendelenburg position. The robot was brought into position and docked. The patient developed difficulty with ventilation and the robot was undocked and the patient was flattened. The intraabdominal pressure was dropped to 12 mm. Once the patient's ventilation was improved we placed the patient into trendelenburg again with no sequelae. The robot was brought into position and docked. The right arm was loaded with the scissors with monopolar cautery. The left side was loaded with the fenestrated bipolar. Attention was then turned to the console portion of the surgery. Beginning with the left cornual region of uterus, the infundibulopelvic ligament pedicle were ligated with the fenestrated bipolar and transected with the scissors. Dissection continued along the mesosalpinx of the broad ligament just lateral to the tube until the round ligament was ligated and transected. The anterior uterine serosa was then undermined and taken down to free the bladder from the lower uterine segment and cervix. This was done working from the left side to the midline. Attention was then turned to the right cornual region of the uterus and in a similar manner the tube and ovary were ligated and transected. Dissection again continued along the broad ligament until the round ligament was ligated and transected. The remainder of the bladder flap was then taken down. Once the bladder was freed from the lower uterine segment and cervix, the uterine arteries were skeletonized, ligated, and transected bilaterally. Using the scissors, the uterus was amputated just beneath the cervix using the groove of the V care as a guide. The uterus, tube, and ovaries were then withdrawn into the vagina to maintain pneumoperitoneum.   The vaginal cuff was then closed with V lock suture working from right to left using 2-0 suture. Copious amounts of irrigation were then used to evaluate the cuff and pedicles to assure hemostasis. Herlene Antonella was placed over the vaginal cuff for continued hemostasis. Bilateral ureteral peristalsis was again noted. At this point the instruments removed from the abdomen. The skin incisions were closed with 4-0 Monocryl in a subcuticular fashion. The uterus and bilateral tubes and ovaries were handed off to pathology. The vaginal cuff was then examined with no evidence of bleeding. The Hobson catheter was removed. Sponge, lap, needle and instrument counts were correct ×2 and the patient was taken to the recovery area in apparently stable condition. Surgical Assistant documentation:    An assistant surgeon was required with this surgery. She was able to provide the necessary visualization, uterine manipulation, and suturing. Having her available allowed this case to be perfomed in a safe and timely manner.

## 2022-01-31 NOTE — ANESTHESIA PROCEDURE NOTES
Peripheral Block    Patient location during procedure: pre-op  Start time: 1/31/2022 7:50 AM  End time: 1/31/2022 8:00 AM  Staffing  Performed: resident/CRNA   Resident/CRNA: BISHNU Pan CRNA  Preanesthetic Checklist  Completed: patient identified, IV checked, site marked, risks and benefits discussed, surgical consent, monitors and equipment checked, pre-op evaluation, timeout performed, anesthesia consent given, oxygen available and patient being monitored  Peripheral Block  Patient position: supine  Prep: ChloraPrep  Patient monitoring: cardiac monitor, continuous pulse ox, IV access and frequent blood pressure checks  Block type: TAP  Laterality: bilateral  Injection technique: single-shot  Guidance: ultrasound guided  Local infiltration: decadron and ropivacaine  Infiltration strength: 0.5 %  Dose: 58 mL  Provider prep: sterile gloves and mask  Local infiltration: decadron and ropivacaine  Needle  Needle type:  Other   Needle gauge: 22 G  Needle length: 11 cm  Needle localization: ultrasound guidanceOther needle type: [place]  Assessment  Injection assessment: negative aspiration for heme, no paresthesia on injection and local visualized surrounding nerve on ultrasound  Paresthesia pain: none  Slow fractionated injection: yes  Hemodynamics: stable  Reason for block: post-op pain management and at surgeon's request

## 2022-01-31 NOTE — PROGRESS NOTES
Patient verbalizes readiness for discharge at this time. Discharge Criteria    Inpatients must meet Criteria 1 through 7. All other patients are either YES or N/A. If a NO is chosen then Anesthesia or Surgeon must be notified. 1.  Minimum 30 minutes after last dose of sedative medication, minimum 120 minutes after last dose of reversal agent. Yes      2. Systolic BP stable within 20 mmHg for 30 minutes & systolic BP between 90 & 624 or within 10 mmHg of baseline. Yes      3. Pulse between 60 and 100 or within 10 bpm of baseline. Yes      4. Spontaneous respiratory rate >/= 10 per minute. Yes      5. SaO2 >/= 95 or  >/= baseline. Yes      6. Able to cough and swallow or return to baseline function. Yes      7. Alert and oriented or return to baseline mental status. Yes      8. Demonstrates controlled, coordinated movements, ambulates with steady gait, or return to baseline activity function. Yes      9. Minimal or no pain or nausea, or at a level tolerable and acceptable to patient. Yes      10. Takes and retains oral fluids as allowed. Yes      11. Procedural / perioperative site stable. Minimal or no bleeding. Yes          12. If GI endoscopy procedure, minimal or no abdominal distention or passing flatus. N/A      13. Written discharge instructions and emergency telephone number provided. Yes      14. Accompanied by a responsible adult.     Yes

## 2022-01-31 NOTE — ANESTHESIA PRE PROCEDURE
Department of Anesthesiology  Preprocedure Note       Name:  Buzz Maravilla   Age:  45 y.o.  :  1970                                          MRN:  [id number]         Date:  2022      Surgeon: Corinthia Goodpasture):  Joey Chandler DO    Procedure: Procedure(s): HYSTERECTOMY VAGINAL LAPAROSCOPIC ROBOTIC ASSISTED - BSO - POSSIBLE LAPAROSCOPIC COLPOPEXY    Medications prior to admission:   Prior to Admission medications    Medication Sig Start Date End Date Taking? Authorizing Provider   metFORMIN (GLUCOPHAGE) 1000 MG tablet Take 1 tablet by mouth 2 times daily (with meals) 21   Hieu De La Cruz MD   SITagliptin (JANUVIA) 100 MG tablet Take 1 tablet by mouth daily 21   Hieu De La Cruz MD   lisinopril (PRINIVIL;ZESTRIL) 10 MG tablet Take 1 tablet by mouth daily 21   Hieu De La Cruz MD   ACCU-CHEK DARRELL PLUS strip USE TO TEXT BLOOD SUGAR DAILY AS NEEDED. 21   Hieu De La Cruz MD   TRUEplus Lancets 33G MISC USE ONCE A DAY 21   Hieu De La Cruz MD   nitrofurantoin (MACRODANTIN) 50 MG capsule Take 1 capsule by mouth daily 8/10/21   BISHNU Harrington - CNP   amLODIPine (NORVASC) 5 MG tablet Take 1 tablet by mouth daily 21   Hieu De La Cruz MD   atorvastatin (LIPITOR) 40 MG tablet TAKE 1 TABLET BY MOUTH ONCE A DAY 20  Jimena Amato MD   Blood Glucose Monitoring Suppl (ACCU-CHEK DARRELL PLUS) W/DEVICE KIT 1 kit by Does not apply route daily 16  Jimena Amato MD       Current medications:    No current facility-administered medications for this visit. No current outpatient medications on file.      Facility-Administered Medications Ordered in Other Visits   Medication Dose Route Frequency Provider Last Rate Last Admin    scopolamine (TRANSDERM-SCOP) transdermal patch 1 patch  1 patch TransDERmal Once BISHNU Oneal CRNA   1 patch at 22 1009    lactated ringers infusion   IntraVENous Continuous Renee Livings L Brianna Joel, APRN - CRNA 100 mL/hr at 22 0720 New Bag at 22 0720       Allergies: Allergies   Allergen Reactions    Cat Hair Extract     Dust Mite Extract     Penicillins Hives       Problem List:    Patient Active Problem List   Diagnosis Code    Shingles B02.9    Type 2 diabetes mellitus not at goal [facility]) E11.9    Fibroid uterus D25.9    Hyperlipidemia E78.5    DUB (dysfunctional uterine bleeding) N93.8       Past Medical History:        Diagnosis Date    Diabetes mellitus ([address]     [place] (hard of hearing)     PHI HEARING AIDS    Hyperlipidemia     Hypertension     PONV (postoperative nausea and vomiting)        Past Surgical History:        Procedure Laterality Date    APPENDECTOMY  1979     SECTION  2003    DILATION AND CURETTAGE OF UTERUS N/A 2020    DILATATION AND CURETTAGE HYSTEROSCOPY CAUTERY ABLATION, NOVASURE performed by Hieu Haque MD at [facility]    Dr. Jeannine Crandall - vaginal wart removal    TUBAL LIGATION         Social History:    Social History     Tobacco Use    Smoking status: Never Smoker    Smokeless tobacco: Never Used   Substance Use Topics    Alcohol use: No                                Counseling given: Not Answered      Vital Signs (Current): There were no vitals filed for this visit.                                            BP Readings from Last 3 Encounters:   22 (!) 144/104   22 138/84   21 134/85       NPO Status:                                                                                 BMI:   Wt Readings from Last 3 Encounters:   22 213 lb 9.6 oz (96.9 kg)   22 216 lb (98 kg)   21 215 lb (97.5 kg)     There is no height or weight on file to calculate BMI.    CBC:   Lab Results   Component Value Date    WBC 13.6 2022    RBC 4.54 2022    HGB 12.9 2022    HCT 40.0 2022    MCV 88.1 2022    RDW 12.9 2022     01/25/2022       CMP:   Lab Results   Component Value Date     01/25/2022    K 4.4 01/25/2022    CL 98 01/25/2022    CO2 25 01/25/2022    BUN 16 01/25/2022    CREATININE 0.59 01/25/2022    GFRAA >60 01/25/2022    LABGLOM >60 01/25/2022    GLUCOSE 235 01/25/2022    PROT 7.4 01/25/2022    CALCIUM 10.0 01/25/2022    BILITOT 0.48 01/25/2022    ALKPHOS 107 01/25/2022    AST 90 01/25/2022     01/25/2022       POC Tests:   Recent Labs     01/31/22  0718   POCGLU 229*       Coags:   Lab Results   Component Value Date    PROTIME 9.8 02/16/2020    INR 1.0 02/16/2020    APTT 26.1 02/16/2020       HCG (If Applicable): No results found for: PREGTESTUR, PREGSERUM, HCG, HCGQUANT     ABGs: No results found for: PHART, PO2ART, NFV1ZRZ, TAJ3FSQ, BEART, Q6LZVUUT     Type & Screen (If Applicable):  No results found for: LABABO, LABRH    Drug/Infectious Status (If Applicable):  Lab Results   Component Value Date    HEPCAB NONREACTIVE 11/18/2021       COVID-19 Screening (If Applicable):   Lab Results   Component Value Date    COVID19 Not Detected 11/27/2020         Anesthesia Evaluation  Patient summary reviewed and Nursing notes reviewed   history of anesthetic complications: PONV.   Airway: Mallampati: I  TM distance: >3 FB   Neck ROM: full  Mouth opening: > = 3 FB Dental: normal exam         Pulmonary:Negative Pulmonary ROS and normal exam  breath sounds clear to auscultation                             Cardiovascular:  Exercise tolerance: good (>4 METS),   (+) hypertension:, hyperlipidemia    (-) CABG/stent and  angina    ECG reviewed  Rhythm: regular  Rate: normal           Beta Blocker:  Not on Beta Blocker      ROS comment: 1/25/22  Normal sinus rhythm  Normal ECG  When compared with ECG of 30-NOV-2020 12:30,  No significant change was found       Neuro/Psych:   Negative Neuro/Psych ROS              GI/Hepatic/Renal: Neg GI/Hepatic/Renal ROS            Endo/Other:    (+) DiabetesType II DM, well controlled, , .          Pt had PAT visit. ROS comment: A1C 8.8 Abdominal:   (+) obese,           Vascular: negative vascular ROS. Other Findings:               Anesthesia Plan      general     ASA 3       Induction: intravenous. MIPS: Prophylactic antiemetics administered. Anesthetic plan and risks discussed with patient. Use of blood products discussed with patient whom consented to blood products. Plan discussed with CRNA.                   BISHNU Burleson - CRNA   1/31/2022

## 2022-01-31 NOTE — ANESTHESIA POSTPROCEDURE EVALUATION
Department of Anesthesiology  Postprocedure Note    Patient: Natasha Denise  MRN: [id number]  YOB: 1970  Date of evaluation: 1/31/2022  Time:  1:03 PM     Procedure Summary     Date: 01/31/22 Room / Location: [address] [place] [address]    Anesthesia Start: 7833 Anesthesia Stop: 1059    Procedure: HYSTERECTOMY VAGINAL LAPAROSCOPIC ROBOTIC ASSISTED - BSO (N/A ) Diagnosis: (ADENOMYOSIS, ENDOMETRIAL POLYP, IRREGULAR MENSES)    Surgeons: Selena Knight DO Responsible Provider: BISHNU Buitrago CRNA    Anesthesia Type: general ASA Status: 3          Anesthesia Type: general    Jose D Phase I: Jose D Score: 9    Jose D Phase II: Jose D Score: 10    Last vitals: Reviewed and per EMR flowsheets.        Anesthesia Post Evaluation    Patient location during evaluation: PACU  Patient participation: complete - patient participated  Level of consciousness: awake and alert  Pain score: 2  Airway patency: patent  Nausea & Vomiting: no nausea and no vomiting  Complications: no  Cardiovascular status: hemodynamically stable  Respiratory status: acceptable  Hydration status: euvolemic  Multimodal analgesia pain management approach

## 2022-02-02 LAB — SURGICAL PATHOLOGY REPORT: NORMAL

## 2022-02-07 ENCOUNTER — TELEPHONE (OUTPATIENT)
Dept: UROLOGY | Age: 52
End: 2022-02-07

## 2022-02-07 NOTE — TELEPHONE ENCOUNTER
Percy Kent is scheduled for an appointment on 2/10/2022, she did have a complete hysterectomy a week ago and wanted to know if she really did need to be seen since she was referred to OB/GYN from this office?

## 2022-02-08 RX ORDER — NITROFURANTOIN MACROCRYSTALS 50 MG/1
50 CAPSULE ORAL DAILY
Qty: 90 CAPSULE | Refills: 1 | Status: SHIPPED | OUTPATIENT
Start: 2022-02-08 | End: 2022-08-08 | Stop reason: SDUPTHER

## 2022-02-08 NOTE — TELEPHONE ENCOUNTER
Lalo Naik, appt made for 5/10/2022. Is she supposed to continue taking the Macrodantin 50mg? She started this 8/10/2021 and not states to reevaluate in 6 months.   She has no refills

## 2022-02-16 ENCOUNTER — OFFICE VISIT (OUTPATIENT)
Dept: OBGYN | Age: 52
End: 2022-02-16

## 2022-02-16 VITALS
DIASTOLIC BLOOD PRESSURE: 68 MMHG | SYSTOLIC BLOOD PRESSURE: 122 MMHG | WEIGHT: 211 LBS | BODY MASS INDEX: 38.83 KG/M2 | HEIGHT: 62 IN

## 2022-02-16 DIAGNOSIS — Z09 POSTOPERATIVE EXAMINATION: Primary | ICD-10-CM

## 2022-02-16 PROCEDURE — 99024 POSTOP FOLLOW-UP VISIT: CPT

## 2022-02-16 NOTE — PROGRESS NOTES
Postop Progress Note    Subjective    China Erickson presents to the office for postop follow up. Chief Complaint   Patient presents with    Post-Op Check     Patient is being seen after TLH/BSO that was performed 1/31/22. She has been feeling well, denies any bleeding or discharge. Objective    Vitals:    02/16/22 1013   BP: 122/68     General: alert, cooperative and no distress  Incision: healing well    Assessment  Doing well postoperatively. Steri-strips still in place - made her aware that she can remove these now. No drainage or bleeding noted. Denies discharge, spotting, pain. Has returned to work but is seated, not lifting, doing well. Will return for 6-week visit in 4 weeks. Plan  1. Continue any current medications  2. Wound care discussed  3. Pt is to continue with restrictions   4. Usual diet  5.  Follow up: 4 weeks    Electronically signed by Mary Valiente PA-C on 2/16/2022 at 10:27 AM

## 2022-03-01 ENCOUNTER — OFFICE VISIT (OUTPATIENT)
Dept: OBGYN | Age: 52
End: 2022-03-01

## 2022-03-01 VITALS
HEIGHT: 62 IN | BODY MASS INDEX: 38.46 KG/M2 | DIASTOLIC BLOOD PRESSURE: 82 MMHG | SYSTOLIC BLOOD PRESSURE: 134 MMHG | WEIGHT: 209 LBS

## 2022-03-01 DIAGNOSIS — Z09 POSTOPERATIVE EXAMINATION: Primary | ICD-10-CM

## 2022-03-01 PROCEDURE — 99024 POSTOP FOLLOW-UP VISIT: CPT | Performed by: OBSTETRICS & GYNECOLOGY

## 2022-03-01 NOTE — PROGRESS NOTES
Postop Progress Note    Subjective    Kailyn Hedrick presents to the office for postop follow up. Chief Complaint   Patient presents with    Post-Op Check     Patient is being seen post op TLH/BSO on 1/31/22. She has been feeling well denies any bleeding or discharge. Objective    Vitals:    03/01/22 1408   BP: 134/82     General: alert, cooperative and no distress  Incision: healing well  Vag cuff well supported and intact - no bleeding or discharge    Assessment  Doing well postoperatively. Plan  1. Continue any current medications  2. Wound care discussed  3. Pt is to increase activities as tolerated  4.   F/U for annual or prn    Electronically signed by Lorin Dawn DO on 3/1/2022 at 2:18 PM

## 2022-05-10 ENCOUNTER — HOSPITAL ENCOUNTER (OUTPATIENT)
Age: 52
Setting detail: SPECIMEN
Discharge: HOME | End: 2022-05-10
Payer: COMMERCIAL

## 2022-05-10 ENCOUNTER — OFFICE VISIT (OUTPATIENT)
Dept: UROLOGY | Age: 52
End: 2022-05-10
Payer: COMMERCIAL

## 2022-05-10 VITALS
HEIGHT: 62 IN | RESPIRATION RATE: 16 BRPM | TEMPERATURE: 98.2 F | HEART RATE: 80 BPM | WEIGHT: 208 LBS | BODY MASS INDEX: 38.28 KG/M2 | DIASTOLIC BLOOD PRESSURE: 76 MMHG | SYSTOLIC BLOOD PRESSURE: 131 MMHG

## 2022-05-10 DIAGNOSIS — R31.0 GROSS HEMATURIA: ICD-10-CM

## 2022-05-10 DIAGNOSIS — R31.0 GROSS HEMATURIA: Primary | ICD-10-CM

## 2022-05-10 DIAGNOSIS — R35.0 FREQUENCY OF URINATION: ICD-10-CM

## 2022-05-10 DIAGNOSIS — N39.0 FREQUENT UTI: ICD-10-CM

## 2022-05-10 LAB
-: ABNORMAL
AMORPHOUS: ABNORMAL
BACTERIA: ABNORMAL
BILIRUBIN URINE: NEGATIVE
COLOR: YELLOW
EPITHELIAL CELLS UA: ABNORMAL /HPF (ref 0–25)
GLUCOSE URINE: ABNORMAL
KETONES, URINE: ABNORMAL
LEUKOCYTE ESTERASE, URINE: NEGATIVE
NITRITE, URINE: NEGATIVE
PH UA: 5.5 (ref 5–9)
PROTEIN UA: NEGATIVE
RBC UA: ABNORMAL /HPF (ref 0–2)
RENAL EPITHELIAL, UA: ABNORMAL /HPF
SPECIFIC GRAVITY UA: >1.03 (ref 1.01–1.02)
TURBIDITY: CLEAR
URINE HGB: NEGATIVE
UROBILINOGEN, URINE: NORMAL
WBC UA: ABNORMAL /HPF (ref 0–5)

## 2022-05-10 PROCEDURE — 99213 OFFICE O/P EST LOW 20 MIN: CPT | Performed by: PHYSICIAN ASSISTANT

## 2022-05-10 PROCEDURE — 51798 US URINE CAPACITY MEASURE: CPT | Performed by: PHYSICIAN ASSISTANT

## 2022-05-10 PROCEDURE — 81001 URINALYSIS AUTO W/SCOPE: CPT

## 2022-05-10 PROCEDURE — 87086 URINE CULTURE/COLONY COUNT: CPT

## 2022-05-10 ASSESSMENT — ENCOUNTER SYMPTOMS
COUGH: 0
CONSTIPATION: 0
WHEEZING: 0
APNEA: 0
EYE REDNESS: 0
VOMITING: 0
SHORTNESS OF BREATH: 0
ABDOMINAL PAIN: 0
COLOR CHANGE: 0
NAUSEA: 0
BACK PAIN: 0

## 2022-05-10 NOTE — PROGRESS NOTES
HPI:    Patient is a 45 y.o. female in no acute distress. She is alert and oriented to person, place, and time. History  4/2021 Referral from Dr. Ada Ko for gross hematuria. She has been having intermittent gross hematuria since her ablation in 12/2020. This has not been associated with flank or abdominal pain. She was never a smoker. She works at [employer]. She denies any history of stones. She does have baseline frequency every 1-2 hours, urge incontinence, and nocturia 2-3 times per night. PVR 96 mL. This is secondary to diabetes. Hgb A1c from 10/2020 was 6.7. She has never seen urology in the past.     CT urogram was negative for  abnormalities. Cystoscopy and pelvic showed normal anatomy    Frequent UTI  7/2021 - group beta strep, e.coli  6/2021 - group beta strep  4/2021 - group beta strep  11/2020 - e.coli    8/2021 -started on Macrobid 50 mg for UTI prophylaxis    1/2022 - hysterectomy -        -BENIGN ENDOMETRIUM BASALIS, NEGATIVE FOR ATYPIA AND NEOPLASIA. ADENOMYOSIS. SEROSAL ADHESIONS. CERVIX WITHOUT SPECIFIC HISTOLOGIC ABNORMALITY. BILATERAL OVARIES AND FALLOPIAN TUBES: -NO SPECIFIC HISTOLOGIC ABNORMALITY IDENTIFIED. Today  Patient is here today for follow-up of gross hematuria, recurrent urinary tract infection, urgency and frequency. Earlier in the year patient did have a hysterectomy secondary to cervical mass and endometrial polyp. Pathology for this was benign. Patient continues to take Macrobid 50 mg daily for UTI prophylaxis. Patient has been tolerating this well. Patient has not had a urinary tract infection for 10 months. Patient states that her urinary frequency incontinence and nocturia have all improved. She has a daily bowel move which soft and easy to pass. She drinks mostly water during the day.       Past Medical History:   Diagnosis Date    Diabetes mellitus ([address]     [place] (hard of hearing)     PHI HEARING AIDS    Hyperlipidemia     Hypertension     PONV (postoperative nausea and vomiting)      Past Surgical History:   Procedure Laterality Date    APPENDECTOMY  1979     SECTION  2003    DILATION AND CURETTAGE OF UTERUS N/A 2020    DILATATION AND CURETTAGE HYSTEROSCOPY CAUTERY ABLATION, NOVASURE performed by Margaux Rodríguez MD at [address]  2022    VAGINAL LAPAROSCOPIC [address]  ASSISTED-BSO    HYSTERECTOMY, VAGINAL N/A 2022    HYSTERECTOMY VAGINAL LAPAROSCOPIC ROBOTIC ASSISTED - BSO performed by Bhavna Claudio DO at [facility]    Dr. [facility] - vaginal wart removal    TUBAL LIGATION       Outpatient Encounter Medications as of 5/10/2022   Medication Sig Dispense Refill    nitrofurantoin (MACRODANTIN) 50 MG capsule Take 1 capsule by mouth daily 90 capsule 1    amLODIPine (NORVASC) 5 MG tablet TAKE 1 TABLET BY MOUTH ONCE A DAY 30 tablet 5    metFORMIN (GLUCOPHAGE) 1000 MG tablet Take 1 tablet by mouth 2 times daily (with meals) 60 tablet 6    SITagliptin (JANUVIA) 100 MG tablet Take 1 tablet by mouth daily 30 tablet 6    lisinopril (PRINIVIL;ZESTRIL) 10 MG tablet Take 1 tablet by mouth daily 30 tablet 6    ketorolac (TORADOL) 10 MG tablet Take 1 tablet by mouth every 6 hours as needed for Pain (Patient not taking: Reported on 3/1/2022) 20 tablet 0    ACCU-CHEK DARRELL PLUS strip USE TO TEXT BLOOD SUGAR DAILY AS NEEDED. 50 strip 5    TRUEplus Lancets 33G MISC USE ONCE A  each 5    [DISCONTINUED] atorvastatin (LIPITOR) 40 MG tablet TAKE 1 TABLET BY MOUTH ONCE A DAY 30 tablet 5    Blood Glucose Monitoring Suppl (ACCU-CHEK DARRELL PLUS) W/DEVICE KIT 1 kit by Does not apply route daily 1 kit 0     No facility-administered encounter medications on file as of 5/10/2022.       Current Outpatient Medications on File Prior to Visit   Medication Sig Dispense Refill    nitrofurantoin (MACRODANTIN) 50 MG capsule Take 1 capsule by mouth daily 90 capsule 1    amLODIPine (NORVASC) 5 MG tablet TAKE 1 TABLET BY MOUTH ONCE A DAY 30 tablet 5    metFORMIN (GLUCOPHAGE) 1000 MG tablet Take 1 tablet by mouth 2 times daily (with meals) 60 tablet 6    SITagliptin (JANUVIA) 100 MG tablet Take 1 tablet by mouth daily 30 tablet 6    lisinopril (PRINIVIL;ZESTRIL) 10 MG tablet Take 1 tablet by mouth daily 30 tablet 6    ketorolac (TORADOL) 10 MG tablet Take 1 tablet by mouth every 6 hours as needed for Pain (Patient not taking: Reported on 3/1/2022) 20 tablet 0    ACCU-CHEK DARRELL PLUS strip USE TO TEXT BLOOD SUGAR DAILY AS NEEDED. 50 strip 5    TRUEplus Lancets 33G MISC USE ONCE A  each 5    [DISCONTINUED] atorvastatin (LIPITOR) 40 MG tablet TAKE 1 TABLET BY MOUTH ONCE A DAY 30 tablet 5    Blood Glucose Monitoring Suppl (ACCU-CHEK DARRELL PLUS) W/DEVICE KIT 1 kit by Does not apply route daily 1 kit 0     No current facility-administered medications on file prior to visit. Cat hair extract, Dust mite extract, and Penicillins  Family History   Problem Relation Age of Onset    Heart Disease Paternal Grandfather 48         in his early 52's from heart or aneurysm    Diabetes Maternal Grandfather     Heart Attack Maternal Grandfather 80    Hypertension Father     Kidney Cancer Father 59    Lung Cancer Father 68         at age 68 from recurrent lung cancer    Heart Surgery Father     Heart Disease Father     Diabetes Mother     Hypertension Mother     Hypertension Sister     Mental Illness Brother         schizophrenia and bipolar disorder    Hypertension Brother      Social History     Tobacco Use   Smoking Status Never Smoker   Smokeless Tobacco Never Used       Social History     Substance and Sexual Activity   Alcohol Use No       Review of Systems   Constitutional: Negative for appetite change, chills and fever. Eyes: Negative for redness and visual disturbance. Respiratory: Negative for apnea, cough, shortness of breath and wheezing. Cardiovascular: Negative for chest pain and leg swelling. Gastrointestinal: Negative for abdominal pain, constipation, nausea and vomiting. Genitourinary: Negative for difficulty urinating, dyspareunia, dysuria, enuresis, flank pain, frequency, hematuria, pelvic pain, urgency, vaginal bleeding and vaginal discharge. Musculoskeletal: Negative for back pain, joint swelling and myalgias. Skin: Negative for color change, rash and wound. Neurological: Negative for dizziness, tremors and numbness. Hematological: Negative for adenopathy. Does not bruise/bleed easily. Psychiatric/Behavioral: Negative for sleep disturbance. /76   Pulse 80   Temp 98.2 °F (36.8 °C)   Resp 16   Ht 5' 2\" (1.575 m)   Wt 208 lb (94.3 kg)   LMP 12/27/2021   BMI 38.04 kg/m²       PHYSICAL EXAM:  Constitutional: Patient resting comfortably, in no acute distress. Neuro: Alert and oriented to person place and time. Psych: Mood and affect normal.  HEENT: normocephalic, atraumatic  Lungs: Respiratory effort normal, unlabored  Abdomen: Soft, non-tender, non-distended   : No CVA tenderness. Pelvic: deferred     Lab Results   Component Value Date    BUN 16 01/25/2022     Lab Results   Component Value Date    CREATININE 0.59 01/25/2022       ASSESSMENT:   Diagnosis Orders   1. Gross hematuria  Urinalysis with Microscopic    Culture, Urine   2. Frequent UTI     3. Frequency of urination  DE MEASUREMENT,POST-VOID RESIDUAL VOLUME BY US,NON-IMAGING       PLAN:  We will check a urinalysis and culture secondary to her history of gross hematuria. Continue Macrobid 50 mg daily for UTI prophylaxis. Follow-up in 6 months. Patient is going to think about whether or not she wants to stay on antibiotics.

## 2022-05-10 NOTE — PATIENT INSTRUCTIONS
SURVEY:    You may be receiving a survey from [employer] regarding your visit today. Please complete the survey to enable us to provide the highest quality of care to you and your family. If you cannot score us a very good on any question, please call the office to discuss how we could have made your experience a very good one. Thank you. Please recheck your blood pressure when you go home and make sure you take your prescribed medication if applicable . Please follow up with your PCP or ER if needed.

## 2022-05-11 LAB
CULTURE: NORMAL
SPECIMEN DESCRIPTION: NORMAL

## 2022-05-12 ENCOUNTER — TELEPHONE (OUTPATIENT)
Dept: UROLOGY | Age: 52
End: 2022-05-12

## 2022-05-12 NOTE — TELEPHONE ENCOUNTER
----- Message from BISHNU Baca - CNP sent at 5/12/2022  8:25 AM EDT -----  Call pt - urine cx reviewed and negative for UTI & for significant microhematuria

## 2022-05-14 ENCOUNTER — HOSPITAL ENCOUNTER (OUTPATIENT)
Age: 52
Discharge: HOME | End: 2022-05-14
Payer: COMMERCIAL

## 2022-05-14 DIAGNOSIS — I10 ESSENTIAL HYPERTENSION: ICD-10-CM

## 2022-05-14 DIAGNOSIS — E78.2 MIXED HYPERLIPIDEMIA: ICD-10-CM

## 2022-05-14 DIAGNOSIS — E11.9 TYPE 2 DIABETES MELLITUS NOT AT GOAL (HCC): ICD-10-CM

## 2022-05-14 LAB
ALT SERPL-CCNC: 142 U/L (ref 5–33)
ANION GAP SERPL CALCULATED.3IONS-SCNC: 10 MMOL/L (ref 9–17)
AST SERPL-CCNC: 130 U/L
BUN BLDV-MCNC: 11 MG/DL (ref 6–20)
BUN/CREAT BLD: 20 (ref 9–20)
CALCIUM SERPL-MCNC: 9.6 MG/DL (ref 8.6–10.4)
CHLORIDE BLD-SCNC: 102 MMOL/L (ref 98–107)
CHOLESTEROL/HDL RATIO: 4.4
CHOLESTEROL: 218 MG/DL
CO2: 27 MMOL/L (ref 20–31)
CREAT SERPL-MCNC: 0.54 MG/DL (ref 0.5–0.9)
CREATININE URINE: 297.9 MG/DL (ref 28–217)
GFR AFRICAN AMERICAN: >60 ML/MIN
GFR NON-AFRICAN AMERICAN: >60 ML/MIN
GFR SERPL CREATININE-BSD FRML MDRD: ABNORMAL ML/MIN/{1.73_M2}
GFR SERPL CREATININE-BSD FRML MDRD: ABNORMAL ML/MIN/{1.73_M2}
GLUCOSE BLD-MCNC: 205 MG/DL (ref 70–99)
HCT VFR BLD CALC: 42.7 % (ref 36.3–47.1)
HDLC SERPL-MCNC: 50 MG/DL
HEMOGLOBIN: 13.6 G/DL (ref 11.9–15.1)
LDL CHOLESTEROL: 135 MG/DL (ref 0–130)
MCH RBC QN AUTO: 28.6 PG (ref 25.2–33.5)
MCHC RBC AUTO-ENTMCNC: 31.9 G/DL (ref 28.4–34.8)
MCV RBC AUTO: 89.7 FL (ref 82.6–102.9)
MICROALBUMIN/CREAT 24H UR: 20 MG/L
MICROALBUMIN/CREAT UR-RTO: 7 MCG/MG CREAT
NRBC AUTOMATED: 0 PER 100 WBC
PDW BLD-RTO: 13 % (ref 11.8–14.4)
PLATELET # BLD: 281 K/UL (ref 138–453)
PMV BLD AUTO: 9.9 FL (ref 8.1–13.5)
POTASSIUM SERPL-SCNC: 4.4 MMOL/L (ref 3.7–5.3)
RBC # BLD: 4.76 M/UL (ref 3.95–5.11)
SODIUM BLD-SCNC: 139 MMOL/L (ref 135–144)
TRIGL SERPL-MCNC: 166 MG/DL
WBC # BLD: 10.9 K/UL (ref 3.5–11.3)

## 2022-05-14 PROCEDURE — 85027 COMPLETE CBC AUTOMATED: CPT

## 2022-05-14 PROCEDURE — 84460 ALANINE AMINO (ALT) (SGPT): CPT

## 2022-05-14 PROCEDURE — 80048 BASIC METABOLIC PNL TOTAL CA: CPT

## 2022-05-14 PROCEDURE — 82570 ASSAY OF URINE CREATININE: CPT

## 2022-05-14 PROCEDURE — 83036 HEMOGLOBIN GLYCOSYLATED A1C: CPT

## 2022-05-14 PROCEDURE — 36415 COLL VENOUS BLD VENIPUNCTURE: CPT

## 2022-05-14 PROCEDURE — 82043 UR ALBUMIN QUANTITATIVE: CPT

## 2022-05-14 PROCEDURE — 80061 LIPID PANEL: CPT

## 2022-05-14 PROCEDURE — 84450 TRANSFERASE (AST) (SGOT): CPT

## 2022-05-15 LAB
ESTIMATED AVERAGE GLUCOSE: 189 MG/DL
HBA1C MFR BLD: 8.2 % (ref 4–6)

## 2022-06-08 ENCOUNTER — HOSPITAL ENCOUNTER (OUTPATIENT)
Dept: WOMENS IMAGING | Age: 52
Discharge: HOME | End: 2022-06-10
Payer: COMMERCIAL

## 2022-06-08 DIAGNOSIS — Z12.31 BREAST CANCER SCREENING BY MAMMOGRAM: ICD-10-CM

## 2022-06-08 PROCEDURE — 77067 SCR MAMMO BI INCL CAD: CPT

## 2022-07-06 ENCOUNTER — OFFICE VISIT (OUTPATIENT)
Dept: GASTROENTEROLOGY | Age: 52
End: 2022-07-06
Payer: COMMERCIAL

## 2022-07-06 VITALS
SYSTOLIC BLOOD PRESSURE: 127 MMHG | HEART RATE: 86 BPM | HEIGHT: 62 IN | WEIGHT: 193.8 LBS | TEMPERATURE: 97.8 F | RESPIRATION RATE: 18 BRPM | DIASTOLIC BLOOD PRESSURE: 67 MMHG | BODY MASS INDEX: 35.66 KG/M2

## 2022-07-06 DIAGNOSIS — R74.8 ELEVATED LIVER ENZYMES: Primary | ICD-10-CM

## 2022-07-06 DIAGNOSIS — K76.0 FATTY LIVER: ICD-10-CM

## 2022-07-06 PROCEDURE — 99202 OFFICE O/P NEW SF 15 MIN: CPT | Performed by: INTERNAL MEDICINE

## 2022-07-06 NOTE — PATIENT INSTRUCTIONS
SURVEY:    You may be receiving a survey from [employer] regarding your visit today. Please complete the survey to enable us to provide the highest quality of care to you and your family. If you cannot score us a very good on any question, please call the office to discuss how we could have made your experience a very good one. Thank you.

## 2022-07-06 NOTE — PROGRESS NOTES
Chief Complaint   Patient presents with    New Patient     elevated LFT's-Dr. Dex Amaro has been wathching for over a year and wanted her to be evalutated         HPI  Ms. Mathew Tidwell is a 47year old woman with a history of hypertension, hyperlipidemia, diabetes mellitus II who presents for evaluation of elevated liver enzymes. She states that she was just started on a 3rd diabetes managing medications - Trulicity, due to an elevated hemoglobin A1c. She also reports that she had blood in her urine and saw a urologist who placed her on antibiotics - it has since subsided with no recurrence in the past 9 months. She denies any family history of liver disease other than alcoholic cirrhosis in her paternal family. She states that as a result, she does not drink so she can stay away from becoming dependent on alcohol. She states that pre-COVID, she was about 218 lbs which was her highest weight. She states that with Trulicity, she has lost about 10-12 pounds. She denies any changes to her eye color or urine colon. RUQ US: 11/18/2021  LIVER: Naiam Shall demonstrates increased echogenicity, and is approximately 21.1   cm in length.  No focal hepatic lesion was seen at real-time imaging by the   sonographer.       BILIARY SYSTEM:  Gallbladder is unremarkable without evidence of   pericholecystic fluid, wall thickening or stones.  Negative sonographic   Schaefer's sign.  Gallbladder wall is approximately 2 mm in thickness.       Common bile duct is within normal limits measuring 4 mm.       RIGHT KIDNEY: Right kidney is approximately 14.4 cm in length, without   hydronephrosis.       PANCREAS:  Visualized portions of the pancreas are unremarkable.       OTHER: No evidence of right upper quadrant ascites.      Colonoscopy 10/08/2021  Diverticulosis  No polyps  Return in 10 years          Past Medical History:   Diagnosis Date    Diabetes mellitus ([address]     [place] (hard of hearing)     PHI HEARING AIDS    Hyperlipidemia     Hypertension  PONV (postoperative nausea and vomiting)          Past Surgical History:   Procedure Laterality Date    APPENDECTOMY  1979     SECTION  2003    COLONOSCOPY  10/08/2021    [place] [address] AND CURETTAGE OF UTERUS N/A 2020    DILATATION AND CURETTAGE HYSTEROSCOPY CAUTERY Merlynn Flank performed by Kimberly Wu MD at [address] ([address] [facility] [address])  2022    VAGINAL LAPAROSCOPIC ROBOTOIC ASSISTED-BSO    HYSTERECTOMY, VAGINAL N/A 2022    HYSTERECTOMY VAGINAL LAPAROSCOPIC ROBOTIC ASSISTED - BSO performed by Taqueria Peguero DO at [facility] - vaginal wart removal    TUBAL LIGATION           Current Outpatient Medications   Medication Sig Dispense Refill    SITagliptin (JANUVIA) 100 MG tablet Take 1 tablet by mouth daily 30 tablet 6    Dulaglutide 1.5 MG/0.5ML SOPN Inject 1.5 mg into the skin once a week 12 pen 3    nitrofurantoin (MACRODANTIN) 50 MG capsule Take 1 capsule by mouth daily 90 capsule 1    amLODIPine (NORVASC) 5 MG tablet TAKE 1 TABLET BY MOUTH ONCE A DAY 30 tablet 5    metFORMIN (GLUCOPHAGE) 1000 MG tablet Take 1 tablet by mouth 2 times daily (with meals) 60 tablet 6    lisinopril (PRINIVIL;ZESTRIL) 10 MG tablet Take 1 tablet by mouth daily 30 tablet 6    ACCU-CHEK DARRELL PLUS strip USE TO TEXT BLOOD SUGAR DAILY AS NEEDED. 50 strip 5    TRUEplus Lancets 33G MISC USE ONCE A  each 5    Blood Glucose Monitoring Suppl (ACCU-CHEK DARRELL PLUS) W/DEVICE KIT 1 kit by Does not apply route daily 1 kit 0     No current facility-administered medications for this visit.          Family History   Problem Relation Age of Onset    Heart Disease Paternal Grandfather 48         in his early 52's from heart or aneurysm    Diabetes Maternal Grandfather     Heart Attack Maternal Grandfather 80    Hypertension Father     Kidney Cancer Father 59    Lung Cancer Father 68         at age 68 from recurrent lung cancer    Heart Surgery Father     Heart Disease Father     Diabetes Mother     Hypertension Mother     Hypertension Sister     Mental Illness Brother         schizophrenia and bipolar disorder    Hypertension Brother           Social Determinants of Health     Tobacco Use: Low Risk     Smoking Tobacco Use: Never Smoker    Smokeless Tobacco Use: Never Used   Alcohol Use:     Frequency of Alcohol Consumption: Not on file    Average Number of Drinks: Not on file    Frequency of Binge Drinking: Not on file   Financial Resource Strain: Low Risk     Difficulty of Paying Living Expenses: Not hard at all   Food Insecurity: No Food Insecurity    Worried About [facility] in the Last Year: Never true    [facility] in the Last Year: Never true   Transportation Needs:     Lack of Transportation (Medical): Not on file    Lack of Transportation (Non-Medical):  Not on file   Physical Activity:     Days of Exercise per Week: Not on file    Minutes of Exercise per Session: Not on file   Stress:     Feeling of Stress : Not on file   Social Connections:     Frequency of Communication with Friends and Family: Not on file    Frequency of Social Gatherings with Friends and Family: Not on file    Attends Gnosticist Services: Not on file    Active Member of [address] [place] [address] or Organizations: Not on file    Attends Club or Organization Meetings: Not on file    Marital Status: Not on file   Intimate Partner Violence:     Fear of Current or Ex-Partner: Not on file    Emotionally Abused: Not on file    Physically Abused: Not on file    Sexually Abused: Not on file   Depression: Not at risk    PHQ-2 Score: 0   Housing Stability:     Unable to Pay for Housing in the Last Year: Not on file    Number of [place] in the Last Year: Not on file    Unstable Housing in the Last Year: Not on file         /67 (Site: Right Upper Arm, Position: Sitting, Cuff Size: Medium Adult)   Pulse 86   Temp 97.8 °F (36.6 °C) (Temporal)   Resp 18   Ht 5' 2\" (1.575 m)   Wt 193 lb 12.8 oz (87.9 kg)   LMP 12/27/2021   BMI 35.45 kg/m²     Physical Exam  Constitutional:       Appearance: She is obese. HENT:      Head: Normocephalic. Nose: Nose normal.      Mouth/Throat:      Mouth: Mucous membranes are moist.   Eyes:      Pupils: Pupils are equal, round, and reactive to light. Cardiovascular:      Rate and Rhythm: Normal rate and regular rhythm. Pulses: Normal pulses. Heart sounds: Normal heart sounds. Pulmonary:      Effort: Pulmonary effort is normal.      Breath sounds: Normal breath sounds. Abdominal:      General: Bowel sounds are normal.      Palpations: Abdomen is soft. Comments: Periumbilical hernia noted. Musculoskeletal:         General: Normal range of motion. Cervical back: Normal range of motion and neck supple. Skin:     General: Skin is warm. Neurological:      General: No focal deficit present. Mental Status: She is alert and oriented to person, place, and time.    Psychiatric:         Mood and Affect: Mood normal.           Review of Systems   Cardiovascular:        Hypertension   Endocrine:        DM II  Hyperlipidemia          ent Ref Range & Units 5/14/22 0912 1/25/22 1514 11/18/21 1520 11/6/21 0946 5/6/21 0708 10/27/20 0717 8/14/20 0730   ALT 5 - 33 U/L 142 High   133 High   143 High   141 High   88 High   40 High   68 High         Component Ref Range & Units 5/14/22 0912 1/25/22 1514 11/18/21 1520 11/6/21 0946 5/6/21 0708 10/27/20 0717 8/14/20 0730   AST <32 U/L 130 High   90 High   107 High   111 High   76 High   30  51 High          Component Ref Range & Units 5/14/22 0912 1/25/22 1514 11/6/21 0946 5/6/21 0708 11/30/20 1108 10/27/20 0717 2/18/20 1125   WBC 3.5 - 11.3 k/uL 10.9  13.6 High   10.3  10.1  13.5 High   10.8  16.1 High     RBC 3.95 - 5.11 m/uL 4.76  4.54  4.68  4.66  4.39  4.17  3.59 Low     Hemoglobin 11.9 - 15.1 g/dL 13.6  12.9  13.2  12.9  12.0  11.4 Low   10.1 Low     Hematocrit 36.3 - 47.1 % 42.7  40.0  42.3  40.6  38.9  37.6  32.0 Low     MCV 82.6 - 102.9 fL 89.7  88.1  90.4  87.1  88.6  90.2  89.1    MCH 25.2 - 33.5 pg 28.6  28.4  28.2  27.7  27.3  27.3  28.1    MCHC 28.4 - 34.8 g/dL 31.9  32.3  31.2  31.8  30.8  30.3  31.6    RDW 11.8 - 14.4 % 13.0  12.9  13.2  14.3  13.5  14.0  13.6    Platelets 886 - 713 k/uL 281  315  307  263  346  321  358        Component Ref Range & Units 5/14/22 0912 1/25/22 1514 11/18/21 1520 11/6/21 0946 9/10/21 1446 5/6/21 0708 4/15/21 1542   Glucose 70 - 99 mg/dL 205 High   235 High   219 High   205 High    227 High      BUN 6 - 20 mg/dL 11  16  11  16   15  18    CREATININE 0.50 - 0.90 mg/dL 0.54  0.59  0.42 Low   0.48 Low   0.54  0.53  0.60    Bun/Cre Ratio 9 - 20 20  27 High   26 High   33 High    28 High      Calcium 8.6 - 10.4 mg/dL 9.6  10.0  9.4  9.1   9.1     Sodium 135 - 144 mmol/L 139  139  136  136   137     Potassium 3.7 - 5.3 mmol/L 4.4  4.4  3.7  4.3   4.2     Chloride 98 - 107 mmol/L 102  98  98  101   100     CO2 20 - 31 mmol/L 27 25 23 22   27     Anion Gap 9 - 17 mmol/L 10  16  15  13   10           Component Ref Range & Units 5/14/22 0912 1/25/22 1513 11/6/21 0946 5/6/21 0708 10/27/20 0717 7/28/20 0756 1/27/20 1611   Hemoglobin A1C 4.0 - 6.0 % 8.2 High   8.8 High   7.9 High   8.0 High   6.7 High   9.1 High  R  6.9 R            INR   Date Value Ref Range Status   02/16/2020 1.0 0.9 - 1.2 Final     Assessment & Plan      Ms. Dayami Easton is a 47year old woman with a history of hypertension, hyperlipidemia, diabetes mellitus II who presents for evaluation of elevated liver enzymes. Of note, her US from 05/2022 is suggestive of fatty liver - her platelets are normal. Given history of DM II and BMI greater than 35, there is a increased risk of fatty infiltration to the liver. Would rule out AIH, PBC, Jay's disease,  A-1-A, hemochromatosis, viral hepatitis. PLAN  1. Fatty liver  - Mitochondrial Antibodies, M2, IgG; Future  - Hemochromatosis Mutation; Future  - Liver-Kidney Microsome (LKM-1) Ab (IgG); Future  - Alpha-1-Antitrypsin w Phenotype; Future  - MICKEY; Future  - AFP Tumor Marker; Future  - Iron and TIBC; Future  - Hepatitis A Antibody, IgM; Future  - Hepatitis A Antibody, Total; Future  - Hepatitis B Core Antibody, IgM; Future  - Hepatitis B Core Antibody, Total; Future  - Hepatitis B Surface Antibody; Future  - Hepatitis C Antibody; Future  - Hepatitis B Surface Antigen; Future  - Tissue Transglutaminase Antobody IgA w Reflex; Future  - IgA; Future    2. Elevated liver enzymes  - Mitochondrial Antibodies, M2, IgG; Future  - Hemochromatosis Mutation; Future  - Liver-Kidney Microsome (LKM-1) Ab (IgG); Future  - Alpha-1-Antitrypsin w Phenotype; Future  - MICKEY; Future  - AFP Tumor Marker; Future  - Iron and TIBC; Future  - Hepatitis A Antibody, IgM; Future  - Hepatitis A Antibody, Total; Future  - Hepatitis B Core Antibody, IgM; Future  - Hepatitis B Core Antibody, Total; Future  - Hepatitis B Surface Antibody; Future  - Hepatitis C Antibody; Future  - Hepatitis B Surface Antigen; Future  - Tissue Transglutaminase Antobody IgA w Reflex; Future  - IgA; Future  - Ceruloplasmin    3. F/U in 4-6 weeks or sooner as neeeded  Informed consent was obtained with a discussion about potential risks and complications of the procedure. Patient verbalized understanding and willingness to continue with the procedure scheduling. Spent 20  minutes with the patient with greater than 50 percent of the time was spent on face-to-face time in discussion with the patient regarding diagnostic options/results, treatment options, counseling, and follow-up plan.       Chel Ramírez MD

## 2022-07-07 ENCOUNTER — HOSPITAL ENCOUNTER (OUTPATIENT)
Age: 52
Discharge: HOME | End: 2022-07-07
Payer: COMMERCIAL

## 2022-07-07 DIAGNOSIS — K76.0 FATTY LIVER: ICD-10-CM

## 2022-07-07 DIAGNOSIS — R74.8 ELEVATED LIVER ENZYMES: ICD-10-CM

## 2022-07-07 LAB
AFP: 3.4 UG/L
CERULOPLASMIN: 29 MG/DL (ref 16–45)
IRON SATURATION: 33 % (ref 20–55)
IRON: 102 UG/DL (ref 37–145)
TOTAL IRON BINDING CAPACITY: 305 UG/DL (ref 250–450)
UNSATURATED IRON BINDING CAPACITY: 203 UG/DL (ref 112–347)

## 2022-07-07 PROCEDURE — 86225 DNA ANTIBODY NATIVE: CPT

## 2022-07-07 PROCEDURE — 86704 HEP B CORE ANTIBODY TOTAL: CPT

## 2022-07-07 PROCEDURE — 82103 ALPHA-1-ANTITRYPSIN TOTAL: CPT

## 2022-07-07 PROCEDURE — 82105 ALPHA-FETOPROTEIN SERUM: CPT

## 2022-07-07 PROCEDURE — 36415 COLL VENOUS BLD VENIPUNCTURE: CPT

## 2022-07-07 PROCEDURE — 86708 HEPATITIS A ANTIBODY: CPT

## 2022-07-07 PROCEDURE — 86705 HEP B CORE ANTIBODY IGM: CPT

## 2022-07-07 PROCEDURE — 81256 HFE GENE: CPT

## 2022-07-07 PROCEDURE — 86709 HEPATITIS A IGM ANTIBODY: CPT

## 2022-07-07 PROCEDURE — 83550 IRON BINDING TEST: CPT

## 2022-07-07 PROCEDURE — 86803 HEPATITIS C AB TEST: CPT

## 2022-07-07 PROCEDURE — 87340 HEPATITIS B SURFACE AG IA: CPT

## 2022-07-07 PROCEDURE — 82390 ASSAY OF CERULOPLASMIN: CPT

## 2022-07-07 PROCEDURE — 83516 IMMUNOASSAY NONANTIBODY: CPT

## 2022-07-07 PROCEDURE — 82104 ALPHA-1-ANTITRYPSIN PHENO: CPT

## 2022-07-07 PROCEDURE — 86317 IMMUNOASSAY INFECTIOUS AGENT: CPT

## 2022-07-07 PROCEDURE — 86038 ANTINUCLEAR ANTIBODIES: CPT

## 2022-07-07 PROCEDURE — 86376 MICROSOMAL ANTIBODY EACH: CPT

## 2022-07-07 PROCEDURE — 83540 ASSAY OF IRON: CPT

## 2022-07-07 PROCEDURE — 82784 ASSAY IGA/IGD/IGG/IGM EACH: CPT

## 2022-07-08 LAB
ANTI DNA DOUBLE STRANDED: <0.5 IU/ML
ANTI-NUCLEAR ANTIBODY (ANA): NEGATIVE
ENA ANTIBODIES SCREEN: 0.3 U/ML
HAV AB SERPL IA-ACNC: NONREACTIVE
HEPATITIS B CORE TOTAL ANTIBODY: NONREACTIVE
MITOCHONDRIAL ANTIBODY: 1.1 U/ML (ref 0–4)
TISSUE TRANSGLUTAMINASE IGA: 1.4 U/ML

## 2022-07-09 LAB
HAV IGM SER IA-ACNC: NONREACTIVE
HBV SURFACE AB TITR SER: <3.5 MIU/ML
HEPATITIS B CORE IGM ANTIBODY: NONREACTIVE
HEPATITIS B SURFACE ANTIGEN: NONREACTIVE
HEPATITIS C ANTIBODY: NONREACTIVE
IGA: 343 MG/DL (ref 70–400)
LIVER-KIDNEY MICROSOMAL AB: NORMAL

## 2022-07-12 LAB
ALPHA-1 ANTITRYPSIN PHENOTYPE: NORMAL
ALPHA-1 ANTITRYPSIN: 145 MG/DL (ref 90–200)

## 2022-07-14 LAB
C282Y HEMOCHROMATOSIS MUT: NEGATIVE
H63D HEMOCHROMATOSIS MUT: NEGATIVE
HEMOCHROMATOSIS MUTATION INT: NORMAL
HEMOCHROMATOSIS SPECIMEN: NORMAL
S65C HEMOCHROMATOSIS MUT: NEGATIVE

## 2022-08-08 RX ORDER — NITROFURANTOIN MACROCRYSTALS 50 MG/1
50 CAPSULE ORAL DAILY
Qty: 90 CAPSULE | Refills: 1 | Status: SHIPPED | OUTPATIENT
Start: 2022-08-08

## 2022-08-13 ENCOUNTER — HOSPITAL ENCOUNTER (OUTPATIENT)
Age: 52
Discharge: HOME | End: 2022-08-13
Payer: COMMERCIAL

## 2022-08-13 DIAGNOSIS — E11.9 TYPE 2 DIABETES MELLITUS NOT AT GOAL (HCC): ICD-10-CM

## 2022-08-13 DIAGNOSIS — R79.89 ELEVATED LFTS: ICD-10-CM

## 2022-08-13 LAB
ALT SERPL-CCNC: 62 U/L (ref 5–33)
ANION GAP SERPL CALCULATED.3IONS-SCNC: 12 MMOL/L (ref 9–17)
AST SERPL-CCNC: 37 U/L
BUN BLDV-MCNC: 12 MG/DL (ref 6–20)
BUN/CREAT BLD: 26 (ref 9–20)
CALCIUM SERPL-MCNC: 9.9 MG/DL (ref 8.6–10.4)
CHLORIDE BLD-SCNC: 100 MMOL/L (ref 98–107)
CHOLESTEROL/HDL RATIO: 3.9
CHOLESTEROL: 229 MG/DL
CO2: 28 MMOL/L (ref 20–31)
CREAT SERPL-MCNC: 0.47 MG/DL (ref 0.5–0.9)
CREATININE URINE: 81.9 MG/DL (ref 28–217)
GFR AFRICAN AMERICAN: >60 ML/MIN
GFR NON-AFRICAN AMERICAN: >60 ML/MIN
GFR SERPL CREATININE-BSD FRML MDRD: ABNORMAL ML/MIN/{1.73_M2}
GFR SERPL CREATININE-BSD FRML MDRD: ABNORMAL ML/MIN/{1.73_M2}
GLUCOSE BLD-MCNC: 147 MG/DL (ref 70–99)
HCT VFR BLD CALC: 40.9 % (ref 36.3–47.1)
HDLC SERPL-MCNC: 58 MG/DL
HEMOGLOBIN: 12.7 G/DL (ref 11.9–15.1)
LDL CHOLESTEROL: 144 MG/DL (ref 0–130)
MCH RBC QN AUTO: 28.7 PG (ref 25.2–33.5)
MCHC RBC AUTO-ENTMCNC: 31.1 G/DL (ref 28.4–34.8)
MCV RBC AUTO: 92.3 FL (ref 82.6–102.9)
MICROALBUMIN/CREAT 24H UR: <12 MG/L
MICROALBUMIN/CREAT UR-RTO: NORMAL MCG/MG CREAT
NRBC AUTOMATED: 0 PER 100 WBC
PDW BLD-RTO: 13.7 % (ref 11.8–14.4)
PLATELET # BLD: 337 K/UL (ref 138–453)
PMV BLD AUTO: 9.8 FL (ref 8.1–13.5)
POTASSIUM SERPL-SCNC: 4.1 MMOL/L (ref 3.7–5.3)
RBC # BLD: 4.43 M/UL (ref 3.95–5.11)
SODIUM BLD-SCNC: 140 MMOL/L (ref 135–144)
TRIGL SERPL-MCNC: 133 MG/DL
WBC # BLD: 12.6 K/UL (ref 3.5–11.3)

## 2022-08-13 PROCEDURE — 80048 BASIC METABOLIC PNL TOTAL CA: CPT

## 2022-08-13 PROCEDURE — 83036 HEMOGLOBIN GLYCOSYLATED A1C: CPT

## 2022-08-13 PROCEDURE — 80061 LIPID PANEL: CPT

## 2022-08-13 PROCEDURE — 84450 TRANSFERASE (AST) (SGOT): CPT

## 2022-08-13 PROCEDURE — 84460 ALANINE AMINO (ALT) (SGPT): CPT

## 2022-08-13 PROCEDURE — 82043 UR ALBUMIN QUANTITATIVE: CPT

## 2022-08-13 PROCEDURE — 36415 COLL VENOUS BLD VENIPUNCTURE: CPT

## 2022-08-13 PROCEDURE — 85027 COMPLETE CBC AUTOMATED: CPT

## 2022-08-13 PROCEDURE — 82570 ASSAY OF URINE CREATININE: CPT

## 2022-08-14 LAB
ESTIMATED AVERAGE GLUCOSE: 137 MG/DL
HBA1C MFR BLD: 6.4 % (ref 4–6)

## 2022-08-16 ENCOUNTER — OFFICE VISIT (OUTPATIENT)
Dept: GASTROENTEROLOGY | Age: 52
End: 2022-08-16
Payer: COMMERCIAL

## 2022-08-16 VITALS
SYSTOLIC BLOOD PRESSURE: 137 MMHG | TEMPERATURE: 98.6 F | WEIGHT: 193.4 LBS | DIASTOLIC BLOOD PRESSURE: 82 MMHG | HEART RATE: 85 BPM | RESPIRATION RATE: 18 BRPM | HEIGHT: 62 IN | BODY MASS INDEX: 35.59 KG/M2

## 2022-08-16 DIAGNOSIS — K76.0 FATTY LIVER: Primary | ICD-10-CM

## 2022-08-16 PROCEDURE — 99213 OFFICE O/P EST LOW 20 MIN: CPT | Performed by: INTERNAL MEDICINE

## 2022-08-16 NOTE — PROGRESS NOTES
[facility]  [address]  [place] [address]  Dept: [phone]    Chief Complaint   Patient presents with    Follow-up     F/u elevated liver enz.-had labs completed-recently at Dr. Martha Virk office and labs have improved        HPI  from 07/06/2022  Ms. Arlen Bishop is a 47year old woman with a history of hypertension, hyperlipidemia, diabetes mellitus II who presents for evaluation of elevated liver enzymes. She states that she was just started on a 3rd diabetes managing medications - Trulicity, due to an elevated hemoglobin A1c. She also reports that she had blood in her urine and saw a urologist who placed her on antibiotics - it has since subsided with no recurrence in the past 9 months. She denies any family history of liver disease other than alcoholic cirrhosis in her paternal family. She states that as a result, she does not drink so she can stay away from becoming dependent on alcohol. She states that pre-COVID, she was about 218 lbs which was her highest weight. She states that with Trulicity, she has lost about 10-12 pounds. She denies any changes to her eye color or urine colon. RUQ US: 11/18/2021  LIVER:  Liver demonstrates increased echogenicity, and is approximately 21.1   cm in length. No focal hepatic lesion was seen at real-time imaging by the   sonographer. BILIARY SYSTEM:  Gallbladder is unremarkable without evidence of   pericholecystic fluid, wall thickening or stones. Negative sonographic   Schaefer's sign. Gallbladder wall is approximately 2 mm in thickness. Common bile duct is within normal limits measuring 4 mm. RIGHT KIDNEY: Right kidney is approximately 14.4 cm in length, without   hydronephrosis. PANCREAS:  Visualized portions of the pancreas are unremarkable. OTHER: No evidence of right upper quadrant ascites.       Colonoscopy 10/08/2021  Diverticulosis  No polyps  Return in 10 years    Interval history: 2022  States that her PCP has since repeated her liver enzyme labs and the result shows a drop since she started taking Trulicity she states that overall she has lost about 60 pounds in the past 6 months due to appetite suppression with the Trulicity. Past Medical History:   Diagnosis Date    Diabetes mellitus ([address]     [place] (hard of hearing)     PHI HEARING AIDS    Hyperlipidemia     Hypertension     PONV (postoperative nausea and vomiting)         Past Surgical History:   Procedure Laterality Date    APPENDECTOMY  1979     SECTION  2003    COLONOSCOPY  10/08/2021    [place] [facility] AND CURETTAGE OF UTERUS N/A 2020    DILATATION AND CURETTAGE HYSTEROSCOPY CAUTERY Tomargarito Fraser performed by Lindsey Stewart MD at [facility] ([address] [place] [address])  2022    VAGINAL LAPAROSCOPIC ROBOTOIC ASSISTED-BSO    HYSTERECTOMY, VAGINAL N/A 2022    HYSTERECTOMY VAGINAL LAPAROSCOPIC ROBOTIC ASSISTED - BSO performed by Annie Almanzar DO at [facility]    Dr. Kamlesh Giron - vaginal wart removal    TUBAL LIGATION  2004        Family History   Problem Relation Age of Onset    Heart Disease Paternal Grandfather 48         in his early 52's from heart or aneurysm    Diabetes Maternal Grandfather     Heart Attack Maternal Grandfather 80    Hypertension Father     Kidney Cancer Father 59    Lung Cancer Father 68         at age 68 from recurrent lung cancer    Heart Surgery Father     Heart Disease Father     Diabetes Mother     Hypertension Mother     Hypertension Sister     Mental Illness Brother         schizophrenia and bipolar disorder    Hypertension Brother         Review of Systems   Constitutional:  Positive for appetite change. Appetite change since she started taking Trulicity.     Endocrine:        DM II      /82 (Site: Left Upper Arm, Position: Sitting, Cuff Size: Medium Adult)   Pulse 85   Temp 98.6 °F (37 °C) (Temporal)   Resp 18   Ht 5' 2\" (1.575 m)   Wt 193 lb 6.4 oz (87.7 kg)   LMP 12/27/2021   BMI 35.37 kg/m²      Physical Exam  Constitutional:       Appearance: Normal appearance. She is obese. HENT:      Head: Normocephalic and atraumatic. Nose: Nose normal.      Mouth/Throat:      Mouth: Mucous membranes are moist.   Eyes:      Extraocular Movements: Extraocular movements intact. Pupils: Pupils are equal, round, and reactive to light. Cardiovascular:      Rate and Rhythm: Normal rate and regular rhythm. Pulses: Normal pulses. Heart sounds: Normal heart sounds. Pulmonary:      Effort: Pulmonary effort is normal.      Breath sounds: Normal breath sounds. Abdominal:      General: Bowel sounds are normal. There is no distension. Palpations: Abdomen is soft. Comments: Ventral abdominal wall hernia   Musculoskeletal:         General: Normal range of motion. Cervical back: Normal range of motion. Skin:     General: Skin is warm. Neurological:      General: No focal deficit present. Mental Status: She is alert.    Psychiatric:         Mood and Affect: Mood normal.        Lab Results   Component Value Date    WBC 12.6 (H) 08/13/2022    HGB 12.7 08/13/2022    HCT 40.9 08/13/2022    MCV 92.3 08/13/2022     08/13/2022       Lab Results   Component Value Date     08/13/2022    K 4.1 08/13/2022     08/13/2022    CO2 28 08/13/2022    BUN 12 08/13/2022    CREATININE 0.47 (L) 08/13/2022    GLUCOSE 147 (H) 08/13/2022    CALCIUM 9.9 08/13/2022    PROT 7.4 01/25/2022    LABALBU 4.4 01/25/2022    BILITOT 0.48 01/25/2022    ALKPHOS 107 (H) 01/25/2022    AST 37 (H) 08/13/2022    ALT 62 (H) 08/13/2022    LABGLOM >60 08/13/2022    GFRAA >60 08/13/2022       Assessment:  Ms. Beryle Heinz is a 47year old woman with a history of hypertension, hyperlipidemia, diabetes

## 2022-08-26 DIAGNOSIS — K76.0 FATTY LIVER: Primary | ICD-10-CM

## 2022-08-26 NOTE — PROGRESS NOTES
[facility] [place] [facility] called and stated in order to do the elastography originally ordered by Dr Deanna Garrett and [address] Floor of liver needed added.

## 2022-09-06 ENCOUNTER — OFFICE VISIT (OUTPATIENT)
Dept: OBGYN | Age: 52
End: 2022-09-06
Payer: COMMERCIAL

## 2022-09-06 VITALS
BODY MASS INDEX: 34.78 KG/M2 | SYSTOLIC BLOOD PRESSURE: 118 MMHG | HEIGHT: 62 IN | DIASTOLIC BLOOD PRESSURE: 70 MMHG | WEIGHT: 189 LBS

## 2022-09-06 DIAGNOSIS — Z01.419 WOMEN'S ANNUAL ROUTINE GYNECOLOGICAL EXAMINATION: Primary | ICD-10-CM

## 2022-09-06 PROCEDURE — 99396 PREV VISIT EST AGE 40-64: CPT | Performed by: OBSTETRICS & GYNECOLOGY

## 2022-09-06 ASSESSMENT — ENCOUNTER SYMPTOMS
CONSTIPATION: 0
DIARRHEA: 0
SHORTNESS OF BREATH: 0
ABDOMINAL PAIN: 0

## 2022-09-06 NOTE — PROGRESS NOTES
YEARLY PHYSICAL    Date of service: 2022    Fredy Casanova  Is a 47 y.o.   female    PT's PCP is: Landy Llanes MD     : 1970                                             Subjective:       Patient's last menstrual period was 2021. Are your menses regular: not applicable    OB History    Para Term  AB Living   4 4 4     4   SAB IAB Ectopic Molar Multiple Live Births                    # Outcome Date GA Lbr Yeyo/2nd Weight Sex Delivery Anes PTL Lv   4 Term      CS-Classical      3 Term      Vag-Spont      2 Term      Vag-Spont      1 Term      Vag-Spont           Social History     Tobacco Use   Smoking Status Never   Smokeless Tobacco Never        Social History     Substance and Sexual Activity   Alcohol Use No       Family History   Problem Relation Age of Onset    Heart Disease Paternal Grandfather 48         in his early 52's from heart or aneurysm    Diabetes Maternal Grandfather     Heart Attack Maternal Grandfather 80    Hypertension Father     Kidney Cancer Father 59    Lung Cancer Father 68         at age 68 from recurrent lung cancer    Heart Surgery Father     Heart Disease Father     Diabetes Mother     Hypertension Mother     Hypertension Sister     Mental Illness Brother         schizophrenia and bipolar disorder    Hypertension Brother        Allergies: Cat hair extract, Dust mite extract, and Penicillins      Current Outpatient Medications:     nitrofurantoin (MACRODANTIN) 50 MG capsule, Take 1 capsule by mouth in the morning., Disp: 90 capsule, Rfl: 1    metFORMIN (GLUCOPHAGE) 1000 MG tablet, Take 1 tablet by mouth in the morning and 1 tablet in the evening. Take with meals. , Disp: 60 tablet, Rfl: 6    lisinopril (PRINIVIL;ZESTRIL) 10 MG tablet, Take 1 tablet by mouth in the morning., Disp: 30 tablet, Rfl: 6    amLODIPine (NORVASC) 5 MG tablet, TAKE 1 TABLET BY MOUTH ONCE A DAY, Disp: 30 tablet, Rfl: 6    SITagliptin (JANUVIA) 100 MG tablet, Take 1 tablet by mouth daily, Disp: 30 tablet, Rfl: 6    Dulaglutide 1.5 MG/0.5ML SOPN, Inject 1.5 mg into the skin once a week, Disp: 12 pen, Rfl: 3    ACCU-CHEK DARRELL PLUS strip, USE TO TEXT BLOOD SUGAR DAILY AS NEEDED., Disp: 50 strip, Rfl: 5    TRUEplus Lancets 33G MISC, USE ONCE A DAY, Disp: 100 each, Rfl: 5    Blood Glucose Monitoring Suppl (ACCU-CHEK DARRELL PLUS) W/DEVICE KIT, 1 kit by Does not apply route daily, Disp: 1 kit, Rfl: 0    Social History     Substance and Sexual Activity   Sexual Activity Yes    Partners: Male    Birth control/protection: Surgical       Any bleeding or pain with intercourse: No    Last Yearly:  21    Last pap: 21    Last HPV: 21    Last Mammogram: 21    Last Dexascan never    Do you do self breast exams: No    Past Medical History:   Diagnosis Date    Diabetes mellitus ([address]     [place] (hard of hearing)     PHI HEARING AIDS    Hyperlipidemia     Hypertension     PONV (postoperative nausea and vomiting)        Past Surgical History:   Procedure Laterality Date    APPENDECTOMY  1979     SECTION  2003    COLONOSCOPY  10/08/2021    [place]-Dr. Iven Moritz    DILATION AND CURETTAGE OF UTERUS N/A 2020    DILATATION AND CURETTAGE HYSTEROSCOPY CAUTERY Dominik Figueroa performed by Emmanuel Parker MD at [facility] ([address] [place] [address])  2022    VAGINAL LAPAROSCOPIC ROBOTOIC ASSISTED-BSO    HYSTERECTOMY, VAGINAL N/A 2022    HYSTERECTOMY VAGINAL LAPAROSCOPIC ROBOTIC ASSISTED - BSO performed by Albaro Thomas DO at [facility]    Dr. Jaxson Newby - vaginal wart removal    TUBAL LIGATION         Family History   Problem Relation Age of Onset    Heart Disease Paternal Grandfather 48         in his early 52's from heart or aneurysm    Diabetes Maternal Grandfather     Heart Attack Maternal Grandfather 80    Hypertension Father     Kidney Cancer Father 59    Lung Cancer Father 68         at age 68 from recurrent lung cancer    Heart Surgery Father     Heart Disease Father     Diabetes Mother     Hypertension Mother     Hypertension Sister     Mental Illness Brother         schizophrenia and bipolar disorder    Hypertension Brother        Any family history of breast or ovarian cancer: No    Any family history of blood clots: No      Chief Complaint   Patient presents with    Gynecologic Exam     Pt presents today for yearly pelvic and breast exam. Pt last pap was 21. Review of Systems   Constitutional:  Negative for chills, fatigue and fever. Respiratory:  Negative for shortness of breath. Cardiovascular:  Negative for chest pain. Gastrointestinal:  Negative for abdominal pain, constipation and diarrhea. Genitourinary:  Negative for dysuria, enuresis, frequency, menstrual problem, pelvic pain, urgency and vaginal bleeding. Neurological:  Negative for dizziness, light-headedness and headaches. Physical Exam  Constitutional:       Appearance: Normal appearance. Genitourinary:      Vulva, bladder and urethral meatus normal.      Right Labia: No rash or lesions. Left Labia: No lesions or rash. No labial fusion noted. No vaginal discharge. No vaginal prolapse present. No vaginal atrophy present. Right Adnexa: absent. Left Adnexa: absent. Cervix is absent. No cervical motion tenderness. No parametrium nodularity present. Uterus is absent. Breasts:     Breasts are soft. Right: No inverted nipple, mass, nipple discharge, skin change or tenderness. Left: No inverted nipple, mass, nipple discharge, skin change or tenderness. HENT:      Head: Normocephalic and atraumatic. Eyes:      Extraocular Movements: Extraocular movements intact. Pupils: Pupils are equal, round, and reactive to light. Cardiovascular:      Rate and Rhythm: Normal rate. Pulmonary:      Effort: Pulmonary effort is normal.   Abdominal:      General: There is no distension. Palpations: Abdomen is soft. There is no mass. Tenderness: There is no abdominal tenderness. Musculoskeletal:         General: Normal range of motion. Cervical back: Normal range of motion. Neurological:      General: No focal deficit present. Mental Status: She is alert and oriented to person, place, and time. Skin:     General: Skin is warm and dry. Psychiatric:         Mood and Affect: Mood normal.         Behavior: Behavior normal.         Thought Content: Thought content normal.         Judgment: Judgment normal.                               Assessment and Plan          Diagnosis Orders   1. Women's annual routine gynecological examination                  I am having Mariaa Maurisio. Vale Hernandez maintain her Accu-Chek [place] Plus, TRUEplus Lancets 33G, Accu-Chek [place] Plus, Dulaglutide, SITagliptin, metFORMIN, lisinopril, amLODIPine, and nitrofurantoin. Return in about 1 year (around 9/6/2023) for annual.    She was also counseled on her preventative health maintenance recommendations and follow-up. There are no Patient Instructions on file for this visit.     James Walter DO,9/6/2022 2:30 PM                 Nurse: LMD

## 2022-09-19 ENCOUNTER — TELEPHONE (OUTPATIENT)
Dept: GASTROENTEROLOGY | Age: 52
End: 2022-09-19

## 2022-09-19 DIAGNOSIS — R93.5 ABNORMAL FINDINGS ON DIAGNOSTIC IMAGING OF ABDOMEN: Primary | ICD-10-CM

## 2022-09-19 NOTE — TELEPHONE ENCOUNTER
Corrinne Distel, MD  You 13 minutes ago (4:33 PM)     VC  Please let her know she will need an MRI of the abdomen due to the radiologists stating that something on her liver appeared irregular and we need a better look.

## 2022-10-04 ENCOUNTER — HOSPITAL ENCOUNTER (OUTPATIENT)
Age: 52
Discharge: HOME | End: 2022-10-04
Payer: COMMERCIAL

## 2022-10-04 ENCOUNTER — HOSPITAL ENCOUNTER (OUTPATIENT)
Dept: MRI IMAGING | Age: 52
Discharge: HOME | End: 2022-10-06
Payer: COMMERCIAL

## 2022-10-04 DIAGNOSIS — R93.5 ABNORMAL FINDINGS ON DIAGNOSTIC IMAGING OF ABDOMEN: ICD-10-CM

## 2022-10-04 LAB
CREAT SERPL-MCNC: 0.46 MG/DL (ref 0.5–0.9)
GFR SERPL CREATININE-BSD FRML MDRD: >60 ML/MIN/1.73M2

## 2022-10-04 PROCEDURE — 36415 COLL VENOUS BLD VENIPUNCTURE: CPT

## 2022-10-04 PROCEDURE — A9579 GAD-BASE MR CONTRAST NOS,1ML: HCPCS | Performed by: INTERNAL MEDICINE

## 2022-10-04 PROCEDURE — 82565 ASSAY OF CREATININE: CPT

## 2022-10-04 PROCEDURE — 6360000004 HC RX CONTRAST MEDICATION: Performed by: INTERNAL MEDICINE

## 2022-10-04 PROCEDURE — 74183 MRI ABD W/O CNTR FLWD CNTR: CPT

## 2022-10-04 RX ADMIN — GADOTERIDOL 17 ML: 279.3 INJECTION, SOLUTION INTRAVENOUS at 11:29

## 2022-10-05 ENCOUNTER — TELEPHONE (OUTPATIENT)
Dept: GASTROENTEROLOGY | Age: 52
End: 2022-10-05

## 2022-10-05 NOTE — TELEPHONE ENCOUNTER
Pt called regarding her MRI. Pt and  are very concerned and would like to know the results as soon as possible. Please advise.

## 2022-11-10 ENCOUNTER — OFFICE VISIT (OUTPATIENT)
Dept: UROLOGY | Age: 52
End: 2022-11-10
Payer: COMMERCIAL

## 2022-11-10 VITALS
WEIGHT: 188 LBS | DIASTOLIC BLOOD PRESSURE: 84 MMHG | BODY MASS INDEX: 34.39 KG/M2 | SYSTOLIC BLOOD PRESSURE: 133 MMHG | TEMPERATURE: 97.7 F | HEART RATE: 83 BPM

## 2022-11-10 DIAGNOSIS — R31.0 GROSS HEMATURIA: ICD-10-CM

## 2022-11-10 DIAGNOSIS — N95.2 VAGINAL ATROPHY: ICD-10-CM

## 2022-11-10 DIAGNOSIS — N39.0 FREQUENT UTI: Primary | ICD-10-CM

## 2022-11-10 PROCEDURE — 99214 OFFICE O/P EST MOD 30 MIN: CPT | Performed by: NURSE PRACTITIONER

## 2022-11-10 PROCEDURE — 51798 US URINE CAPACITY MEASURE: CPT | Performed by: NURSE PRACTITIONER

## 2022-11-10 RX ORDER — NITROFURANTOIN MACROCRYSTALS 50 MG/1
50 CAPSULE ORAL DAILY
Qty: 90 CAPSULE | Refills: 1 | Status: SHIPPED | OUTPATIENT
Start: 2022-11-10

## 2022-11-10 RX ORDER — ESTRADIOL 0.1 MG/G
CREAM VAGINAL
Qty: 42.5 G | Refills: 3 | Status: SHIPPED | OUTPATIENT
Start: 2022-11-10

## 2022-11-10 ASSESSMENT — ENCOUNTER SYMPTOMS
ABDOMINAL PAIN: 0
BACK PAIN: 0
APNEA: 0
NAUSEA: 0
WHEEZING: 0
CONSTIPATION: 0
COUGH: 0
SHORTNESS OF BREATH: 0
VOMITING: 0
EYE REDNESS: 0
COLOR CHANGE: 0

## 2022-11-10 NOTE — PROGRESS NOTES
HPI:        Patient is a 47 y.o. female in no acute distress. She is alert and oriented to person, place, and time. History  2021 Referral from Dr. Juwan Hawthorne for gross hematuria. She has been having intermittent gross hematuria since her ablation in 2020. This has not been associated with flank or abdominal pain. She was never a smoker. She works at [employer]. She denies any history of stones. She does have baseline frequency every 1-2 hours, urge incontinence, and nocturia 2-3 times per night. PVR 96 mL. This is secondary to diabetes. Hgb A1c from 10/2020 was 6.7. She has never seen urology in the past.     CT urogram was negative for  abnormalities. Cystoscopy and pelvic showed normal anatomy    Frequent UTI  2021 - group beta strep, e.coli  2021 - group beta strep  2021 - group beta strep  2020 - e.coli    2021 -started on Macrobid 50 mg for UTI prophylaxis    2022 [facility]. 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 Adenomyosis. Serosal adhesions. Cervix without specific histologic abnormality. Bilateral and fallopian tubes no specific histologic abnormality. Today  Here today to follow-up for frequent urinary tract infections and history of hematuria. She has not had a urinary tract infection since 2021. She is having a daily bowel movement. She denies any dysuria or gross hematuria. She denies any frequency, urgency or incontinence. She is taking daily Macrobid. UA in 2022 was negative for significant microscopic hematuria.     Past Medical History:   Diagnosis Date    Diabetes mellitus ([address]     [place] (hard of hearing)     PHI HEARING AIDS    Hyperlipidemia     Hypertension     PONV (postoperative nausea and vomiting)      Past Surgical History:   Procedure Laterality Date    APPENDECTOMY  1979     SECTION  2003    COLONOSCOPY  10/08/2021    [place]-Dr. Zayra Edward    DILATION AND CURETTAGE OF UTERUS N/A 2020 DILATATION AND CURETTAGE HYSTEROSCOPY CAUTERY Carrington Rider performed by Jo-Ann Parks MD at [facility] ([address] [facility])  01/31/2022    VAGINAL LAPAROSCOPIC ROBOTOIC ASSISTED-BSO    HYSTERECTOMY, VAGINAL N/A 01/31/2022    HYSTERECTOMY VAGINAL LAPAROSCOPIC ROBOTIC ASSISTED - BSO performed by Diane Stallings DO at [facility]    Dr. Gómez Bryson - vaginal wart removal    TUBAL LIGATION  2004     Outpatient Encounter Medications as of 11/10/2022   Medication Sig Dispense Refill    nitrofurantoin (MACRODANTIN) 50 MG capsule Take 1 capsule by mouth in the morning. 90 capsule 1    metFORMIN (GLUCOPHAGE) 1000 MG tablet Take 1 tablet by mouth in the morning and 1 tablet in the evening. Take with meals. 60 tablet 6    lisinopril (PRINIVIL;ZESTRIL) 10 MG tablet Take 1 tablet by mouth in the morning. 30 tablet 6    amLODIPine (NORVASC) 5 MG tablet TAKE 1 TABLET BY MOUTH ONCE A DAY 30 tablet 6    SITagliptin (JANUVIA) 100 MG tablet Take 1 tablet by mouth daily 30 tablet 6    Dulaglutide 1.5 MG/0.5ML SOPN Inject 1.5 mg into the skin once a week 12 pen 3    ACCU-CHEK DARRELL PLUS strip USE TO TEXT BLOOD SUGAR DAILY AS NEEDED. 50 strip 5    TRUEplus Lancets 33G MISC USE ONCE A  each 5    Blood Glucose Monitoring Suppl (ACCU-CHEK DARRELL PLUS) W/DEVICE KIT 1 kit by Does not apply route daily 1 kit 0    [DISCONTINUED] atorvastatin (LIPITOR) 40 MG tablet TAKE 1 TABLET BY MOUTH ONCE A DAY 30 tablet 5     No facility-administered encounter medications on file as of 11/10/2022. Current Outpatient Medications on File Prior to Visit   Medication Sig Dispense Refill    nitrofurantoin (MACRODANTIN) 50 MG capsule Take 1 capsule by mouth in the morning. 90 capsule 1    metFORMIN (GLUCOPHAGE) 1000 MG tablet Take 1 tablet by mouth in the morning and 1 tablet in the evening. Take with meals.  60 tablet 6    lisinopril (PRINIVIL;ZESTRIL) 10 MG tablet Take 1 tablet by mouth in the morning. 30 tablet 6    amLODIPine (NORVASC) 5 MG tablet TAKE 1 TABLET BY MOUTH ONCE A DAY 30 tablet 6    SITagliptin (JANUVIA) 100 MG tablet Take 1 tablet by mouth daily 30 tablet 6    Dulaglutide 1.5 MG/0.5ML SOPN Inject 1.5 mg into the skin once a week 12 pen 3    ACCU-CHEK DARRELL PLUS strip USE TO TEXT BLOOD SUGAR DAILY AS NEEDED. 50 strip 5    TRUEplus Lancets 33G MISC USE ONCE A  each 5    Blood Glucose Monitoring Suppl (ACCU-CHEK DARRELL PLUS) W/DEVICE KIT 1 kit by Does not apply route daily 1 kit 0    [DISCONTINUED] atorvastatin (LIPITOR) 40 MG tablet TAKE 1 TABLET BY MOUTH ONCE A DAY 30 tablet 5     No current facility-administered medications on file prior to visit. Cat hair extract, Dust mite extract, and Penicillins  Family History   Problem Relation Age of Onset    Heart Disease Paternal Grandfather 48         in his early 52's from heart or aneurysm    Diabetes Maternal Grandfather     Heart Attack Maternal Grandfather 80    Hypertension Father     Kidney Cancer Father 59    Lung Cancer Father 68         at age 68 from recurrent lung cancer    Heart Surgery Father     Heart Disease Father     Diabetes Mother     Hypertension Mother     Hypertension Sister     Mental Illness Brother         schizophrenia and bipolar disorder    Hypertension Brother      Social History     Tobacco Use   Smoking Status Never   Smokeless Tobacco Never       Social History     Substance and Sexual Activity   Alcohol Use No       Review of Systems   Constitutional:  Negative for appetite change, chills and fever. Eyes:  Negative for redness and visual disturbance. Respiratory:  Negative for apnea, cough, shortness of breath and wheezing. Cardiovascular:  Negative for chest pain and leg swelling. Gastrointestinal:  Negative for abdominal pain, constipation, nausea and vomiting.    Genitourinary:  Negative for difficulty urinating, dyspareunia, dysuria, enuresis, flank pain, frequency, hematuria, pelvic pain, urgency, vaginal bleeding and vaginal discharge. Musculoskeletal:  Negative for back pain, joint swelling and myalgias. Skin:  Negative for color change, rash and wound. Neurological:  Negative for dizziness, tremors and numbness. Hematological:  Negative for adenopathy. Does not bruise/bleed easily. Psychiatric/Behavioral:  Negative for sleep disturbance. /84 (Site: Right Upper Arm, Position: Sitting, Cuff Size: Medium Adult)   Pulse 83   Temp 97.7 °F (36.5 °C)   Wt 188 lb (85.3 kg)   LMP 12/27/2021   BMI 34.39 kg/m²       PHYSICAL EXAM:  Constitutional: Patient resting comfortably, in no acute distress. Neuro: Alert and oriented to person place and time. Cranial nerves grossly intact. Psych: Mood and affect normal.  Skin: Warm, dry  HEENT: normocephalic, atraumatic  Lymphatics: No palpable lymphadenopathy  Lungs: Respiratory effort normal, unlabored  Cardiovascular:  Normal peripheral pulses  Abdomen: Soft, non-tender, non-distended with no organomegaly or palpable masses. : No CVA tenderness. Bladder non-tender and not distended. Lab Results   Component Value Date    BUN 12 08/13/2022     Lab Results   Component Value Date    CREATININE 0.46 (L) 10/04/2022       ASSESSMENT:   Diagnosis Orders   1. Frequent UTI  OH MEASUREMENT,POST-VOID RESIDUAL VOLUME BY US,NON-IMAGING      2. Gross hematuria  OH MEASUREMENT,POST-VOID RESIDUAL VOLUME BY US,NON-IMAGING      3. Vaginal atrophy  OH MEASUREMENT,POST-VOID RESIDUAL VOLUME BY US,NON-IMAGING              PLAN:  Start vaginal estrogen. Place a pea-sized amount vaginally nightly for one month, then use 3 nights per week thereafter. Do NOT use plastic applicator. F/U in 6 months.  If no UTIs, will likely stop daily macrobid

## 2022-11-10 NOTE — PATIENT INSTRUCTIONS
Place a pea-sized amount vaginally nightly for one month, then use 3 nights per week thereafter. Do NOT use plastic applicator.

## 2023-02-02 RX ORDER — NITROFURANTOIN MACROCRYSTALS 50 MG/1
50 CAPSULE ORAL DAILY
Qty: 90 CAPSULE | Refills: 1 | Status: SHIPPED | OUTPATIENT
Start: 2023-02-02

## 2023-02-04 ENCOUNTER — HOSPITAL ENCOUNTER (OUTPATIENT)
Age: 53
Discharge: HOME | End: 2023-02-04
Payer: COMMERCIAL

## 2023-02-04 DIAGNOSIS — E11.9 CONTROLLED TYPE 2 DIABETES MELLITUS WITHOUT COMPLICATION, WITHOUT LONG-TERM CURRENT USE OF INSULIN (HCC): ICD-10-CM

## 2023-02-04 LAB
ABSOLUTE EOS #: 0.24 K/UL (ref 0–0.44)
ABSOLUTE IMMATURE GRANULOCYTE: 0.03 K/UL (ref 0–0.3)
ABSOLUTE LYMPH #: 2.62 K/UL (ref 1.1–3.7)
ABSOLUTE MONO #: 0.8 K/UL (ref 0.1–1.2)
ALBUMIN SERPL-MCNC: 4.2 G/DL (ref 3.5–5.2)
ALBUMIN/GLOBULIN RATIO: 1.2 (ref 1–2.5)
ALP SERPL-CCNC: 102 U/L (ref 35–104)
ALT SERPL-CCNC: 22 U/L (ref 5–33)
ANION GAP SERPL CALCULATED.3IONS-SCNC: 11 MMOL/L (ref 9–17)
AST SERPL-CCNC: 19 U/L
BASOPHILS # BLD: 1 % (ref 0–2)
BASOPHILS ABSOLUTE: 0.09 K/UL (ref 0–0.2)
BILIRUB SERPL-MCNC: 0.3 MG/DL (ref 0.3–1.2)
BUN SERPL-MCNC: 12 MG/DL (ref 6–20)
BUN/CREAT BLD: 20 (ref 9–20)
CALCIUM SERPL-MCNC: 9.7 MG/DL (ref 8.6–10.4)
CHLORIDE SERPL-SCNC: 101 MMOL/L (ref 98–107)
CHOLEST SERPL-MCNC: 246 MG/DL
CHOLESTEROL/HDL RATIO: 4.3
CO2 SERPL-SCNC: 27 MMOL/L (ref 20–31)
CREAT SERPL-MCNC: 0.61 MG/DL (ref 0.5–0.9)
EOSINOPHILS RELATIVE PERCENT: 2 % (ref 1–4)
GFR SERPL CREATININE-BSD FRML MDRD: >60 ML/MIN/1.73M2
GLUCOSE SERPL-MCNC: 146 MG/DL (ref 70–99)
HCT VFR BLD AUTO: 39.9 % (ref 36.3–47.1)
HDLC SERPL-MCNC: 57 MG/DL
HGB BLD-MCNC: 12.8 G/DL (ref 11.9–15.1)
IMMATURE GRANULOCYTES: 0 %
LDLC SERPL CALC-MCNC: 148 MG/DL (ref 0–130)
LYMPHOCYTES # BLD: 26 % (ref 24–43)
MCH RBC QN AUTO: 28.9 PG (ref 25.2–33.5)
MCHC RBC AUTO-ENTMCNC: 32.1 G/DL (ref 28.4–34.8)
MCV RBC AUTO: 90.1 FL (ref 82.6–102.9)
MONOCYTES # BLD: 8 % (ref 3–12)
NRBC AUTOMATED: 0 PER 100 WBC
PDW BLD-RTO: 12.9 % (ref 11.8–14.4)
PLATELET # BLD AUTO: 311 K/UL (ref 138–453)
PMV BLD AUTO: 10.5 FL (ref 8.1–13.5)
POTASSIUM SERPL-SCNC: 4.6 MMOL/L (ref 3.7–5.3)
PROT SERPL-MCNC: 7.7 G/DL (ref 6.4–8.3)
RBC # BLD: 4.43 M/UL (ref 3.95–5.11)
SEG NEUTROPHILS: 63 % (ref 36–65)
SEGMENTED NEUTROPHILS ABSOLUTE COUNT: 6.15 K/UL (ref 1.5–8.1)
SODIUM SERPL-SCNC: 139 MMOL/L (ref 135–144)
TRIGL SERPL-MCNC: 205 MG/DL
WBC # BLD AUTO: 9.9 K/UL (ref 3.5–11.3)

## 2023-02-04 PROCEDURE — 80053 COMPREHEN METABOLIC PANEL: CPT

## 2023-02-04 PROCEDURE — 80061 LIPID PANEL: CPT

## 2023-02-04 PROCEDURE — 85025 COMPLETE CBC W/AUTO DIFF WBC: CPT

## 2023-02-04 PROCEDURE — 36415 COLL VENOUS BLD VENIPUNCTURE: CPT

## 2023-02-04 PROCEDURE — 83036 HEMOGLOBIN GLYCOSYLATED A1C: CPT

## 2023-02-05 LAB
EST. AVERAGE GLUCOSE BLD GHB EST-MCNC: 128 MG/DL
HBA1C MFR BLD: 6.1 % (ref 4–6)

## 2023-02-24 ENCOUNTER — TELEPHONE (OUTPATIENT)
Dept: GASTROENTEROLOGY | Age: 53
End: 2023-02-24

## 2023-02-24 ENCOUNTER — OFFICE VISIT (OUTPATIENT)
Dept: GASTROENTEROLOGY | Age: 53
End: 2023-02-24

## 2023-02-24 VITALS
BODY MASS INDEX: 34.54 KG/M2 | TEMPERATURE: 98.1 F | HEIGHT: 62 IN | SYSTOLIC BLOOD PRESSURE: 136 MMHG | RESPIRATION RATE: 18 BRPM | WEIGHT: 187.7 LBS | HEART RATE: 94 BPM | DIASTOLIC BLOOD PRESSURE: 75 MMHG

## 2023-02-24 DIAGNOSIS — K86.2 PANCREATIC CYST: ICD-10-CM

## 2023-02-24 DIAGNOSIS — K59.00 CONSTIPATION, UNSPECIFIED CONSTIPATION TYPE: Primary | ICD-10-CM

## 2023-02-24 NOTE — PATIENT INSTRUCTIONS
SURVEY:    You may be receiving a survey from [employer] regarding your visit today. Please complete the survey to enable us to provide the highest quality of care to you and your family. If you cannot score us a very good on any question, please call the office to discuss how we could have made your experience a very good one. Thank you. no vomiting

## 2023-02-24 NOTE — PROGRESS NOTES
[facility] PART OF [place] [address] [address]  Dept: [phone]    Chief Complaint   Patient presents with    Follow-up     F/u elevated liver enzymes, fatty liver. Having issues with bowel urgency/incontinence, also at times having constipation. Having BM's but doesn't feel like she is emptying. Last colonoscopy 10/8/2021. HPI  from 07/06/2022  Ms. Cassandra Rosa is a 47year old woman with a history of hypertension, hyperlipidemia, diabetes mellitus II who presents for evaluation of elevated liver enzymes. She states that she was just started on a 3rd diabetes managing medications - Trulicity, due to an elevated hemoglobin A1c. She also reports that she had blood in her urine and saw a urologist who placed her on antibiotics - it has since subsided with no recurrence in the past 9 months. She denies any family history of liver disease other than alcoholic cirrhosis in her paternal family. She states that as a result, she does not drink so she can stay away from becoming dependent on alcohol. She states that pre-COVID, she was about 218 lbs which was her highest weight. She states that with Trulicity, she has lost about 10-12 pounds. She denies any changes to her eye color or urine colon. RUQ US: 11/18/2021  LIVER:  Liver demonstrates increased echogenicity, and is approximately 21.1   cm in length. No focal hepatic lesion was seen at real-time imaging by the   sonographer. BILIARY SYSTEM:  Gallbladder is unremarkable without evidence of   pericholecystic fluid, wall thickening or stones. Negative sonographic   Schaefer's sign. Gallbladder wall is approximately 2 mm in thickness. Common bile duct is within normal limits measuring 4 mm. RIGHT KIDNEY: Right kidney is approximately 14.4 cm in length, without   hydronephrosis.        PANCREAS:  Visualized portions of the pancreas are unremarkable. OTHER: No evidence of right upper quadrant ascites. Colonoscopy 10/08/2021  Diverticulosis  No polyps  Return in 10 years     Interval history: 2022  States that her PCP has since repeated her liver enzyme labs and the result shows a drop since she started taking Trulicity she states that overall she has lost about 60 pounds in the past 6 months due to appetite suppression with the Trulicity. Interval history 2023  She states that she has been having explosive loose stool as well as constipation. She states that she has been under stress due to her trip [place].      Past Medical History:   Diagnosis Date    Diabetes mellitus ([address]     [place] (hard of hearing)     PHI HEARING AIDS    Hyperlipidemia     Hypertension     PONV (postoperative nausea and vomiting)         Past Surgical History:   Procedure Laterality Date    APPENDECTOMY  1979     SECTION  2003    COLONOSCOPY  10/08/2021    [facility] [place] OF UTERUS N/A 2020    DILATATION AND CURETTAGE HYSTEROSCOPY CAUTERY [place] performed by Tod Kaye MD at [facility] ([address] [place] [address])  2022    VAGINAL LAPAROSCOPIC ROBOTOIC ASSISTED-BSO    HYSTERECTOMY, VAGINAL N/A 2022    HYSTERECTOMY VAGINAL LAPAROSCOPIC ROBOTIC ASSISTED - BSO performed by Ayden Gastelum DO at [facility]    Dr. Foster Naval - vaginal wart removal    TUBAL LIGATION  2004        Family History   Problem Relation Age of Onset    Heart Disease Paternal Grandfather 48         in his early 52's from heart or aneurysm    Diabetes Maternal Grandfather     Heart Attack Maternal Grandfather 80    Hypertension Father     Kidney Cancer Father 59    Lung Cancer Father 68         at age 68 from recurrent lung cancer    Heart Surgery Father     Heart Disease Father     Diabetes Mother     Hypertension Mother Hypertension Sister     Mental Illness Brother         schizophrenia and bipolar disorder    Hypertension Brother         Review of Systems   Cardiovascular:         Hypertension   Endocrine:        Diabetes mellitus type 2  Hyperlipidemia   All other systems reviewed and are negative. /75 (Site: Right Upper Arm, Position: Sitting, Cuff Size: Medium Adult)   Pulse 94   Temp 98.1 °F (36.7 °C) (Temporal)   Resp 18   Ht 5' 2\" (1.575 m)   Wt 187 lb 11.2 oz (85.1 kg)   LMP 12/27/2021   BMI 34.33 kg/m²      Physical Exam  Constitutional:       Appearance: Normal appearance. HENT:      Head: Normocephalic. Right Ear: External ear normal.      Left Ear: External ear normal.      Nose: Nose normal.      Mouth/Throat:      Mouth: Mucous membranes are moist.   Eyes:      Extraocular Movements: Extraocular movements intact. Pupils: Pupils are equal, round, and reactive to light. Cardiovascular:      Rate and Rhythm: Normal rate and regular rhythm. Pulses: Normal pulses. Pulmonary:      Effort: Pulmonary effort is normal.   Abdominal:      General: Bowel sounds are normal.      Palpations: Abdomen is soft. Musculoskeletal:         General: Normal range of motion. Cervical back: Normal range of motion and neck supple. Skin:     General: Skin is warm. Neurological:      General: No focal deficit present. Mental Status: She is alert.    Psychiatric:         Mood and Affect: Mood normal.        Lab Results   Component Value Date    WBC 9.9 02/04/2023    HGB 12.8 02/04/2023    HCT 39.9 02/04/2023    MCV 90.1 02/04/2023     02/04/2023       Lab Results   Component Value Date     02/04/2023    K 4.6 02/04/2023     02/04/2023    CO2 27 02/04/2023    BUN 12 02/04/2023    CREATININE 0.61 02/04/2023    GLUCOSE 146 (H) 02/04/2023    CALCIUM 9.7 02/04/2023    PROT 7.7 02/04/2023    LABALBU 4.2 02/04/2023    BILITOT 0.3 02/04/2023    ALKPHOS 102 02/04/2023    AST 19 02/04/2023    ALT 22 02/04/2023    LABGLOM >60 02/04/2023    GFRAA >60 08/13/2022       Assessment:  Ms. Ajith Quick is a 47year old woman with a history of hypertension, hyperlipidemia, diabetes mellitus II with a history of elevated liver enzymes have since resolved and are now within normal limits. She has constipation which is likely related to the anxiety she felt with travel outside of her comfort zone. Plan:  1. Constipation, unspecified constipation type  - MiraLAX 17 g dissolved in 8 ounces of water up to twice a day as needed  - Concern for postobstructive diarrhea related to constipation    2. Pancreatic cyst  -5 mm sidebranch IPMN was identified in the uncinate process by an MRI with and without contrast performed on 10/4/2022. Plan is to repeat in 1 year  - MRI ABDOMEN W CONTRAST; Future    3. F/U in 6 months    Spent 20 minutes with the patient with greater than 50 percent of the time was spent on face-to-face time in discussion with the patient regarding diagnostic options/results, treatment options, counseling, and follow-up plan.   Electronically signed by Perry Bustillo MD on 2/24/23 at 3:53 PM EST

## 2023-05-11 ENCOUNTER — OFFICE VISIT (OUTPATIENT)
Dept: UROLOGY | Age: 53
End: 2023-05-11

## 2023-05-11 VITALS
TEMPERATURE: 97.7 F | HEIGHT: 62 IN | HEART RATE: 84 BPM | DIASTOLIC BLOOD PRESSURE: 78 MMHG | SYSTOLIC BLOOD PRESSURE: 128 MMHG | BODY MASS INDEX: 35.15 KG/M2 | WEIGHT: 191 LBS

## 2023-05-11 DIAGNOSIS — R31.0 GROSS HEMATURIA: ICD-10-CM

## 2023-05-11 DIAGNOSIS — N95.2 VAGINAL ATROPHY: ICD-10-CM

## 2023-05-11 DIAGNOSIS — N39.0 FREQUENT UTI: Primary | ICD-10-CM

## 2023-05-11 ASSESSMENT — ENCOUNTER SYMPTOMS
COLOR CHANGE: 0
ABDOMINAL PAIN: 0
BACK PAIN: 0
NAUSEA: 0
VOMITING: 0
EYE REDNESS: 0
CONSTIPATION: 0
APNEA: 0
WHEEZING: 0
COUGH: 0
SHORTNESS OF BREATH: 0

## 2023-05-11 NOTE — PROGRESS NOTES
HPI:        Patient is a 47 y.o. female in no acute distress. She is alert and oriented to person, place, and time. History  2021 Referral from Dr. Dolores Palomino for gross hematuria. She has been having intermittent gross hematuria since her ablation in 2020. This has not been associated with flank or abdominal pain. She was never a smoker. She works at [employer]. She denies any history of stones. She does have baseline frequency every 1-2 hours, urge incontinence, and nocturia 2-3 times per night. PVR 96 mL. This is secondary to diabetes. Hgb A1c from 10/2020 was 6.7. She has never seen urology in the past.     CT urogram was negative for  abnormalities. Cystoscopy and pelvic showed normal anatomy    Frequent UTI  2021 - group beta strep, e.coli  2021 - group beta strep  2021 - group beta strep  2020 - e.coli    2021 -started on Macrobid 50 mg for UTI prophylaxis    2022 [facility]. 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 Adenomyosis. Serosal adhesions. Cervix without specific histologic abnormality. Bilateral and fallopian tubes no specific histologic abnormality. Today  Here today to follow-up for frequent urinary tract infections and history of hematuria. She has not had a urinary tract infection since 2021. She is having a daily bowel movement. She denies any dysuria or gross hematuria. She denies any frequency, urgency or incontinence. She is taking daily Macrobid. At her last visit we started her on vaginal estrogen for UTI prevention and hopes to stop suppressive Macrobid, but she did not start this.     Past Medical History:   Diagnosis Date    Diabetes mellitus ([address]     [place] (hard of hearing)     PHI HEARING AIDS    Hyperlipidemia     Hypertension     PONV (postoperative nausea and vomiting)      Past Surgical History:   Procedure Laterality Date    APPENDECTOMY  1979     SECTION  2003    COLONOSCOPY  10/08/2021

## 2023-05-11 NOTE — PATIENT INSTRUCTIONS
Place a pea-sized amount vaginally nightly for one month, then use 3 nights per week thereafter. Do NOT use plastic applicator. SURVEY:    You may be receiving a survey from [employer] regarding your visit today. Please complete the survey to enable us to provide the highest quality of care to you and your family. If you cannot score us a very good on any question, please call the office to discuss how we could of made your experience a very good one. Thank you.

## 2023-07-24 DIAGNOSIS — K86.2 PANCREATIC CYST: Primary | ICD-10-CM

## 2023-07-26 ENCOUNTER — HOSPITAL ENCOUNTER (OUTPATIENT)
Dept: WOMENS IMAGING | Age: 53
Discharge: HOME | End: 2023-07-28
Payer: COMMERCIAL

## 2023-07-26 ENCOUNTER — HOSPITAL ENCOUNTER (OUTPATIENT)
Dept: ULTRASOUND IMAGING | Age: 53
Discharge: HOME | End: 2023-07-28
Payer: COMMERCIAL

## 2023-07-26 DIAGNOSIS — K76.0 FATTY LIVER: ICD-10-CM

## 2023-07-26 DIAGNOSIS — Z12.31 BREAST CANCER SCREENING BY MAMMOGRAM: ICD-10-CM

## 2023-07-26 PROCEDURE — 77063 BREAST TOMOSYNTHESIS BI: CPT

## 2023-07-26 PROCEDURE — 76705 ECHO EXAM OF ABDOMEN: CPT

## 2023-07-31 ENCOUNTER — TELEPHONE (OUTPATIENT)
Dept: UROLOGY | Age: 53
End: 2023-07-31

## 2023-07-31 RX ORDER — NITROFURANTOIN MACROCRYSTALS 50 MG/1
50 CAPSULE ORAL DAILY
Qty: 90 CAPSULE | Refills: 1 | Status: SHIPPED | OUTPATIENT
Start: 2023-07-31

## 2023-07-31 NOTE — TELEPHONE ENCOUNTER
Prescription sent to pharmacy of choice. Patient has an upcoming appointment in November. At that time we are making decision whether or not stop the medication. At this time we did refill.

## 2023-08-11 ENCOUNTER — HOSPITAL ENCOUNTER (OUTPATIENT)
Age: 53
End: 2023-08-11
Attending: INTERNAL MEDICINE
Payer: COMMERCIAL

## 2023-08-12 ENCOUNTER — HOSPITAL ENCOUNTER (OUTPATIENT)
Age: 53
Discharge: HOME | End: 2023-08-12
Payer: COMMERCIAL

## 2023-08-12 DIAGNOSIS — E11.9 CONTROLLED TYPE 2 DIABETES MELLITUS WITHOUT COMPLICATION, WITHOUT LONG-TERM CURRENT USE OF INSULIN (HCC): ICD-10-CM

## 2023-08-12 DIAGNOSIS — I10 ESSENTIAL HYPERTENSION: ICD-10-CM

## 2023-08-12 DIAGNOSIS — E78.2 MIXED HYPERLIPIDEMIA: ICD-10-CM

## 2023-08-12 LAB
ALT SERPL-CCNC: 25 U/L (ref 5–33)
ANION GAP SERPL CALCULATED.3IONS-SCNC: 10 MMOL/L (ref 9–17)
AST SERPL-CCNC: 20 U/L
BUN SERPL-MCNC: 13 MG/DL (ref 6–20)
BUN/CREAT SERPL: 22 (ref 9–20)
CALCIUM SERPL-MCNC: 9.6 MG/DL (ref 8.6–10.4)
CHLORIDE SERPL-SCNC: 102 MMOL/L (ref 98–107)
CHOLEST SERPL-MCNC: 264 MG/DL
CHOLESTEROL/HDL RATIO: 5.3
CO2 SERPL-SCNC: 27 MMOL/L (ref 20–31)
CREAT SERPL-MCNC: 0.6 MG/DL (ref 0.5–0.9)
CREAT UR-MCNC: 83.9 MG/DL (ref 28–217)
ERYTHROCYTE [DISTWIDTH] IN BLOOD BY AUTOMATED COUNT: 13.4 % (ref 11.8–14.4)
EST. AVERAGE GLUCOSE BLD GHB EST-MCNC: 137 MG/DL
GFR SERPL CREATININE-BSD FRML MDRD: >60 ML/MIN/1.73M2
GLUCOSE SERPL-MCNC: 133 MG/DL (ref 70–99)
HBA1C MFR BLD: 6.4 % (ref 4–6)
HCT VFR BLD AUTO: 40.8 % (ref 36.3–47.1)
HDLC SERPL-MCNC: 50 MG/DL
HGB BLD-MCNC: 12.8 G/DL (ref 11.9–15.1)
LDLC SERPL CALC-MCNC: 172 MG/DL (ref 0–130)
MCH RBC QN AUTO: 28.3 PG (ref 25.2–33.5)
MCHC RBC AUTO-ENTMCNC: 31.4 G/DL (ref 28.4–34.8)
MCV RBC AUTO: 90.1 FL (ref 82.6–102.9)
MICROALBUMIN UR-MCNC: <12 MG/L
MICROALBUMIN/CREAT UR-RTO: NORMAL MCG/MG CREAT
NRBC BLD-RTO: 0 PER 100 WBC
PLATELET # BLD AUTO: 297 K/UL (ref 138–453)
PMV BLD AUTO: 9.5 FL (ref 8.1–13.5)
POTASSIUM SERPL-SCNC: 4.6 MMOL/L (ref 3.7–5.3)
RBC # BLD AUTO: 4.53 M/UL (ref 3.95–5.11)
SODIUM SERPL-SCNC: 139 MMOL/L (ref 135–144)
TRIGL SERPL-MCNC: 208 MG/DL
WBC OTHER # BLD: 11.9 K/UL (ref 3.5–11.3)

## 2023-08-12 PROCEDURE — 36415 COLL VENOUS BLD VENIPUNCTURE: CPT

## 2023-08-12 PROCEDURE — 80048 BASIC METABOLIC PNL TOTAL CA: CPT

## 2023-08-12 PROCEDURE — 83036 HEMOGLOBIN GLYCOSYLATED A1C: CPT

## 2023-08-12 PROCEDURE — 85027 COMPLETE CBC AUTOMATED: CPT

## 2023-08-12 PROCEDURE — 84450 TRANSFERASE (AST) (SGOT): CPT

## 2023-08-12 PROCEDURE — 82043 UR ALBUMIN QUANTITATIVE: CPT

## 2023-08-12 PROCEDURE — 80061 LIPID PANEL: CPT

## 2023-08-12 PROCEDURE — 84460 ALANINE AMINO (ALT) (SGPT): CPT

## 2023-08-12 PROCEDURE — 82570 ASSAY OF URINE CREATININE: CPT

## 2023-08-18 ENCOUNTER — HOSPITAL ENCOUNTER (OUTPATIENT)
Dept: MRI IMAGING | Age: 53
Discharge: HOME | End: 2023-08-18
Attending: INTERNAL MEDICINE
Payer: COMMERCIAL

## 2023-08-18 DIAGNOSIS — K86.2 PANCREATIC CYST: ICD-10-CM

## 2023-08-18 PROCEDURE — A9579 GAD-BASE MR CONTRAST NOS,1ML: HCPCS | Performed by: INTERNAL MEDICINE

## 2023-08-18 PROCEDURE — 74183 MRI ABD W/O CNTR FLWD CNTR: CPT

## 2023-08-18 PROCEDURE — 6360000004 HC RX CONTRAST MEDICATION: Performed by: INTERNAL MEDICINE

## 2023-08-18 RX ADMIN — GADOTERIDOL 13 ML: 279.3 INJECTION, SOLUTION INTRAVENOUS at 09:53

## 2023-08-21 ENCOUNTER — TELEPHONE (OUTPATIENT)
Dept: GASTROENTEROLOGY | Age: 53
End: 2023-08-21

## 2023-08-21 NOTE — TELEPHONE ENCOUNTER
----- Message from Sherry Khanna MD sent at 8/20/2023  8:32 PM EDT -----  Please notify patient: Pancreatic cyst is unchanged. Same as the last MRI from 09/19/2022. Repeat MRI in 2 years ( 08/2025) Please notify patient that she should be seen in clinic before her test order is placed.

## 2023-08-21 NOTE — RESULT ENCOUNTER NOTE
Please notify patient: Pancreatic cyst is unchanged. Same as the last MRI from 09/19/2022. Repeat MRI in 2 years ( 08/2025) Please notify patient that she should be seen in clinic before her test order is placed.

## 2023-09-12 ENCOUNTER — OFFICE VISIT (OUTPATIENT)
Dept: OBGYN | Age: 53
End: 2023-09-12
Payer: COMMERCIAL

## 2023-09-12 VITALS
BODY MASS INDEX: 35.08 KG/M2 | HEIGHT: 63 IN | DIASTOLIC BLOOD PRESSURE: 80 MMHG | SYSTOLIC BLOOD PRESSURE: 132 MMHG | WEIGHT: 198 LBS

## 2023-09-12 DIAGNOSIS — Z01.419 WOMEN'S ANNUAL ROUTINE GYNECOLOGICAL EXAMINATION: Primary | ICD-10-CM

## 2023-09-12 PROCEDURE — 99396 PREV VISIT EST AGE 40-64: CPT | Performed by: OBSTETRICS & GYNECOLOGY

## 2023-09-12 ASSESSMENT — ENCOUNTER SYMPTOMS
CONSTIPATION: 0
DIARRHEA: 0
SHORTNESS OF BREATH: 0
ABDOMINAL PAIN: 0

## 2023-09-12 NOTE — PROGRESS NOTES
glucose test strips (ACCU-CHEK DARRELL PLUS) strip, USE TO TEXT BLOOD SUGAR DAILY AS NEEDED., Disp: 100 strip, Rfl: 3    amLODIPine (NORVASC) 5 MG tablet, TAKE 1 TABLET BY MOUTH ONCE A DAY, Disp: 30 tablet, Rfl: 6    lisinopril (PRINIVIL;ZESTRIL) 10 MG tablet, Take 1 tablet by mouth daily, Disp: 30 tablet, Rfl: 6    JANUVIA 100 MG tablet, TAKE 1 TABLET BY MOUTH DAILY, Disp: 90 tablet, Rfl: 3    estradiol (ESTRACE VAGINAL) 0.1 MG/GM vaginal cream, Place a pea-sized amount vaginally nightly for one month, then use 3 nights per week thereafter. Do NOT use plastic applicator. , Disp: 42.5 g, Rfl: 3    TRUEplus Lancets 33G MISC, USE ONCE A DAY, Disp: 100 each, Rfl: 5    Blood Glucose Monitoring Suppl (ACCU-CHEK DARRELL PLUS) W/DEVICE KIT, 1 kit by Does not apply route daily, Disp: 1 kit, Rfl: 0    Social History     Substance and Sexual Activity   Sexual Activity Yes    Partners: Male    Birth control/protection: Surgical       Any bleeding or pain with intercourse: No    Last Yearly date:  22    Last pap date and results: 21    Last HPV date and results: unknown    Has pt ever had an abnormal:Yes    IF yes result and date: , has had hyst    Last Mammogram date and results: 23    Last Dexa scan date and results: Never    Last colorectal screen- type:Colonoscopy*  date  10/8/2021 results Normal    Do you do self breast exams: No    Past Medical History:   Diagnosis Date    Diabetes mellitus ([facility])     [place] (hard of hearing)     PHI HEARING AIDS    Hyperlipidemia     Hypertension     PONV (postoperative nausea and vomiting)        Past Surgical History:   Procedure Laterality Date    APPENDECTOMY       SECTION  2003    COLONOSCOPY  10/08/2021    [facility] [address] N/A 2020    DILATATION AND CURETTAGE HYSTEROSCOPY CAUTERY ABLATION, NOVASURE performed by Ti Rodríguez MD at [address] [facility] ( [facility])

## 2023-11-13 ENCOUNTER — OFFICE VISIT (OUTPATIENT)
Dept: UROLOGY | Age: 53
End: 2023-11-13
Payer: COMMERCIAL

## 2023-11-13 VITALS
SYSTOLIC BLOOD PRESSURE: 134 MMHG | BODY MASS INDEX: 36.18 KG/M2 | WEIGHT: 201 LBS | TEMPERATURE: 97.8 F | DIASTOLIC BLOOD PRESSURE: 78 MMHG

## 2023-11-13 DIAGNOSIS — N95.2 VAGINAL ATROPHY: ICD-10-CM

## 2023-11-13 DIAGNOSIS — R35.0 FREQUENCY OF URINATION: ICD-10-CM

## 2023-11-13 DIAGNOSIS — N39.0 FREQUENT UTI: Primary | ICD-10-CM

## 2023-11-13 PROCEDURE — 99214 OFFICE O/P EST MOD 30 MIN: CPT | Performed by: UROLOGY

## 2023-11-13 ASSESSMENT — ENCOUNTER SYMPTOMS
ABDOMINAL PAIN: 0
BACK PAIN: 0
COLOR CHANGE: 0
COUGH: 0
APNEA: 0
SHORTNESS OF BREATH: 0
EYE REDNESS: 0
CONSTIPATION: 0
WHEEZING: 0
NAUSEA: 0
VOMITING: 0

## 2023-11-13 NOTE — PATIENT INSTRUCTIONS
Survey: You may be receiving a survey from [employer] regarding your visit today. Please complete the survey to enable us to provide the highest quality of care to you and your family.     If you cannot score us as very good on any question, please call the office to discuss how we could have major experience exceptional.    Thank you    Your Urology Care Team.

## 2023-11-13 NOTE — PROGRESS NOTES
atorvastatin (LIPITOR) 20 MG tablet Take 1 tablet by mouth daily 90 tablet 3    nitrofurantoin (MACRODANTIN) 50 MG capsule Take 1 capsule by mouth daily 90 capsule 1    blood glucose test strips (ACCU-CHEK DARRELL PLUS) strip USE TO TEXT BLOOD SUGAR DAILY AS NEEDED. 100 strip 3    JANUVIA 100 MG tablet TAKE 1 TABLET BY MOUTH DAILY 90 tablet 3    estradiol (ESTRACE VAGINAL) 0.1 MG/GM vaginal cream Place a pea-sized amount vaginally nightly for one month, then use 3 nights per week thereafter. Do NOT use plastic applicator. 42.5 g 3    TRUEplus Lancets 33G MISC USE ONCE A  each 5    Blood Glucose Monitoring Suppl (ACCU-CHEK DARRELL PLUS) W/DEVICE KIT 1 kit by Does not apply route daily 1 kit 0     No current facility-administered medications on file prior to visit. Penicillins, Cat hair extract, and Dust mite extract  Family History   Problem Relation Age of Onset    Heart Disease Paternal Grandfather 48         in his early 52's from heart or aneurysm    Diabetes Maternal Grandfather     Heart Attack Maternal Grandfather 80    Hypertension Father     Kidney Cancer Father 59    Lung Cancer Father 68         at age 68 from recurrent lung cancer    Heart Surgery Father     Heart Disease Father     Diabetes Mother     Hypertension Mother     Hypertension Sister     Mental Illness Brother         schizophrenia and bipolar disorder    Hypertension Brother      Social History     Tobacco Use   Smoking Status Never   Smokeless Tobacco Never       Social History     Substance and Sexual Activity   Alcohol Use No       Review of Systems   Constitutional:  Negative for appetite change, chills and fever. Eyes:  Negative for redness and visual disturbance. Respiratory:  Negative for apnea, cough, shortness of breath and wheezing. Cardiovascular:  Negative for chest pain and leg swelling. Gastrointestinal:  Negative for abdominal pain, constipation, nausea and vomiting.    Genitourinary:  Negative for

## 2024-01-30 ENCOUNTER — HOSPITAL ENCOUNTER (OUTPATIENT)
Age: 54
Discharge: HOME | End: 2024-02-01
Payer: COMMERCIAL

## 2024-01-30 ENCOUNTER — HOSPITAL ENCOUNTER (OUTPATIENT)
Dept: GENERAL RADIOLOGY | Age: 54
Discharge: HOME | End: 2024-02-01
Payer: COMMERCIAL

## 2024-01-30 DIAGNOSIS — R10.9 ABDOMINAL PAIN, UNSPECIFIED ABDOMINAL LOCATION: ICD-10-CM

## 2024-01-30 DIAGNOSIS — K59.00 CONSTIPATION, UNSPECIFIED CONSTIPATION TYPE: ICD-10-CM

## 2024-01-30 PROCEDURE — 74019 RADEX ABDOMEN 2 VIEWS: CPT

## 2024-01-31 ENCOUNTER — HOSPITAL ENCOUNTER (INPATIENT)
Age: 54
LOS: 2 days | Discharge: HOME | DRG: 395 | End: 2024-02-02
Attending: EMERGENCY MEDICINE | Admitting: INTERNAL MEDICINE
Payer: COMMERCIAL

## 2024-01-31 ENCOUNTER — APPOINTMENT (OUTPATIENT)
Dept: CT IMAGING | Age: 54
DRG: 395 | End: 2024-01-31
Payer: COMMERCIAL

## 2024-01-31 DIAGNOSIS — K52.9 ACUTE COLITIS: Primary | ICD-10-CM

## 2024-01-31 PROBLEM — I10 HYPERTENSION: Status: ACTIVE | Noted: 2024-01-31

## 2024-01-31 LAB
ALBUMIN SERPL-MCNC: 4.1 G/DL (ref 3.5–5.2)
ALBUMIN/GLOB SERPL: 1.2 {RATIO} (ref 1–2.5)
ALP SERPL-CCNC: 110 U/L (ref 35–104)
ALT SERPL-CCNC: 18 U/L (ref 5–33)
ANION GAP SERPL CALCULATED.3IONS-SCNC: 10 MMOL/L (ref 9–17)
AST SERPL-CCNC: 13 U/L
BASOPHILS # BLD: 0.08 K/UL (ref 0–0.2)
BASOPHILS NFR BLD: 0 % (ref 0–2)
BILIRUB SERPL-MCNC: 1.1 MG/DL (ref 0.3–1.2)
BUN SERPL-MCNC: 12 MG/DL (ref 6–20)
BUN/CREAT SERPL: 24 (ref 9–20)
C DIFF GDH + TOXINS A+B STL QL IA.RAPID: NEGATIVE
CALCIUM SERPL-MCNC: 9 MG/DL (ref 8.6–10.4)
CHLORIDE SERPL-SCNC: 101 MMOL/L (ref 98–107)
CO2 SERPL-SCNC: 26 MMOL/L (ref 20–31)
CREAT SERPL-MCNC: 0.5 MG/DL (ref 0.5–0.9)
EOSINOPHIL # BLD: 0.14 K/UL (ref 0–0.44)
EOSINOPHILS RELATIVE PERCENT: 1 % (ref 1–4)
ERYTHROCYTE [DISTWIDTH] IN BLOOD BY AUTOMATED COUNT: 13.2 % (ref 11.8–14.4)
ERYTHROCYTE [SEDIMENTATION RATE] IN BLOOD BY PHOTOMETRIC METHOD: 47 MM/HR (ref 0–30)
GFR SERPL CREATININE-BSD FRML MDRD: >60 ML/MIN/1.73M2
GLUCOSE BLD-MCNC: 140 MG/DL (ref 74–100)
GLUCOSE BLD-MCNC: 179 MG/DL (ref 74–100)
GLUCOSE SERPL-MCNC: 204 MG/DL (ref 70–99)
HCT VFR BLD AUTO: 40 % (ref 36.3–47.1)
HGB BLD-MCNC: 12.7 G/DL (ref 11.9–15.1)
IMM GRANULOCYTES # BLD AUTO: 0.12 K/UL (ref 0–0.3)
IMM GRANULOCYTES NFR BLD: 1 %
LIPASE SERPL-CCNC: 20 U/L (ref 13–60)
LYMPHOCYTES NFR BLD: 3.58 K/UL (ref 1.1–3.7)
LYMPHOCYTES RELATIVE PERCENT: 17 % (ref 24–43)
MCH RBC QN AUTO: 27.7 PG (ref 25.2–33.5)
MCHC RBC AUTO-ENTMCNC: 31.8 G/DL (ref 28.4–34.8)
MCV RBC AUTO: 87.1 FL (ref 82.6–102.9)
MONOCYTES NFR BLD: 1.42 K/UL (ref 0.1–1.2)
MONOCYTES NFR BLD: 7 % (ref 3–12)
NEUTROPHILS NFR BLD: 74 % (ref 36–65)
NEUTS SEG NFR BLD: 15.8 K/UL (ref 1.5–8.1)
NRBC BLD-RTO: 0 PER 100 WBC
PLATELET # BLD AUTO: 299 K/UL (ref 138–453)
PMV BLD AUTO: 9.5 FL (ref 8.1–13.5)
POTASSIUM SERPL-SCNC: 3.9 MMOL/L (ref 3.7–5.3)
PROT SERPL-MCNC: 7.5 G/DL (ref 6.4–8.3)
RBC # BLD AUTO: 4.59 M/UL (ref 3.95–5.11)
SODIUM SERPL-SCNC: 137 MMOL/L (ref 135–144)
SPECIMEN DESCRIPTION: NORMAL
WBC OTHER # BLD: 21.1 K/UL (ref 3.5–11.3)

## 2024-01-31 PROCEDURE — 99285 EMERGENCY DEPT VISIT HI MDM: CPT

## 2024-01-31 PROCEDURE — 85025 COMPLETE CBC W/AUTO DIFF WBC: CPT

## 2024-01-31 PROCEDURE — 2580000003 HC RX 258: Performed by: EMERGENCY MEDICINE

## 2024-01-31 PROCEDURE — 6360000002 HC RX W HCPCS: Performed by: EMERGENCY MEDICINE

## 2024-01-31 PROCEDURE — 86644 CMV ANTIBODY: CPT

## 2024-01-31 PROCEDURE — 6360000004 HC RX CONTRAST MEDICATION: Performed by: EMERGENCY MEDICINE

## 2024-01-31 PROCEDURE — 80053 COMPREHEN METABOLIC PANEL: CPT

## 2024-01-31 PROCEDURE — 96367 TX/PROPH/DG ADDL SEQ IV INF: CPT

## 2024-01-31 PROCEDURE — 6360000002 HC RX W HCPCS: Performed by: NURSE PRACTITIONER

## 2024-01-31 PROCEDURE — 85652 RBC SED RATE AUTOMATED: CPT

## 2024-01-31 PROCEDURE — 87324 CLOSTRIDIUM AG IA: CPT

## 2024-01-31 PROCEDURE — 87449 NOS EACH ORGANISM AG IA: CPT

## 2024-01-31 PROCEDURE — 96375 TX/PRO/DX INJ NEW DRUG ADDON: CPT

## 2024-01-31 PROCEDURE — G0378 HOSPITAL OBSERVATION PER HR: HCPCS

## 2024-01-31 PROCEDURE — 82947 ASSAY GLUCOSE BLOOD QUANT: CPT

## 2024-01-31 PROCEDURE — 86645 CMV ANTIBODY IGM: CPT

## 2024-01-31 PROCEDURE — 36415 COLL VENOUS BLD VENIPUNCTURE: CPT

## 2024-01-31 PROCEDURE — 83036 HEMOGLOBIN GLYCOSYLATED A1C: CPT

## 2024-01-31 PROCEDURE — 2580000003 HC RX 258: Performed by: NURSE PRACTITIONER

## 2024-01-31 PROCEDURE — 6370000000 HC RX 637 (ALT 250 FOR IP): Performed by: NURSE PRACTITIONER

## 2024-01-31 PROCEDURE — 83993 ASSAY FOR CALPROTECTIN FECAL: CPT

## 2024-01-31 PROCEDURE — 74177 CT ABD & PELVIS W/CONTRAST: CPT

## 2024-01-31 PROCEDURE — 83690 ASSAY OF LIPASE: CPT

## 2024-01-31 PROCEDURE — 1200000000 HC SEMI PRIVATE

## 2024-01-31 PROCEDURE — 96365 THER/PROPH/DIAG IV INF INIT: CPT

## 2024-01-31 PROCEDURE — 96366 THER/PROPH/DIAG IV INF ADDON: CPT

## 2024-01-31 RX ORDER — FENTANYL CITRATE 50 UG/ML
25 INJECTION, SOLUTION INTRAMUSCULAR; INTRAVENOUS ONCE
Status: COMPLETED | OUTPATIENT
Start: 2024-01-31 | End: 2024-01-31

## 2024-01-31 RX ORDER — ACETAMINOPHEN 650 MG/1
650 SUPPOSITORY RECTAL EVERY 6 HOURS PRN
Status: DISCONTINUED | OUTPATIENT
Start: 2024-01-31 | End: 2024-02-02 | Stop reason: HOSPADM

## 2024-01-31 RX ORDER — POLYETHYLENE GLYCOL 3350 17 G/17G
238 POWDER, FOR SOLUTION ORAL ONCE
Status: COMPLETED | OUTPATIENT
Start: 2024-01-31 | End: 2024-01-31

## 2024-01-31 RX ORDER — POTASSIUM CHLORIDE 7.45 MG/ML
10 INJECTION INTRAVENOUS PRN
Status: DISCONTINUED | OUTPATIENT
Start: 2024-01-31 | End: 2024-02-02 | Stop reason: HOSPADM

## 2024-01-31 RX ORDER — MORPHINE SULFATE 2 MG/ML
2 INJECTION, SOLUTION INTRAMUSCULAR; INTRAVENOUS
Status: DISCONTINUED | OUTPATIENT
Start: 2024-01-31 | End: 2024-02-02 | Stop reason: HOSPADM

## 2024-01-31 RX ORDER — ONDANSETRON 2 MG/ML
4 INJECTION INTRAMUSCULAR; INTRAVENOUS EVERY 6 HOURS PRN
Status: DISCONTINUED | OUTPATIENT
Start: 2024-01-31 | End: 2024-02-02 | Stop reason: HOSPADM

## 2024-01-31 RX ORDER — CIPROFLOXACIN 2 MG/ML
400 INJECTION, SOLUTION INTRAVENOUS EVERY 12 HOURS
Status: DISCONTINUED | OUTPATIENT
Start: 2024-02-01 | End: 2024-02-02 | Stop reason: HOSPADM

## 2024-01-31 RX ORDER — POLYETHYLENE GLYCOL 3350 17 G/17G
17 POWDER, FOR SOLUTION ORAL DAILY PRN
Status: DISCONTINUED | OUTPATIENT
Start: 2024-01-31 | End: 2024-02-02 | Stop reason: HOSPADM

## 2024-01-31 RX ORDER — LISINOPRIL 10 MG/1
10 TABLET ORAL DAILY
Status: DISCONTINUED | OUTPATIENT
Start: 2024-01-31 | End: 2024-02-02 | Stop reason: HOSPADM

## 2024-01-31 RX ORDER — ONDANSETRON 4 MG/1
4 TABLET, ORALLY DISINTEGRATING ORAL EVERY 8 HOURS PRN
Status: DISCONTINUED | OUTPATIENT
Start: 2024-01-31 | End: 2024-02-02 | Stop reason: HOSPADM

## 2024-01-31 RX ORDER — SODIUM CHLORIDE 9 MG/ML
INJECTION, SOLUTION INTRAVENOUS PRN
Status: DISCONTINUED | OUTPATIENT
Start: 2024-01-31 | End: 2024-02-02 | Stop reason: HOSPADM

## 2024-01-31 RX ORDER — DEXTROSE MONOHYDRATE 100 MG/ML
INJECTION, SOLUTION INTRAVENOUS CONTINUOUS PRN
Status: DISCONTINUED | OUTPATIENT
Start: 2024-01-31 | End: 2024-02-02 | Stop reason: HOSPADM

## 2024-01-31 RX ORDER — ACETAMINOPHEN 325 MG/1
650 TABLET ORAL EVERY 6 HOURS PRN
Status: DISCONTINUED | OUTPATIENT
Start: 2024-01-31 | End: 2024-02-02 | Stop reason: HOSPADM

## 2024-01-31 RX ORDER — SODIUM CHLORIDE 9 MG/ML
INJECTION, SOLUTION INTRAVENOUS CONTINUOUS
Status: DISCONTINUED | OUTPATIENT
Start: 2024-01-31 | End: 2024-02-02

## 2024-01-31 RX ORDER — 0.9 % SODIUM CHLORIDE 0.9 %
1000 INTRAVENOUS SOLUTION INTRAVENOUS ONCE
Status: COMPLETED | OUTPATIENT
Start: 2024-01-31 | End: 2024-01-31

## 2024-01-31 RX ORDER — FAMOTIDINE 20 MG/1
20 TABLET, FILM COATED ORAL 2 TIMES DAILY
Status: DISCONTINUED | OUTPATIENT
Start: 2024-01-31 | End: 2024-02-02 | Stop reason: HOSPADM

## 2024-01-31 RX ORDER — ONDANSETRON 2 MG/ML
4 INJECTION INTRAMUSCULAR; INTRAVENOUS ONCE
Status: COMPLETED | OUTPATIENT
Start: 2024-01-31 | End: 2024-01-31

## 2024-01-31 RX ORDER — POTASSIUM CHLORIDE 20 MEQ/1
40 TABLET, EXTENDED RELEASE ORAL PRN
Status: DISCONTINUED | OUTPATIENT
Start: 2024-01-31 | End: 2024-02-02 | Stop reason: HOSPADM

## 2024-01-31 RX ORDER — SODIUM CHLORIDE 0.9 % (FLUSH) 0.9 %
10 SYRINGE (ML) INJECTION EVERY 12 HOURS SCHEDULED
Status: DISCONTINUED | OUTPATIENT
Start: 2024-01-31 | End: 2024-02-02 | Stop reason: HOSPADM

## 2024-01-31 RX ORDER — ATORVASTATIN CALCIUM 20 MG/1
20 TABLET, FILM COATED ORAL DAILY
Status: DISCONTINUED | OUTPATIENT
Start: 2024-02-01 | End: 2024-02-02 | Stop reason: HOSPADM

## 2024-01-31 RX ORDER — METRONIDAZOLE 500 MG/100ML
500 INJECTION, SOLUTION INTRAVENOUS ONCE
Status: COMPLETED | OUTPATIENT
Start: 2024-01-31 | End: 2024-01-31

## 2024-01-31 RX ORDER — METRONIDAZOLE 500 MG/100ML
500 INJECTION, SOLUTION INTRAVENOUS EVERY 8 HOURS
Status: DISCONTINUED | OUTPATIENT
Start: 2024-01-31 | End: 2024-02-02 | Stop reason: HOSPADM

## 2024-01-31 RX ORDER — CIPROFLOXACIN 2 MG/ML
400 INJECTION, SOLUTION INTRAVENOUS ONCE
Status: COMPLETED | OUTPATIENT
Start: 2024-01-31 | End: 2024-01-31

## 2024-01-31 RX ORDER — ALOGLIPTIN 25 MG/1
25 TABLET, FILM COATED ORAL DAILY
Status: DISCONTINUED | OUTPATIENT
Start: 2024-01-31 | End: 2024-02-02 | Stop reason: HOSPADM

## 2024-01-31 RX ORDER — AMLODIPINE BESYLATE 10 MG/1
10 TABLET ORAL EVERY MORNING
Status: DISCONTINUED | OUTPATIENT
Start: 2024-02-01 | End: 2024-02-02 | Stop reason: HOSPADM

## 2024-01-31 RX ORDER — SODIUM CHLORIDE 0.9 % (FLUSH) 0.9 %
10 SYRINGE (ML) INJECTION PRN
Status: DISCONTINUED | OUTPATIENT
Start: 2024-01-31 | End: 2024-02-02 | Stop reason: HOSPADM

## 2024-01-31 RX ORDER — GLUCAGON 1 MG/ML
1 KIT INJECTION PRN
Status: DISCONTINUED | OUTPATIENT
Start: 2024-01-31 | End: 2024-02-02 | Stop reason: HOSPADM

## 2024-01-31 RX ORDER — MORPHINE SULFATE 4 MG/ML
4 INJECTION, SOLUTION INTRAMUSCULAR; INTRAVENOUS
Status: DISCONTINUED | OUTPATIENT
Start: 2024-01-31 | End: 2024-02-02 | Stop reason: HOSPADM

## 2024-01-31 RX ADMIN — ALOGLIPTIN 25 MG: 25 TABLET, FILM COATED ORAL at 21:36

## 2024-01-31 RX ADMIN — CIPROFLOXACIN 400 MG: 2 INJECTION, SOLUTION INTRAVENOUS at 13:41

## 2024-01-31 RX ADMIN — METRONIDAZOLE 500 MG: 500 INJECTION, SOLUTION INTRAVENOUS at 14:45

## 2024-01-31 RX ADMIN — LISINOPRIL 10 MG: 10 TABLET ORAL at 21:36

## 2024-01-31 RX ADMIN — FAMOTIDINE 20 MG: 20 TABLET, FILM COATED ORAL at 21:36

## 2024-01-31 RX ADMIN — SODIUM CHLORIDE: 9 INJECTION, SOLUTION INTRAVENOUS at 16:04

## 2024-01-31 RX ADMIN — IOPAMIDOL 75 ML: 755 INJECTION, SOLUTION INTRAVENOUS at 11:29

## 2024-01-31 RX ADMIN — ONDANSETRON 4 MG: 2 INJECTION INTRAMUSCULAR; INTRAVENOUS at 14:14

## 2024-01-31 RX ADMIN — FENTANYL CITRATE 25 MCG: 50 INJECTION, SOLUTION INTRAMUSCULAR; INTRAVENOUS at 14:14

## 2024-01-31 RX ADMIN — METRONIDAZOLE 500 MG: 500 INJECTION, SOLUTION INTRAVENOUS at 21:34

## 2024-01-31 RX ADMIN — SODIUM CHLORIDE 1000 ML: 9 INJECTION, SOLUTION INTRAVENOUS at 13:39

## 2024-01-31 RX ADMIN — POLYETHYLENE GLYCOL 3350 238 G: 17 POWDER, FOR SOLUTION ORAL at 17:39

## 2024-01-31 ASSESSMENT — PAIN - FUNCTIONAL ASSESSMENT: PAIN_FUNCTIONAL_ASSESSMENT: NONE - DENIES PAIN

## 2024-01-31 ASSESSMENT — PAIN DESCRIPTION - LOCATION
LOCATION: ABDOMEN
LOCATION: ABDOMEN

## 2024-01-31 ASSESSMENT — ENCOUNTER SYMPTOMS
ABDOMINAL PAIN: 1
SORE THROAT: 0
DIARRHEA: 0
CONSTIPATION: 1
VOMITING: 0
ABDOMINAL DISTENTION: 0
BACK PAIN: 0
SHORTNESS OF BREATH: 0

## 2024-01-31 ASSESSMENT — LIFESTYLE VARIABLES
HOW MANY STANDARD DRINKS CONTAINING ALCOHOL DO YOU HAVE ON A TYPICAL DAY: PATIENT DOES NOT DRINK
HOW OFTEN DO YOU HAVE A DRINK CONTAINING ALCOHOL: NEVER

## 2024-01-31 ASSESSMENT — PAIN SCALES - GENERAL
PAINLEVEL_OUTOF10: 6
PAINLEVEL_OUTOF10: 0
PAINLEVEL_OUTOF10: 3

## 2024-01-31 NOTE — ED PROVIDER NOTES
Negative for difficulty urinating and dysuria.   Musculoskeletal:  Negative for back pain.   Skin:  Negative for rash.   Psychiatric/Behavioral:  Negative for suicidal ideas.        Except as noted above the remainder of the review of systems was reviewed and negative.       PAST MEDICAL HISTORY     Past Medical History:   Diagnosis Date    Diabetes mellitus (HCC)     [place] (hard of hearing)     PHI HEARING AIDS    Hyperlipidemia     Hypertension     PONV (postoperative nausea and vomiting)          SURGICAL HISTORY       Past Surgical History:   Procedure Laterality Date    APPENDECTOMY  1979     SECTION  2003    COLONOSCOPY  10/08/2021    [facility]-Dr. Yao    DILATION AND CURETTAGE OF UTERUS N/A 2020    DILATATION AND CURETTAGE HYSTEROSCOPY CAUTERY ABLATION, NOVASURE performed by Branden Martinez MD at [facility] OR    ENDOMETRIAL ABLATION      HYSTERECTOMY (CERVIX STATUS UNKNOWN)  2022    VAGINAL LAPAROSCOPIC ROBOTOIC ASSISTED-BSO    HYSTERECTOMY, VAGINAL N/A 2022    HYSTERECTOMY VAGINAL LAPAROSCOPIC ROBOTIC ASSISTED - BSO performed by Rosalia Pendleton DO at [facility] OR    LEEASHISH      Dr. Martinez - vaginal wart removal    TUBAL LIGATION  2004         CURRENT MEDICATIONS       Previous Medications    AMLODIPINE (NORVASC) 10 MG TABLET    Take 1 tablet by mouth every morning    ATORVASTATIN (LIPITOR) 20 MG TABLET    Take 1 tablet by mouth daily    BLOOD GLUCOSE MONITORING SUPPL (ACCU-CHEK DARRELL PLUS) W/DEVICE KIT    1 kit by Does not apply route daily    BLOOD GLUCOSE TEST STRIPS (ACCU-CHEK DARRELL PLUS) STRIP    USE TO TEXT BLOOD SUGAR DAILY AS NEEDED.    DULAGLUTIDE (TRULICITY) 3 MG/0.5ML SOPN    Inject 3 mg into the skin once a week    ESTRADIOL (ESTRACE VAGINAL) 0.1 MG/GM VAGINAL CREAM    Place a pea-sized amount vaginally nightly for one month, then use 3 nights per week thereafter. Do NOT use plastic applicator.    JANUVIA 100 MG TABLET    TAKE 1 TABLET BY MOUTH DAILY    LISINOPRIL  (PRINIVIL;ZESTRIL) 10 MG TABLET    Take 1 tablet by mouth daily    METRONIDAZOLE (FLAGYL) 500 MG TABLET    Take 1 tablet by mouth 2 times daily for 5 days    TRUEPLUS LANCETS 33G MISC    USE ONCE A DAY       ALLERGIES     Penicillins, Cat hair extract, and Dust mite extract    FAMILY HISTORY       Family History   Problem Relation Age of Onset    Heart Disease Paternal Grandfather 50         in his early 50's from heart or aneurysm    Diabetes Maternal Grandfather     Heart Attack Maternal Grandfather 86    Hypertension Father     Kidney Cancer Father 64    Lung Cancer Father 76         at age 76 from recurrent lung cancer    Heart Surgery Father     Heart Disease Father     Diabetes Mother     Hypertension Mother     Hypertension Sister     Mental Illness Brother         schizophrenia and bipolar disorder    Hypertension Brother           SOCIAL HISTORY       Social History     Socioeconomic History    Marital status:      Spouse name: None    Number of children: None    Years of education: None    Highest education level: None   Tobacco Use    Smoking status: Never    Smokeless tobacco: Never   Vaping Use    Vaping Use: Never used   Substance and Sexual Activity    Alcohol use: No    Drug use: No    Sexual activity: Yes     Partners: Male     Birth control/protection: Surgical     Social Determinants of Health     Financial Resource Strain: Low Risk  (2024)    Overall Financial Resource Strain (CARDIA)     Difficulty of Paying Living Expenses: Not hard at all   Food Insecurity: No Food Insecurity (2024)    Hunger Vital Sign     Worried About Running Out of Food in the Last Year: Never true     Ran Out of Food in the Last Year: Never true   Transportation Needs: Unknown (2024)    PRAPARE - Transportation     Lack of Transportation (Non-Medical): No   Social Connections: Unknown (2020)    Social Connection and Isolation Panel [NHANES]     Marital Status:    Housing  Stability: Unknown (1/30/2024)    Housing Stability Vital Sign     Unstable Housing in the Last Year: No       SCREENINGS        [place]ia Coma Scale  Eye Opening: Spontaneous  Best Verbal Response: Oriented  Best Motor Response: Obeys commands  [place] Coma Scale Score: 15               PHYSICAL EXAM    (up to 7 for level 4, 8 or more for level 5)     ED Triage Vitals [01/31/24 0952]   BP Temp Temp Source Pulse Respirations SpO2 Height Weight   135/88 99.8 °F (37.7 °C) Tympanic 97 19 97 % -- --       Physical Exam  Constitutional:       General: She is not in acute distress.     Appearance: Normal appearance. She is not toxic-appearing.   HENT:      Head: Normocephalic and atraumatic.      Mouth/Throat:      Mouth: Mucous membranes are moist.   Eyes:      Extraocular Movements: Extraocular movements intact.      Pupils: Pupils are equal, round, and reactive to light.   Cardiovascular:      Rate and Rhythm: Normal rate and regular rhythm.      Pulses: Normal pulses.      Heart sounds: Normal heart sounds.   Pulmonary:      Effort: Pulmonary effort is normal.      Breath sounds: Normal breath sounds.   Abdominal:      General: Abdomen is flat. Bowel sounds are normal.      Palpations: Abdomen is soft.      Comments: Mild generalized abdominal pain.   Genitourinary:     Comments: There is a small anal fissure at the 6 o'clock position with a little bit of blood.  There is a small external hemorrhoid with no bleeding.    There was presence of bright red blood on the fingertip upon rectal examination.  No tenderness or internal hemorrhoids felt on palpation.  Musculoskeletal:         General: Normal range of motion.   Skin:     General: Skin is warm and dry.      Capillary Refill: Capillary refill takes less than 2 seconds.   Neurological:      General: No focal deficit present.      Mental Status: She is alert and oriented to person, place, and time.   Psychiatric:         Mood and Affect: Mood normal.         DIAGNOSTIC  BRAD Montoya

## 2024-01-31 NOTE — H&P
History and Physical    Patient:  Shelley Jordan  MRN: [id number]    Chief Complaint: Abdominal pain and rectal bleeding    History Obtained From:  patient, electronic medical record    PCP: Raul Messina MD    History of Present Illness:   The patient is a 53 y.o. female who presented to the emergency room with complaints of rectal bleeding and left lower quadrant abdominal pain.  Symptom onset began last week.  Patient reported on Sunday she felt as though she was constipated and reported that she had bowel movements by Monday but was hard.  She reported that she passed some blood clots approximately 6-7 episodes.  She stated that she did call her primary care and x-ray was completed but negative for any acute findings and she was advised to take MiraLAX for constipation.  Patient stated that her stools softened up however she continued to pass blood.  Patient  reported that he thought she became pale and was diaphoretic while sitting on the toilet.  She denied any nausea or vomiting.  She denied fever or chills.  During patient's evaluation rectal exam was completed while in the emergency room and patient was found to have a small anal fissure at the 6 o'clock position with a little bit of blood and a small external hemorrhoid but no bleeding.  They reported presence of bright red blood on the fingertip upon rectal exam but no tenderness or internal hemorrhoids were palpable.  Radiology studies were concerning for acute colitis involving the left colon from the splenic flexure down to the sigmoid.  Patient was also found to have inflammation and thickening as well as pericolonic mesenteric inflammation.  Patient reported she does have history of hypertension and recently had her blood pressure medication increased as her blood pressure was uncontrolled but has improved.    Past Medical History:        Diagnosis Date    Diabetes mellitus (HCC)     [place] (hard of hearing)     PHI HEARING AIDS     today  Medications:   Continue Januvia, diluglutide  Hypoglycemia protocol    Nutrition status:   morbid obesity  Dietician consult initiated    Hospital Prophylaxis:   DVT: none due to rectal bleed    Stress Ulcer: PPI     MDM Data:   Test interpretation:  My independent EKG interpretation: normal sinus rhythm  My independent X-ray interpretation:   Reviewed  Management and/or test interpretation discussed with  Dr. Landers  Consults and Nursing notes were personally reviewed, all current labs and imaging were personally reviewed, tests ordered: CBC, BMP, and history obtained by independent historian       Disposition:  Shared decision making: All test results, treatment options and disposition options were discussed with the patient today  Social determinants of health that may impact management: none  Code status: Full Code   Disposition: Discharge plan is pending        Critical Care Time:  Total critical care time caring for this patient with life threatening, unstable organ failure, including direct patient contact, management of life support systems, review of data including imaging and labs, discussions with other team members and physicians at least 0 minutes so far today, excluding separately billable procedures.      [facility] Medication Reconciliation documentation:    [x] I have utilized all available immediate resources to obtain, update, or review the patient's current medications (including all prescriptions, over-the-counter products, herbals, cannabinoid products and bitamin/mineral/dietary/nutritional supplements.  [If 'yes\", STOP HERE]     []  The patient is not eligible for medication reconciliation; the patient is in an emergent medical situation where delaying treatment would jeopardize the patient's health    []  I did NOT confirm, update or review the patient's current list of medications today.  [DOES NOT SATISFY [facility] PERFORMANCE]        [facility] Advanced Care Planning documentation:  [x] I have

## 2024-01-31 NOTE — PROGRESS NOTES
Dr. Landers's office called at this time, Dr. Landers not in the office today, but is doing surgery in OR today.    OR supervisor called and consult noted left with Louise to be given to Dr. Landers when she finished surgery. Call back number left.

## 2024-01-31 NOTE — PROGRESS NOTES
Patient admitted to the floor at this time. Patient located in room 318. Report received from Chantell WASHINGTON at the bedside. Patient transferred to the bed at this time independently and uses the restroom. Vital signs, weight, height, and head to toe assessment completed at this time, see flowsheets for more details. Patient denies of pain at this time, but pain does come and go intermittently. Patient is A&O x4. Patient oriented to the call light system at this time. White board completed. Call light and bedside table within reach. Bed wheels locked. Bed in lowest position. Patient educated to let staff know and see bowel movement when she has them so blood can be assessed for.

## 2024-02-01 ENCOUNTER — ANESTHESIA EVENT (OUTPATIENT)
Dept: OPERATING ROOM | Age: 54
End: 2024-02-01
Payer: COMMERCIAL

## 2024-02-01 ENCOUNTER — ANESTHESIA (OUTPATIENT)
Dept: OPERATING ROOM | Age: 54
End: 2024-02-01
Payer: COMMERCIAL

## 2024-02-01 PROBLEM — K55.9 ISCHEMIC COLITIS (HCC): Status: ACTIVE | Noted: 2024-02-01

## 2024-02-01 LAB
ANION GAP SERPL CALCULATED.3IONS-SCNC: 12 MMOL/L (ref 9–17)
BASOPHILS # BLD: 0.09 K/UL (ref 0–0.2)
BASOPHILS NFR BLD: 1 % (ref 0–2)
BUN SERPL-MCNC: 8 MG/DL (ref 6–20)
BUN/CREAT SERPL: 16 (ref 9–20)
CALCIUM SERPL-MCNC: 8.4 MG/DL (ref 8.6–10.4)
CHLORIDE SERPL-SCNC: 101 MMOL/L (ref 98–107)
CMV IGG SERPL QL IA: 283
CMV IGM SERPL QL IA: 0.3
CO2 SERPL-SCNC: 22 MMOL/L (ref 20–31)
CREAT SERPL-MCNC: 0.5 MG/DL (ref 0.5–0.9)
CRP SERPL HS-MCNC: 60.7 MG/L (ref 0–5)
EOSINOPHIL # BLD: 0.18 K/UL (ref 0–0.44)
EOSINOPHILS RELATIVE PERCENT: 1 % (ref 1–4)
ERYTHROCYTE [DISTWIDTH] IN BLOOD BY AUTOMATED COUNT: 13.3 % (ref 11.8–14.4)
ERYTHROCYTE [SEDIMENTATION RATE] IN BLOOD BY PHOTOMETRIC METHOD: 39 MM/HR (ref 0–30)
EST. AVERAGE GLUCOSE BLD GHB EST-MCNC: 212 MG/DL
GFR SERPL CREATININE-BSD FRML MDRD: >60 ML/MIN/1.73M2
GLUCOSE BLD-MCNC: 125 MG/DL (ref 74–100)
GLUCOSE BLD-MCNC: 136 MG/DL (ref 74–100)
GLUCOSE BLD-MCNC: 153 MG/DL (ref 74–100)
GLUCOSE BLD-MCNC: 180 MG/DL (ref 74–100)
GLUCOSE SERPL-MCNC: 186 MG/DL (ref 70–99)
HBA1C MFR BLD: 9 % (ref 4–6)
HCT VFR BLD AUTO: 35.3 % (ref 36.3–47.1)
HGB BLD-MCNC: 11.3 G/DL (ref 11.9–15.1)
IMM GRANULOCYTES # BLD AUTO: 0.09 K/UL (ref 0–0.3)
IMM GRANULOCYTES NFR BLD: 1 %
LYMPHOCYTES NFR BLD: 3.07 K/UL (ref 1.1–3.7)
LYMPHOCYTES RELATIVE PERCENT: 16 % (ref 24–43)
MCH RBC QN AUTO: 28 PG (ref 25.2–33.5)
MCHC RBC AUTO-ENTMCNC: 32 G/DL (ref 28.4–34.8)
MCV RBC AUTO: 87.6 FL (ref 82.6–102.9)
MONOCYTES NFR BLD: 1.36 K/UL (ref 0.1–1.2)
MONOCYTES NFR BLD: 7 % (ref 3–12)
NEUTROPHILS NFR BLD: 74 % (ref 36–65)
NEUTS SEG NFR BLD: 14.54 K/UL (ref 1.5–8.1)
NRBC BLD-RTO: 0 PER 100 WBC
PLATELET # BLD AUTO: 261 K/UL (ref 138–453)
PMV BLD AUTO: 9.9 FL (ref 8.1–13.5)
POTASSIUM SERPL-SCNC: 3.6 MMOL/L (ref 3.7–5.3)
RBC # BLD AUTO: 4.03 M/UL (ref 3.95–5.11)
SODIUM SERPL-SCNC: 135 MMOL/L (ref 135–144)
WBC OTHER # BLD: 19.3 K/UL (ref 3.5–11.3)

## 2024-02-01 PROCEDURE — 6360000002 HC RX W HCPCS: Performed by: NURSE PRACTITIONER

## 2024-02-01 PROCEDURE — 2500000003 HC RX 250 WO HCPCS: Performed by: NURSE ANESTHETIST, CERTIFIED REGISTERED

## 2024-02-01 PROCEDURE — 80048 BASIC METABOLIC PNL TOTAL CA: CPT

## 2024-02-01 PROCEDURE — 2709999900 HC NON-CHARGEABLE SUPPLY: Performed by: INTERNAL MEDICINE

## 2024-02-01 PROCEDURE — G0378 HOSPITAL OBSERVATION PER HR: HCPCS

## 2024-02-01 PROCEDURE — 3700000001 HC ADD 15 MINUTES (ANESTHESIA): Performed by: INTERNAL MEDICINE

## 2024-02-01 PROCEDURE — 6360000002 HC RX W HCPCS: Performed by: NURSE ANESTHETIST, CERTIFIED REGISTERED

## 2024-02-01 PROCEDURE — 36415 COLL VENOUS BLD VENIPUNCTURE: CPT

## 2024-02-01 PROCEDURE — 0DBM8ZX EXCISION OF DESCENDING COLON, VIA NATURAL OR ARTIFICIAL OPENING ENDOSCOPIC, DIAGNOSTIC: ICD-10-PCS | Performed by: INTERNAL MEDICINE

## 2024-02-01 PROCEDURE — 99232 SBSQ HOSP IP/OBS MODERATE 35: CPT | Performed by: INTERNAL MEDICINE

## 2024-02-01 PROCEDURE — 6360000002 HC RX W HCPCS: Performed by: INTERNAL MEDICINE

## 2024-02-01 PROCEDURE — 86140 C-REACTIVE PROTEIN: CPT

## 2024-02-01 PROCEDURE — 96366 THER/PROPH/DIAG IV INF ADDON: CPT

## 2024-02-01 PROCEDURE — 3700000000 HC ANESTHESIA ATTENDED CARE: Performed by: INTERNAL MEDICINE

## 2024-02-01 PROCEDURE — 85025 COMPLETE CBC W/AUTO DIFF WBC: CPT

## 2024-02-01 PROCEDURE — 45380 COLONOSCOPY AND BIOPSY: CPT | Performed by: INTERNAL MEDICINE

## 2024-02-01 PROCEDURE — 3609010300 HC COLONOSCOPY W/BIOPSY SINGLE/MULTIPLE: Performed by: INTERNAL MEDICINE

## 2024-02-01 PROCEDURE — 2580000003 HC RX 258: Performed by: INTERNAL MEDICINE

## 2024-02-01 PROCEDURE — 1200000000 HC SEMI PRIVATE

## 2024-02-01 PROCEDURE — 82947 ASSAY GLUCOSE BLOOD QUANT: CPT

## 2024-02-01 PROCEDURE — 2580000003 HC RX 258: Performed by: NURSE PRACTITIONER

## 2024-02-01 PROCEDURE — 85652 RBC SED RATE AUTOMATED: CPT

## 2024-02-01 PROCEDURE — 7100000010 HC PHASE II RECOVERY - FIRST 15 MIN: Performed by: INTERNAL MEDICINE

## 2024-02-01 PROCEDURE — 6370000000 HC RX 637 (ALT 250 FOR IP): Performed by: INTERNAL MEDICINE

## 2024-02-01 PROCEDURE — 6370000000 HC RX 637 (ALT 250 FOR IP): Performed by: NURSE PRACTITIONER

## 2024-02-01 PROCEDURE — 88305 TISSUE EXAM BY PATHOLOGIST: CPT

## 2024-02-01 PROCEDURE — 87506 IADNA-DNA/RNA PROBE TQ 6-11: CPT

## 2024-02-01 PROCEDURE — 7100000011 HC PHASE II RECOVERY - ADDTL 15 MIN: Performed by: INTERNAL MEDICINE

## 2024-02-01 RX ORDER — PROPOFOL 10 MG/ML
INJECTION, EMULSION INTRAVENOUS PRN
Status: DISCONTINUED | OUTPATIENT
Start: 2024-02-01 | End: 2024-02-01 | Stop reason: SDUPTHER

## 2024-02-01 RX ADMIN — SODIUM CHLORIDE: 9 INJECTION, SOLUTION INTRAVENOUS at 09:41

## 2024-02-01 RX ADMIN — LIDOCAINE HYDROCHLORIDE 60 MG: 20 INJECTION, SOLUTION EPIDURAL; INFILTRATION; INTRACAUDAL at 15:12

## 2024-02-01 RX ADMIN — CIPROFLOXACIN 400 MG: 400 INJECTION, SOLUTION INTRAVENOUS at 01:09

## 2024-02-01 RX ADMIN — METRONIDAZOLE 500 MG: 500 INJECTION, SOLUTION INTRAVENOUS at 05:22

## 2024-02-01 RX ADMIN — ALOGLIPTIN 25 MG: 25 TABLET, FILM COATED ORAL at 19:48

## 2024-02-01 RX ADMIN — CIPROFLOXACIN 400 MG: 400 INJECTION, SOLUTION INTRAVENOUS at 12:39

## 2024-02-01 RX ADMIN — FAMOTIDINE 20 MG: 20 TABLET, FILM COATED ORAL at 09:38

## 2024-02-01 RX ADMIN — SODIUM CHLORIDE: 9 INJECTION, SOLUTION INTRAVENOUS at 01:08

## 2024-02-01 RX ADMIN — METRONIDAZOLE 500 MG: 500 INJECTION, SOLUTION INTRAVENOUS at 17:04

## 2024-02-01 RX ADMIN — SODIUM CHLORIDE: 9 INJECTION, SOLUTION INTRAVENOUS at 17:03

## 2024-02-01 RX ADMIN — LISINOPRIL 10 MG: 10 TABLET ORAL at 19:48

## 2024-02-01 RX ADMIN — AMLODIPINE BESYLATE 10 MG: 10 TABLET ORAL at 09:38

## 2024-02-01 RX ADMIN — PROPOFOL 60 MG: 10 INJECTION, EMULSION INTRAVENOUS at 15:12

## 2024-02-01 RX ADMIN — PROPOFOL 150 MCG/KG/MIN: 10 INJECTION, EMULSION INTRAVENOUS at 15:13

## 2024-02-01 RX ADMIN — FAMOTIDINE 20 MG: 20 TABLET, FILM COATED ORAL at 19:48

## 2024-02-01 RX ADMIN — ATORVASTATIN CALCIUM 20 MG: 20 TABLET, FILM COATED ORAL at 09:38

## 2024-02-01 ASSESSMENT — PAIN SCALES - GENERAL
PAINLEVEL_OUTOF10: 0

## 2024-02-01 ASSESSMENT — PAIN - FUNCTIONAL ASSESSMENT
PAIN_FUNCTIONAL_ASSESSMENT: 0-10
PAIN_FUNCTIONAL_ASSESSMENT: 0-10

## 2024-02-01 ASSESSMENT — LIFESTYLE VARIABLES: SMOKING_STATUS: 0

## 2024-02-01 NOTE — PROGRESS NOTES
Progress Note    SUBJECTIVE:    Patient seen for f/u of Acute colitis.  She resting in bed alert oriented no acute distress.  Patient has no complaints.  Patient slightly improved.  Passing liquid from bowel prep.  Denies gross bloody liquid    ROS:   Constitutional: negative  for fevers, and negative for chills.  Respiratory: negative for shortness of breath, negative for cough, and negative for wheezing  Cardiovascular: negative for chest pain, and negative for palpitations  Gastrointestinal: positive for abdominal pain, negative for nausea,negative for vomiting, negative for diarrhea, and negative for constipation     All other systems were reviewed with the patient and are negative unless otherwise stated in HPI      OBJECTIVE:      Vitals:   Vitals:    02/01/24 0731   BP: (!) 128/90   Pulse: 79   Resp: 18   Temp: 98.9 °F (37.2 °C)   SpO2: 98%     Weight - Scale: 91 kg (200 lb 11.2 oz)   Height: 157.5 cm (5' 2\")     Weight  Wt Readings from Last 3 Encounters:   02/01/24 91 kg (200 lb 11.2 oz)   01/30/24 91.5 kg (201 lb 12.8 oz)   11/13/23 91.2 kg (201 lb)     Body mass index is 36.71 kg/m².    24HR INTAKE/OUTPUT:      Intake/Output Summary (Last 24 hours) at 2/1/2024 1037  Last data filed at 2/1/2024 0735  Gross per 24 hour   Intake 2401.24 ml   Output --   Net 2401.24 ml     -----------------------------------------------------------------  Exam:    GEN:    Awake, alert and oriented x3.   EYES:  EOMI, pupils equal   NECK: Supple. No lymphadenopathy.  No carotid bruit  CVS:    regular rate and rhythm, no audible murmur  PULM:  CTA, no wheezes, rales or rhonchi, no acute respiratory distress  ABD:    Bowels sounds normal.  Abdomen is soft.  No distention.  LLQ tenderness to palpation.   EXT:   no edema bilaterally .  No calf tenderness.   NEURO: Moves all extremities.  Motor and sensory are grossly intact  SKIN:  No rashes.  No skin lesions.   involving the left colon from the splenic flexure   down to the sigmoid. There is colonic wall inflammation and thickening as   well as pericolonic mesenteric inflammation. This likely accounts for rectal   bleeding. No bowel obstruction or perforation.               ASSESSMENT / PLAN:    MEDICAL DECISION MAKING:    Primary Problem(s): Acute colitis  Differential diagnoses: Ischemic colitis, ulcerative colitis  Condition is an undiagnosed new problem with uncertain prognosis  Condition is stable  Treatment plan:   Appreciate Dr. Landers  ESR-initial 47, currently 39  CRP-60.7  CMV  IgG 283.0  IgM-0.3  MiraLAX bowel completed  Colonoscopy today  Trend labs  Stool for C. Difficile-negative  Stool for fecal calprotectin-pending  Clear liquid diet, n.p.o. after midnight  Imaging: no further imaging studies ordered today  Medications:   IV fluids  IV Flagyl/Cipro  MiraLAX bowel prep-completed  Medication Monitoring / High Risk Medications: none     Hypertension  Condition is stable  Treatment plan: Continue current treatment  Imaging: no further imaging studies ordered today  Medications:   Continue amlodipine, lisinopril     Type 2 diabetes  Condition is a chronic stable condition  Treatment plan:   POCT before meals and at bedtime  Imaging: no further imaging studies ordered today  Medications:   Continue Januvia, diluglutide  Hypoglycemia protocol     Nutrition status:   morbid obesity  Dietician consult initiated     Hospital Prophylaxis:   DVT: none due to rectal bleed    Stress Ulcer: PPI      Disposition:  Shared decision making: All test results, treatment options and disposition options were discussed with the patient today  Social determinants of health that may impact management: none  Code status: Full Code   Disposition: Discharge plan is pending    [facility] Advanced Care Planning documentation:  [x] I have confirmed that the patient's Advance Care Plan is present, Code Status is documented, or surrogate decision  maker is listed in the patient's medical record  [If \"yes\", STOP HERE]     [] The patient's Advance Care Plan is NOT present because:    []  I confirmed today that the patient does not wish or was not able to name a   surrogate decision maker or provide and advance care plan.    [] Hospice care is currently being provided or has been provided within the   calendar year.    []  I did NOT confirm today the presence of an Advance Care Plan or surrogate   decision maker documented within the patient's medical record.   [DOES NOT SATISFY [facility] PERFORMANCE]    Bushra Ge, BISHNU - CNP , BISHNU, NP-C  Hospitalist Medicine        2/1/2024, 10:37 AM

## 2024-02-01 NOTE — PROGRESS NOTES
Shift assessment and vitals obtained at this time as charted. Vitals WNL, patient denies pain at this time. Patient is alert and oriented x4. Lung sounds clear throughout. Abdomen is soft, rounded, distended and tender with active bowel sounds throughout. Patient continues to have bowel movements and states they are watery/yellow with small pieces of stool. Assessment otherwise as charted, see flowsheets. Patient is resting in the chair with chair alarm on and call light in reach, denies other needs at this time. Care ongoing.

## 2024-02-01 NOTE — PROGRESS NOTES
Evening medications given at this time per orders. Patient took medications with no issues noted. Patient denies any further needs or concerns. Call light and over bed table within reach.

## 2024-02-01 NOTE — PROGRESS NOTES
Patient in chair, awake. Reassessment completed at this time. Vitals taken and documented. Patient encouraged to ask questions. Patient denies pain or complaints. Call light and bed side table within reach.

## 2024-02-01 NOTE — PROGRESS NOTES
Discharge Criteria    Inpatients must meet Criteria 1 through 7. All other patients are either YES or N/A. If a NO is chosen then Anesthesia or Surgeon must be notified.      1.  Minimum 30 minutes after last dose of sedative medication.    Yes      2.  Systolic BP between 90 - 160. Diastolic BP between 60 - 90.    Yes      3.  Pulse between 60 - 120    Yes      4.  Respirations between 8 - 25.    Yes      5.  SpO2 92% - 100%.    Yes      6.  Able to cough and swallow or return to baseline function.    Yes      7.  Alert and oriented or return to baseline mental status.    Yes      8.  Demonstrates controlled, coordinated movements, ambulates with steady gait, or return to baseline activity function.    Yes      9.  Minimal or no pain or nausea, or at a level tolerable and acceptable to patient.    Yes      10. Takes and retains oral fluids as allowed.    Yes      11. Procedural / perioperative site stable.  Minimal or no bleeding.    Yes          12. If GI endoscopy procedure, minimal or no abdominal distention or passing flatus.    Yes      13. Written discharge instructions and emergency telephone number provided.    N/A      14. Accompanied by a responsible adult.    Yes

## 2024-02-01 NOTE — ANESTHESIA PRE PROCEDURE
02/01/2024 05:50 AM       CMP:   Lab Results   Component Value Date/Time     02/01/2024 05:50 AM    K 3.6 02/01/2024 05:50 AM     02/01/2024 05:50 AM    CO2 22 02/01/2024 05:50 AM    BUN 8 02/01/2024 05:50 AM    CREATININE 0.5 02/01/2024 05:50 AM    GFRAA >60 08/13/2022 08:33 AM    AGRATIO 1.2 01/31/2024 10:56 AM    LABGLOM >60 02/01/2024 05:50 AM    GLUCOSE 186 02/01/2024 05:50 AM    PROT 7.5 01/31/2024 10:56 AM    CALCIUM 8.4 02/01/2024 05:50 AM    BILITOT 1.1 01/31/2024 10:56 AM    ALKPHOS 110 01/31/2024 10:56 AM    AST 13 01/31/2024 10:56 AM    ALT 18 01/31/2024 10:56 AM       POC Tests:   Recent Labs     02/01/24  1116   POCGLU 153*         Coags:   Lab Results   Component Value Date/Time    PROTIME 9.8 02/16/2020 10:25 AM    INR 1.0 02/16/2020 10:25 AM    APTT 26.1 02/16/2020 10:25 AM       HCG (If Applicable): No results found for: \"PREGTESTUR\", \"PREGSERUM\", \"HCG\", \"HCGQUANT\"     ABGs: No results found for: \"PHART\", \"PO2ART\", \"RZN1AWY\", \"IGB6JOR\", \"BEART\", \"L2XKPGVY\"     Type & Screen (If Applicable):  No results found for: \"LABABO\", \"LABRH\"    Drug/Infectious Status (If Applicable):  Lab Results   Component Value Date/Time    HEPCAB NONREACTIVE 07/07/2022 05:19 PM       COVID-19 Screening (If Applicable):   Lab Results   Component Value Date/Time    COVID19 Not Detected 11/27/2020 10:37 AM         Anesthesia Evaluation  Patient summary reviewed and Nursing notes reviewed   history of anesthetic complications: PONV.  Airway: Mallampati: I  TM distance: >3 FB   Neck ROM: full  Mouth opening: > = 3 FB   Dental: normal exam         Pulmonary:Negative Pulmonary ROS and normal exam  breath sounds clear to auscultation      (-) not a current smoker                           Cardiovascular:  Exercise tolerance: good (>4 METS)  (+) hypertension:, hyperlipidemia    (-) CABG/stent and  angina    ECG reviewed  Rhythm: regular  Rate: normal           Beta Blocker:  Not on Beta Blocker      ROS

## 2024-02-01 NOTE — PLAN OF CARE
Problem: Discharge Planning  Goal: Discharge to home or other facility with appropriate resources  Outcome: Progressing  Flowsheets (Taken 2/1/2024 0232)  Discharge to home or other facility with appropriate resources: Identify barriers to discharge with patient and caregiver     Problem: Pain  Goal: Verbalizes/displays adequate comfort level or baseline comfort level  Outcome: Progressing  Flowsheets (Taken 2/1/2024 0232)  Verbalizes/displays adequate comfort level or baseline comfort level:   Encourage patient to monitor pain and request assistance   Administer analgesics based on type and severity of pain and evaluate response   Consider cultural and social influences on pain and pain management   Assess pain using appropriate pain scale   Implement non-pharmacological measures as appropriate and evaluate response   Notify Licensed Independent Practitioner if interventions unsuccessful or patient reports new pain     Problem: Safety - Adult  Goal: Free from fall injury  Outcome: Progressing  Flowsheets (Taken 2/1/2024 0232)  Free From Fall Injury: Instruct family/caregiver on patient safety

## 2024-02-01 NOTE — ANESTHESIA POSTPROCEDURE EVALUATION
Department of Anesthesiology  Postprocedure Note    Patient: Shelley Jordan  MRN: [id number]  YOB: 1970  Date of evaluation: 2/1/2024    Procedure Summary       Date: 02/01/24 Room / Location: [place] [address] / [facility]    Anesthesia Start: 1509 Anesthesia Stop: 1545    Procedure: COLONOSCOPY WITH BIOPSY Diagnosis:       Acute colitis      (Acute colitis [K52.9])    Surgeons: Corina Landers MD Responsible Provider: Franchesca Archibald APRN - CRNA    Anesthesia Type: general ASA Status: 3            Anesthesia Type: No value filed.    Jose D Phase I:      Jose D Phase II: Jose D Score: 10    Anesthesia Post Evaluation    Patient location during evaluation: PACU  Patient participation: complete - patient participated  Level of consciousness: awake and alert  Pain score: 0  Airway patency: patent  Nausea & Vomiting: no vomiting and no nausea  Cardiovascular status: blood pressure returned to baseline  Respiratory status: acceptable  Hydration status: stable  Pain management: adequate    No notable events documented.

## 2024-02-01 NOTE — OP NOTE
[facility]    COLONOSCOPY    PROCEDURE DATE: 02/01/24    REFERRING PHYSICIAN: No ref. provider found     PRIMARY CARE PROVIDER: Raul Messina MD    ATTENDING PHYSICIAN: JORDIN PLASCENCIA MD     HISTORY: Ms. Shelley Jordan is a 53 y.o. female who presents to the  endoscopy unit for colonoscopy. The patient's clinical history is remarkable for hypertension. She is currently medically stable and appropriate for the planned procedure.       PREOPERATIVE DIAGNOSIS: hematochezia.     PROCEDURES:   Transanal Colonoscopy --diagnostic.     POSTPROCEDURE DIAGNOSIS:  Ischemic colitis    MEDICATIONS:   MAC per anesthesia     EBL 2 ml      INSTRUMENT: Olympus CF-H190 AL Pediatric flexible Colonoscope.     PREPARATION: The nature and character of the procedure as well as risks, benefits, and alternatives were discussed with the patient and informed consent was obtained. Complications were said to include, but were not limited to: medication allergy, medication reaction, cardiovascular and respiratory problems, bleeding, perforation, infection, and/or missed diagnosis. Following arrival in the endoscopy room, the patient was placed in the left lateral decubitus position and final time-out accomplished in the presence of the nursing staff. Baseline vital signs were obtained and reviewed, and IV sedation was subsequently initiated.       FINDINGS: Rectal examination demonstrated no significant visible external abnormality and digital palpation was unremarkable. Following adequate conscious sedation the colonoscope was introduced and advanced under direct visualization to the cecum, which was identified by the ileocecal valve and appendiceal orifice. The bowel preparation was felt to be poor with liquid foul smelling brown stool. Cecal intubation time was 9 minutes.     Once maximally inserted, the endoscope was withdrawn, fluid as well as gas was suctioned and the mucosa was carefully inspected. The mucosal exam

## 2024-02-01 NOTE — PROGRESS NOTES
Patient arrived back on floor at this time from surgery. Report received at bedside from surgery RN. Vitals obtained as charted and patient was provided with ice water, chicken broth and pudding per her request. Patient is resting in the bed with call light in reach, denies other needs at this time. Care ongoing.

## 2024-02-01 NOTE — PROGRESS NOTES
Comprehensive Nutrition Assessment    Type and Reason for Visit:  Initial, Patient Education    Nutrition Recommendations/Plan:   Encourage lower fiber foods once diet re-advanced to solids.  Recommend probiotic daily  Encourage yogurt for probiotic.      Malnutrition Assessment:  Malnutrition Status:  At risk for malnutrition (Comment) (02/01/24 1155)    Context:  Acute Illness     Findings of the 6 clinical characteristics of malnutrition:  Energy Intake:  Mild decrease in energy intake (Comment) (NPO)  Weight Loss:  No significant weight loss     Body Fat Loss:  No significant body fat loss     Muscle Mass Loss:  No significant muscle mass loss    Fluid Accumulation:  No significant fluid accumulation     Strength:  Not Performed    Nutrition Assessment:    Inadequate nutrient intakes r/t altered GI status, AEB NPO for colonoscopy.  No IBS history reported.  Diabetes with formerly good control per A1C 6.4, now jumping up to 9%.  She does not sound to be carbohdyrate counting at home.  At risk for antibiotic induced diarrhea with cipro and flagyl on board.  Recommend use of probiotic and encourage use of yogurt as well.  Provided with low fiber education materials proactively.    Nutrition Related Findings:    no malnutrition indices. Wound Type: None       Current Nutrition Intake & Therapies:    Average Meal Intake: NPO  Average Supplements Intake: NPO  Diet NPO    Anthropometric Measures:  Height: 157.5 cm (5' 2\")  Ideal Body Weight (IBW): 110 lbs (50 kg)    Admission Body Weight: 91.4 kg (201 lb 8 oz)  Current Body Weight: 91 kg (200 lb 11.2 oz), 182.5 % IBW. Weight Source: Bed Scale  Current BMI (kg/m2): 36.7  Usual Body Weight: 91.2 kg (201 lb) (increasing over time)  % Weight Change (Calculated): -0.1  Weight Adjustment For: No Adjustment                 BMI Categories: Obese Class 2 (BMI 35.0 -39.9)    Estimated Daily Nutrient Needs:  Energy Requirements Based On: Kcal/kg  Weight Used for Energy

## 2024-02-01 NOTE — PROGRESS NOTES
Spiritual Services Interventions  0318/0318-01   2/1/2024  Marquez Raza    Shelley Jordan  53 y.o. year old female    Encounter Summary  Encounter Overview/Reason : (P) Initial Encounter  Service Provided For:: (P) Patient  Referral/Consult From:: (P) Rounding  Last Encounter : (P) 02/01/24  Complexity of Encounter: (P) Moderate  Begin Time: (P) 1030  End Time : (P) 1045  Total Time Calculated: (P) 15 min     Spiritual/Emotional needs  Type: (P) Spiritual Support                    Assessment/Intervention/Outcome  Assessment: (P) Calm  Intervention: (P) Discussed belief system/Muslim practices/flori, Discussed illness injury and it’s impact  Outcome: (P) Encouraged, Engaged in conversation

## 2024-02-01 NOTE — CARE COORDINATION
Case Management Assessment  Initial Evaluation    Date/Time of Evaluation: 2/1/2024 11:12 AM  Assessment Completed by: Sandra EDMONDS LSW     If patient is discharged prior to next notation, then this note serves as note for discharge by case management.    Patient Name: Shelley Jordan                   YOB: 1970  Diagnosis: Acute colitis [K52.9]                   Date / Time: 1/31/2024  9:55 AM    Patient Admission Status: Inpatient   Readmission Risk (Low < 19, Mod (19-27), High > 27): Readmission Risk Score: 6.3    Current PCP: Raul Messina MD  PCP verified by CM? Yes    Chart Reviewed: Yes      History Provided by: Patient  Patient Orientation: Alert and Oriented, Person, Place, Situation    Patient Cognition: Alert    Hospitalization in the last 30 days (Readmission):  No    If yes, Readmission Assessment in CM Navigator will be completed.    Advance Directives:      Code Status: Full Code   Patient's Primary Decision Maker is: Patient Declined (Legal Next of Kin Remains as Decision Maker) (Pt would like her children to make her medical decisions as she does not feel that spouse could ever follow through.)      Discharge Planning:    Patient lives with: Spouse/Significant Other Type of Home: House  Primary Care Giver: Self  Patient Support Systems include: Spouse/Significant Other, Children   Current Financial resources: Other (Comment) (BCBS)  Current community resources: None  Current services prior to admission: None            Current DME:  Hearing aides            Type of Home Care services:  None    ADLS  Prior functional level: Independent in ADLs/IADLs  Current functional level: Independent in ADLs/IADLs    PT AM-PAC:   /24  OT AM-PAC:   /24    Family can provide assistance at DC: Yes  Would you like Case Management to discuss the discharge plan with any other family members/significant others, and if so, who? No  Plans to Return to Present Housing: Yes  Other Identified  Issues/Barriers to RETURNING to current housing: None  Potential Assistance needed at discharge: N/A            Potential DME:  None  Patient expects to discharge to: House  Plan for transportation at discharge: Family    Financial    Payor: BCBS / Plan: BCBS - [place] PPO / Product Type: *No Product type* /     Does insurance require precert for SNF: Yes    Potential assistance Purchasing Medications: No        [id number] - [place], [place] - [address] [phone] - F [phone]  [address] [facility] [place] [address]  Phone: [phone] Fax: [phone]      Notes:    Factors facilitating achievement of predicted outcomes: Family support, Motivated, Cooperative, and Pleasant    Barriers to discharge: Pain    Additional Case Management Notes: SW met with pt to complete assessment. Pt is alert and oriented and pleasant with assessment. Pt is a 53 year old  female admitted for acute colitis. Pt lives with her spouse in their home in [place]. Pt uses no DME or community services. Pt does wear hearing aides. Pt drives and is able to get herself to appointments.     Pt is a full code and follows with Dr Messina as PCP. Pt does not have advance directives but reports that her children would be her decision makers as she does not feel that her spouse would ever be able to take her off the vent if that would happen. Pt understands without documents, that spouse would be legal next of kin. Pt reports that her medications are covered well by insurance. Pt plans to return home at discharge. Pt identifies no discharge needs or concerns. SW will remain available as needed. Sandra Galvin MS LSW 2/1/2024     The Plan for Transition of Care is related to the following treatment goals of Acute colitis [K52.9]    The Patient and/or Patient Representative Agree with the Discharge Plan?  yes    Sandra Galvin MS LSW   Case Management Department  Ph: [phone] Fax: [phone]

## 2024-02-01 NOTE — PROGRESS NOTES
Surgery called at this time and states tentative pickup time for patient is 1300. Patient and family aware.

## 2024-02-01 NOTE — CONSULTS
Gastroenterology Consult Note    Patient:   Shelley Jordan   Admit date:  2024  Facility:   [facility]  Referring/PCP: Raul Messina MD  Date:     2024  Consultant:   JORDIN PLASCENCIA MD, MD    Subjective:     This 53 y.o. female was admitted 2024 with a diagnosis of \"Acute colitis [K52.9]\" and is seen in consultation regarding   Chief Complaint   Patient presents with    Rectal Bleeding     Patient with constipations starting , had a BM. She now reports that she has seen multiple blood clots with stool approx. 6-7 episodes. since then.         GI Bleeding  HPI  Shelley Jordan is a 53-year-old woman with a past medical history of diabetes mellitus type 2, hypertension, hyperlipidemia who presents with a complaint of sudden onset abdominal pain on 2024.  She reports that the pain was sharp and in her left upper quadrant.  She reports that she had a bowel movement which was normal brown-colored stool and the subsequent bowel movement was bright red blood.  She reports that she was passing clots.  She denies any syncopal episode.  Her last colonoscopy was on 10/8/2021 with diverticulosis no polyps were identified and she was to return for colonoscopy in 10 years.  A CT scan performed in the ED showed acute colitis involving the left colon from the splenic flexure down to the sigmoid.  She denies any prior episode of rectal bleeding and reports that she has not had a repeat episode of the rectal bleeding since her admission.    Colonoscopy 10/08/2021  Diverticulosis  No polyps  Return in 10 years    Past Medical History:  Past Medical History:   Diagnosis Date    Diabetes mellitus (HCC)     [place] (hard of hearing)     PHI HEARING AIDS    Hyperlipidemia     Hypertension     PONV (postoperative nausea and vomiting)        Past Surgical History:  Past Surgical History:   Procedure Laterality Date    APPENDECTOMY  1979     SECTION  2003    COLONOSCOPY  10/08/2021     [facility]-Dr. Yao    DILATION AND CURETTAGE OF UTERUS N/A 2020    DILATATION AND CURETTAGE HYSTEROSCOPY CAUTERY ABLATION, NOVASURE performed by Branden Martinez MD at [facility] OR    ENDOMETRIAL ABLATION      HYSTERECTOMY (CERVIX STATUS UNKNOWN)  2022    VAGINAL LAPAROSCOPIC ROBOTOIC ASSISTED-BSO    HYSTERECTOMY, VAGINAL N/A 2022    HYSTERECTOMY VAGINAL LAPAROSCOPIC ROBOTIC ASSISTED - BSO performed by Rosalia Pendleton DO at [facility] OR    SAUL      Dr. Martinez - vaginal wart removal    TUBAL LIGATION  2004       Social History:  Social History     Tobacco Use    Smoking status: Never    Smokeless tobacco: Never   Substance Use Topics    Alcohol use: No       Family History:   Family History   Problem Relation Age of Onset    Heart Disease Paternal Grandfather 50         in his early 50's from heart or aneurysm    Diabetes Maternal Grandfather     Heart Attack Maternal Grandfather 86    Hypertension Father     Kidney Cancer Father 64    Lung Cancer Father 76         at age 76 from recurrent lung cancer    Heart Surgery Father     Heart Disease Father     Diabetes Mother     Hypertension Mother     Hypertension Sister     Mental Illness Brother         schizophrenia and bipolar disorder    Hypertension Brother        Diet:  Diet NPO    Scheduled Medications:    Dulaglutide  3 mg SubCUTAneous Weekly    atorvastatin  20 mg Oral Daily    lisinopril  10 mg Oral Daily    amLODIPine  10 mg Oral QAM    alogliptin  25 mg Oral Daily    sodium chloride flush  10 mL IntraVENous 2 times per day    famotidine  20 mg Oral BID    ciprofloxacin  400 mg IntraVENous Q12H    metroNIDAZOLE  500 mg IntraVENous Q8H     Infusions:    dextrose      sodium chloride 125 mL/hr at 24 0941    sodium chloride       PRN Medications: glucose, dextrose bolus **OR** dextrose bolus, glucagon (rDNA), dextrose, sodium chloride flush, sodium chloride, potassium chloride **OR** potassium alternative oral replacement  questions.

## 2024-02-01 NOTE — PROGRESS NOTES
Patient in chair, watching television.  present at bedside. Patient educated on medication to be given tonight and physician's orders to be completed. Patient stated understanding. Patient encouraged to ask questions. Patient denies of any questions at this time.    Vitals taken and documented. See flow sheet for details. Assessment completed and documented. Patient alert, oriented x4. Calm, pleasant. Speech clear. Lung sounds clear throughout. No cough noted. Abdomen rounded, distended, soft, tenderness to palpation in all four quadrants. Bowel sounds active in all four quadrants. Generalized non-pitting edema. Patient denies of pain, chest pain, numbness, tingling, or shortness of breath. Patient denies needs or concerns at this time. Call light and over bed table in reach.

## 2024-02-02 ENCOUNTER — APPOINTMENT (OUTPATIENT)
Dept: CT IMAGING | Age: 54
DRG: 395 | End: 2024-02-02
Payer: COMMERCIAL

## 2024-02-02 VITALS
HEART RATE: 81 BPM | BODY MASS INDEX: 37.16 KG/M2 | TEMPERATURE: 99 F | WEIGHT: 201.94 LBS | OXYGEN SATURATION: 98 % | RESPIRATION RATE: 16 BRPM | SYSTOLIC BLOOD PRESSURE: 132 MMHG | DIASTOLIC BLOOD PRESSURE: 67 MMHG | HEIGHT: 62 IN

## 2024-02-02 LAB
ANION GAP SERPL CALCULATED.3IONS-SCNC: 9 MMOL/L (ref 9–17)
BASOPHILS # BLD: 0.07 K/UL (ref 0–0.2)
BASOPHILS NFR BLD: 1 % (ref 0–2)
BUN SERPL-MCNC: 4 MG/DL (ref 6–20)
BUN/CREAT SERPL: 8 (ref 9–20)
CALCIUM SERPL-MCNC: 8.2 MG/DL (ref 8.6–10.4)
CAMPYLOBACTER DNA SPEC NAA+PROBE: NORMAL
CHLORIDE SERPL-SCNC: 105 MMOL/L (ref 98–107)
CO2 SERPL-SCNC: 24 MMOL/L (ref 20–31)
CREAT SERPL-MCNC: 0.5 MG/DL (ref 0.5–0.9)
CRP SERPL HS-MCNC: 42.5 MG/L (ref 0–5)
EOSINOPHIL # BLD: 0.36 K/UL (ref 0–0.44)
EOSINOPHILS RELATIVE PERCENT: 3 % (ref 1–4)
ERYTHROCYTE [DISTWIDTH] IN BLOOD BY AUTOMATED COUNT: 13.2 % (ref 11.8–14.4)
ERYTHROCYTE [SEDIMENTATION RATE] IN BLOOD BY PHOTOMETRIC METHOD: 33 MM/HR (ref 0–30)
ETEC ELTA+ESTB GENES STL QL NAA+PROBE: NORMAL
GFR SERPL CREATININE-BSD FRML MDRD: >60 ML/MIN/1.73M2
GLUCOSE BLD-MCNC: 164 MG/DL (ref 74–100)
GLUCOSE BLD-MCNC: 187 MG/DL (ref 74–100)
GLUCOSE SERPL-MCNC: 151 MG/DL (ref 70–99)
HCT VFR BLD AUTO: 34.5 % (ref 36.3–47.1)
HGB BLD-MCNC: 10.9 G/DL (ref 11.9–15.1)
IMM GRANULOCYTES # BLD AUTO: 0.06 K/UL (ref 0–0.3)
IMM GRANULOCYTES NFR BLD: 1 %
LYMPHOCYTES NFR BLD: 2.44 K/UL (ref 1.1–3.7)
LYMPHOCYTES RELATIVE PERCENT: 19 % (ref 24–43)
MCH RBC QN AUTO: 27.9 PG (ref 25.2–33.5)
MCHC RBC AUTO-ENTMCNC: 31.6 G/DL (ref 28.4–34.8)
MCV RBC AUTO: 88.2 FL (ref 82.6–102.9)
MONOCYTES NFR BLD: 0.87 K/UL (ref 0.1–1.2)
MONOCYTES NFR BLD: 7 % (ref 3–12)
NEUTROPHILS NFR BLD: 70 % (ref 36–65)
NEUTS SEG NFR BLD: 8.98 K/UL (ref 1.5–8.1)
NRBC BLD-RTO: 0 PER 100 WBC
P SHIGELLOIDES DNA STL QL NAA+PROBE: NORMAL
PLATELET # BLD AUTO: 263 K/UL (ref 138–453)
PMV BLD AUTO: 10 FL (ref 8.1–13.5)
POTASSIUM SERPL-SCNC: 3.8 MMOL/L (ref 3.7–5.3)
RBC # BLD AUTO: 3.91 M/UL (ref 3.95–5.11)
SALMONELLA DNA SPEC QL NAA+PROBE: NORMAL
SHIGA TOXIN STX GENE SPEC NAA+PROBE: NORMAL
SHIGELLA DNA SPEC QL NAA+PROBE: NORMAL
SODIUM SERPL-SCNC: 138 MMOL/L (ref 135–144)
SPECIMEN DESCRIPTION: NORMAL
V CHOL+PARA RFBL+TRKH+TNAA STL QL NAA+PR: NORMAL
WBC OTHER # BLD: 12.8 K/UL (ref 3.5–11.3)
Y ENTERO RECN STL QL NAA+PROBE: NORMAL

## 2024-02-02 PROCEDURE — G0378 HOSPITAL OBSERVATION PER HR: HCPCS

## 2024-02-02 PROCEDURE — 2580000003 HC RX 258: Performed by: INTERNAL MEDICINE

## 2024-02-02 PROCEDURE — 96366 THER/PROPH/DIAG IV INF ADDON: CPT

## 2024-02-02 PROCEDURE — 82947 ASSAY GLUCOSE BLOOD QUANT: CPT

## 2024-02-02 PROCEDURE — 6360000004 HC RX CONTRAST MEDICATION: Performed by: INTERNAL MEDICINE

## 2024-02-02 PROCEDURE — 6360000002 HC RX W HCPCS: Performed by: INTERNAL MEDICINE

## 2024-02-02 PROCEDURE — 86140 C-REACTIVE PROTEIN: CPT

## 2024-02-02 PROCEDURE — 85025 COMPLETE CBC W/AUTO DIFF WBC: CPT

## 2024-02-02 PROCEDURE — 80048 BASIC METABOLIC PNL TOTAL CA: CPT

## 2024-02-02 PROCEDURE — 85652 RBC SED RATE AUTOMATED: CPT

## 2024-02-02 PROCEDURE — 6370000000 HC RX 637 (ALT 250 FOR IP): Performed by: INTERNAL MEDICINE

## 2024-02-02 PROCEDURE — 36415 COLL VENOUS BLD VENIPUNCTURE: CPT

## 2024-02-02 PROCEDURE — 74174 CTA ABD&PLVS W/CONTRAST: CPT

## 2024-02-02 RX ORDER — METRONIDAZOLE 500 MG/1
500 TABLET ORAL 3 TIMES DAILY
Qty: 30 TABLET | Refills: 0 | Status: SHIPPED | OUTPATIENT
Start: 2024-02-02 | End: 2024-02-12

## 2024-02-02 RX ORDER — CIPROFLOXACIN 500 MG/1
500 TABLET, FILM COATED ORAL 2 TIMES DAILY
Qty: 20 TABLET | Refills: 0 | Status: SHIPPED | OUTPATIENT
Start: 2024-02-02 | End: 2024-02-12

## 2024-02-02 RX ADMIN — AMLODIPINE BESYLATE 10 MG: 10 TABLET ORAL at 08:28

## 2024-02-02 RX ADMIN — METRONIDAZOLE 500 MG: 500 INJECTION, SOLUTION INTRAVENOUS at 08:34

## 2024-02-02 RX ADMIN — SODIUM CHLORIDE, PRESERVATIVE FREE 10 ML: 5 INJECTION INTRAVENOUS at 08:28

## 2024-02-02 RX ADMIN — CIPROFLOXACIN 400 MG: 400 INJECTION, SOLUTION INTRAVENOUS at 02:28

## 2024-02-02 RX ADMIN — FAMOTIDINE 20 MG: 20 TABLET, FILM COATED ORAL at 08:28

## 2024-02-02 RX ADMIN — CIPROFLOXACIN 400 MG: 400 INJECTION, SOLUTION INTRAVENOUS at 12:52

## 2024-02-02 RX ADMIN — SODIUM CHLORIDE: 9 INJECTION, SOLUTION INTRAVENOUS at 02:27

## 2024-02-02 RX ADMIN — IOPAMIDOL 100 ML: 755 INJECTION, SOLUTION INTRAVENOUS at 07:25

## 2024-02-02 RX ADMIN — METRONIDAZOLE 500 MG: 500 INJECTION, SOLUTION INTRAVENOUS at 01:12

## 2024-02-02 RX ADMIN — ATORVASTATIN CALCIUM 20 MG: 20 TABLET, FILM COATED ORAL at 08:28

## 2024-02-02 ASSESSMENT — PAIN SCALES - GENERAL
PAINLEVEL_OUTOF10: 0

## 2024-02-02 NOTE — PROGRESS NOTES
Discharge instructions reviewed with patient and  at this time. Patient and  are aware of new medications and follow up appointments. Patient and  verbalize understanding, deny questions at this time. Will escort patient to private car shortly.

## 2024-02-02 NOTE — DISCHARGE INSTRUCTIONS
Patient Instructions:   Activity: activity as tolerated  Diet: clear liquids, advance as tolerated     Follow up:  Patient will be followed by Raul Messina MD in 1-2 weeks

## 2024-02-02 NOTE — PROGRESS NOTES
Pt reassessed and VS rechecked as charted. Antibiotic started. Pt states she currently has no pain but occasionally get short  intermittent pain to abdomen but denies any need for prn medication at this time. Pt denies any other needs at this time and has call light within reach.

## 2024-02-02 NOTE — DISCHARGE INSTR - DIET

## 2024-02-02 NOTE — PROGRESS NOTES
Pt in bed with visitor at bedside. VS and assessment as charted. Pt is A&Ox4, denies any current pain. Some abdominal tenderness noted as well as distention. Pt denies any current needs at this time and has call light within reach, will continue to monitor.

## 2024-02-02 NOTE — PLAN OF CARE
Problem: Discharge Planning  Goal: Discharge to home or other facility with appropriate resources  Outcome: Progressing     Problem: Pain  Goal: Verbalizes/displays adequate comfort level or baseline comfort level  Outcome: Progressing  Flowsheets (Taken 2/1/2024 2024)  Verbalizes/displays adequate comfort level or baseline comfort level:   Encourage patient to monitor pain and request assistance   Implement non-pharmacological measures as appropriate and evaluate response   Administer analgesics based on type and severity of pain and evaluate response   Assess pain using appropriate pain scale     Problem: Safety - Adult  Goal: Free from fall injury  Outcome: Progressing     Problem: ABCDS Injury Assessment  Goal: Absence of physical injury  Outcome: Progressing     Problem: Nutrition Deficit:  Goal: Optimize nutritional status  2/1/2024 2024 by Yani High RN  Outcome: Progressing     Problem: Chronic Conditions and Co-morbidities  Goal: Patient's chronic conditions and co-morbidity symptoms are monitored and maintained or improved  Outcome: Progressing     Problem: Gastrointestinal - Adult  Goal: Maintains or returns to baseline bowel function  Outcome: Progressing  Flowsheets (Taken 2/1/2024 2024)  Maintains or returns to baseline bowel function:   Assess bowel function   Administer IV fluids as ordered to ensure adequate hydration

## 2024-02-02 NOTE — PROGRESS NOTES
-----------------------------------------------------------------    Diagnostic Data:      Complete Blood Count:   Recent Labs     01/31/24  1056 02/01/24  0550 02/02/24  0545   WBC 21.1* 19.3* 12.8*   RBC 4.59 4.03 3.91*   HGB 12.7 11.3* 10.9*   HCT 40.0 35.3* 34.5*   MCV 87.1 87.6 88.2   MCH 27.7 28.0 27.9   MCHC 31.8 32.0 31.6   RDW 13.2 13.3 13.2    261 263   MPV 9.5 9.9 10.0        Last 3 Blood Glucose:   Recent Labs     01/31/24  1056 02/01/24  0550 02/02/24  0545   GLUCOSE 204* 186* 151*        Comprehensive Metabolic Profile:   Recent Labs     01/31/24  1056 02/01/24  0550 02/02/24  0545    135 138   K 3.9 3.6* 3.8    101 105   CO2 26 22 24   BUN 12 8 4*   CREATININE 0.5 0.5 0.5   GLUCOSE 204* 186* 151*   CALCIUM 9.0 8.4* 8.2*   PROT 7.5  --   --    LABALBU 4.1  --   --    BILITOT 1.1  --   --    ALKPHOS 110*  --   --    AST 13  --   --    ALT 18  --   --         Urinalysis:   Lab Results   Component Value Date/Time    NITRU NEGATIVE 05/10/2022 05:06 PM    COLORU Yellow 05/10/2022 05:06 PM    PHUR 5.5 05/10/2022 05:06 PM    WBCUA 0 TO 2 05/10/2022 05:06 PM    RBCUA None 05/10/2022 05:06 PM    MUCUS NOT REPORTED 07/26/2021 05:20 PM    TRICHOMONAS NOT REPORTED 07/26/2021 05:20 PM    YEAST NOT REPORTED 07/26/2021 05:20 PM    BACTERIA 1+ 05/10/2022 05:06 PM    CLARITYU Slightly Cloudy 03/26/2021 11:09 AM    SPECGRAV >1.030 05/10/2022 05:06 PM    LEUKOCYTESUR NEGATIVE 05/10/2022 05:06 PM    UROBILINOGEN Normal 05/10/2022 05:06 PM    BILIRUBINUR NEGATIVE 05/10/2022 05:06 PM    BILIRUBINUR Negative 03/26/2021 11:09 AM    BLOODU Positive 03/26/2021 11:09 AM    GLUCOSEU 2+ 05/10/2022 05:06 PM    KETUA TRACE 05/10/2022 05:06 PM    AMORPHOUS 1+ 05/10/2022 05:06 PM       HgBA1c:    Lab Results   Component Value Date/Time    LABA1C 9.0 01/31/2024 10:56 AM       Lactic Acid: No results found for: \"LACTA\"     Troponin: No results for input(s): \"TROPONINI\" in the last 72 hours.    CRP:    Recent Labs     today  Medications:   Continue Januvia, diluglutide  Hypoglycemia protocol     Nutrition status:   morbid obesity  Dietician consult initiated     Hospital Prophylaxis:   DVT: none due to rectal bleed    Stress Ulcer: PPI      Disposition:  Shared decision making: All test results, treatment options and disposition options were discussed with the patient today  Social determinants of health that may impact management: none  Code status: Full Code   Disposition: Discharge plan is pending    [facility] Advanced Care Planning documentation:  [x] I have confirmed that the patient's Advance Care Plan is present, Code Status is documented, or surrogate decision maker is listed in the patient's medical record  [If \"yes\", STOP HERE]     [] The patient's Advance Care Plan is NOT present because:    []  I confirmed today that the patient does not wish or was not able to name a   surrogate decision maker or provide and advance care plan.    [] Hospice care is currently being provided or has been provided within the   calendar year.    []  I did NOT confirm today the presence of an Advance Care Plan or surrogate   decision maker documented within the patient's medical record.   [DOES NOT SATISFY [facility] PERFORMANCE]    BISHNU Dodd - CNP , BISHNU, NP-C  Hospitalist Medicine        2/2/2024, 9:19 AM

## 2024-02-02 NOTE — DISCHARGE SUMMARY
Discharge Summary    Shelley Jordan  :  1970  MRN:  [id number]    Admit date:  2024      Discharge date: 2024     Admitting Physician:  Raul Messina MD    Discharge Diagnoses:    Principal Problem:    Acute colitis  Active Problems:    Type 2 diabetes mellitus not at goal (HCC)    Hypertension    Ischemic colitis (HCC)  Resolved Problems:    * No resolved hospital problems. *      Hospital Course:   Shelley Jordan is a 53 y.o. female admitted with acute colitis.  She presented to the emergency room with complaints of rectal bleeding and left lower quadrant abdominal pain.  Symptom onset began last week.  Patient reported on  she felt as though she was constipated and reported that she had bowel movements by Monday but was hard.  She reported that she passed some blood clots approximately 6-7 episodes.  She stated that she did call her primary care and x-ray was completed but negative for any acute findings and she was advised to take MiraLAX for constipation.  Patient stated that her stools softened up however she continued to pass blood.  Patient  reported that he thought she became pale and was diaphoretic while sitting on the toilet.  She denied any nausea or vomiting.  She denied fever or chills.  During patient's evaluation rectal exam was completed while in the emergency room and patient was found to have a small anal fissure at the 6 o'clock position with a little bit of blood and a small external hemorrhoid but no bleeding.  They reported presence of bright red blood on the fingertip upon rectal exam but no tenderness or internal hemorrhoids were palpable.  Radiology studies were concerning for acute colitis involving the left colon from the splenic flexure down to the sigmoid.  Patient was also found to have inflammation and thickening as well as pericolonic mesenteric inflammation.  Patient reported she does have history of hypertension and recently had her blood pressure  vaginal cream  Commonly known as: ESTRACE VAGINAL  Place a pea-sized amount vaginally nightly for one month, then use 3 nights per week thereafter. Do NOT use plastic applicator.     TRUEplus Lancets 33G Misc  USE ONCE A DAY               Where to Get Your Medications        These medications were sent to [id number] - [place], [place] - [address] - P [phone] - F [phone] [address] [address]      Phone: [phone]   ciprofloxacin 500 MG tablet  metroNIDAZOLE 500 MG tablet         Patient Instructions:   Activity: activity as tolerated  Diet: clear liquids, advance as tolerated  Wound Care: none needed  Other: None    Disposition:   Discharge to Home    Follow up:  Patient will be followed by Raul Messina MD in 1-2 weeks    CORE MEASURES on Discharge (if applicable)  ACE/ARB in CHF: NA  Statin in MI: NA  ASA in MI: NA  Statin in CVA: NA  Antiplatelet in CVA: NA    Total time spent on discharge services: 40 minutes    Including the following activities:  Evaluation and Management of patient  Discussion with patient and/or surrogate about current care plan  Coordination with Case Management and/or   Coordination of care with Consultants (if applicable)   Coordination of care with Receiving Facility Physician (if applicable)  Completion of DME forms (if applicable)  Preparation of Discharge Summary  Preparation of Medication Reconciliation  Preparation of Discharge Prescriptions    Signed:  BISHNU Dodd - CNP, BISHNU, NP-C  2/2/2024, 1:53 PM

## 2024-02-02 NOTE — PLAN OF CARE
Problem: Discharge Planning  Goal: Discharge to home or other facility with appropriate resources  Outcome: Completed     Problem: Pain  Goal: Verbalizes/displays adequate comfort level or baseline comfort level  Outcome: Completed     Problem: Safety - Adult  Goal: Free from fall injury  Outcome: Completed     Problem: ABCDS Injury Assessment  Goal: Absence of physical injury  Outcome: Completed     Problem: Nutrition Deficit:  Goal: Optimize nutritional status  Outcome: Completed     Problem: Chronic Conditions and Co-morbidities  Goal: Patient's chronic conditions and co-morbidity symptoms are monitored and maintained or improved  Outcome: Completed     Problem: Gastrointestinal - Adult  Goal: Maintains or returns to baseline bowel function  Outcome: Completed

## 2024-02-02 NOTE — PROGRESS NOTES
Shift assessment and vitals obtained at this time as charted. Vitals WNL, patient denies pain at this time. Patient is alert and oriented x4. Lung sounds clear throughout. Abdomen is soft, rounded and tender with slight distention noted. Active bowel sounds noted throughout. Assessment otherwise as charted, see flowsheets. Patient is resting in the bed with call light in reach, denies other needs at this time. Care ongoing.

## 2024-02-03 LAB — CALPROTECTIN, FECAL: 1280 UG/G

## 2024-02-06 LAB — SURGICAL PATHOLOGY REPORT: NORMAL

## 2024-02-09 ENCOUNTER — HOSPITAL ENCOUNTER (OUTPATIENT)
Age: 54
Discharge: HOME | End: 2024-02-09
Payer: COMMERCIAL

## 2024-02-09 DIAGNOSIS — D64.9 LOW HEMOGLOBIN: ICD-10-CM

## 2024-02-09 LAB
ERYTHROCYTE [DISTWIDTH] IN BLOOD BY AUTOMATED COUNT: 13.2 % (ref 11.8–14.4)
HCT VFR BLD AUTO: 40.2 % (ref 36.3–47.1)
HGB BLD-MCNC: 13.1 G/DL (ref 11.9–15.1)
MCH RBC QN AUTO: 28.1 PG (ref 25.2–33.5)
MCHC RBC AUTO-ENTMCNC: 32.6 G/DL (ref 28.4–34.8)
MCV RBC AUTO: 86.3 FL (ref 82.6–102.9)
NRBC BLD-RTO: 0 PER 100 WBC
PLATELET # BLD AUTO: 365 K/UL (ref 138–453)
PMV BLD AUTO: 9.8 FL (ref 8.1–13.5)
RBC # BLD AUTO: 4.66 M/UL (ref 3.95–5.11)
WBC OTHER # BLD: 13.1 K/UL (ref 3.5–11.3)

## 2024-02-09 PROCEDURE — 85027 COMPLETE CBC AUTOMATED: CPT

## 2024-02-09 PROCEDURE — 36415 COLL VENOUS BLD VENIPUNCTURE: CPT

## 2024-02-17 ENCOUNTER — HOSPITAL ENCOUNTER (OUTPATIENT)
Age: 54
Discharge: HOME | End: 2024-02-17
Payer: COMMERCIAL

## 2024-02-17 DIAGNOSIS — E11.9 CONTROLLED TYPE 2 DIABETES MELLITUS WITHOUT COMPLICATION, WITHOUT LONG-TERM CURRENT USE OF INSULIN (HCC): ICD-10-CM

## 2024-02-17 LAB
ALBUMIN SERPL-MCNC: 4.4 G/DL (ref 3.5–5.2)
ALBUMIN/GLOB SERPL: 1.3 {RATIO} (ref 1–2.5)
ALP SERPL-CCNC: 102 U/L (ref 35–104)
ALT SERPL-CCNC: 36 U/L (ref 5–33)
ANION GAP SERPL CALCULATED.3IONS-SCNC: 10 MMOL/L (ref 9–17)
AST SERPL-CCNC: 34 U/L
BASOPHILS # BLD: 0.09 K/UL (ref 0–0.2)
BASOPHILS NFR BLD: 1 % (ref 0–2)
BILIRUB SERPL-MCNC: 0.7 MG/DL (ref 0.3–1.2)
BUN SERPL-MCNC: 11 MG/DL (ref 6–20)
BUN/CREAT SERPL: 18 (ref 9–20)
CALCIUM SERPL-MCNC: 9.3 MG/DL (ref 8.6–10.4)
CHLORIDE SERPL-SCNC: 100 MMOL/L (ref 98–107)
CHOLEST SERPL-MCNC: 156 MG/DL
CHOLESTEROL/HDL RATIO: 3.4
CO2 SERPL-SCNC: 28 MMOL/L (ref 20–31)
CREAT SERPL-MCNC: 0.6 MG/DL (ref 0.5–0.9)
CREAT UR-MCNC: 123 MG/DL (ref 28–217)
EOSINOPHIL # BLD: 0.28 K/UL (ref 0–0.44)
EOSINOPHILS RELATIVE PERCENT: 3 % (ref 1–4)
ERYTHROCYTE [DISTWIDTH] IN BLOOD BY AUTOMATED COUNT: 13.3 % (ref 11.8–14.4)
GFR SERPL CREATININE-BSD FRML MDRD: >60 ML/MIN/1.73M2
GLUCOSE SERPL-MCNC: 200 MG/DL (ref 70–99)
HCT VFR BLD AUTO: 41.6 % (ref 36.3–47.1)
HDLC SERPL-MCNC: 46 MG/DL
HGB BLD-MCNC: 12.9 G/DL (ref 11.9–15.1)
IMM GRANULOCYTES # BLD AUTO: 0.03 K/UL (ref 0–0.3)
IMM GRANULOCYTES NFR BLD: 0 %
LDLC SERPL CALC-MCNC: 88 MG/DL (ref 0–130)
LYMPHOCYTES NFR BLD: 2.6 K/UL (ref 1.1–3.7)
LYMPHOCYTES RELATIVE PERCENT: 24 % (ref 24–43)
MCH RBC QN AUTO: 27.7 PG (ref 25.2–33.5)
MCHC RBC AUTO-ENTMCNC: 31 G/DL (ref 28.4–34.8)
MCV RBC AUTO: 89.3 FL (ref 82.6–102.9)
MICROALBUMIN UR-MCNC: <12 MG/L
MICROALBUMIN/CREAT UR-RTO: NORMAL MCG/MG CREAT
MONOCYTES NFR BLD: 0.86 K/UL (ref 0.1–1.2)
MONOCYTES NFR BLD: 8 % (ref 3–12)
NEUTROPHILS NFR BLD: 64 % (ref 36–65)
NEUTS SEG NFR BLD: 6.8 K/UL (ref 1.5–8.1)
NRBC BLD-RTO: 0 PER 100 WBC
PLATELET # BLD AUTO: 359 K/UL (ref 138–453)
PMV BLD AUTO: 9.9 FL (ref 8.1–13.5)
POTASSIUM SERPL-SCNC: 4.3 MMOL/L (ref 3.7–5.3)
PROT SERPL-MCNC: 7.9 G/DL (ref 6.4–8.3)
RBC # BLD AUTO: 4.66 M/UL (ref 3.95–5.11)
SODIUM SERPL-SCNC: 138 MMOL/L (ref 135–144)
TRIGL SERPL-MCNC: 109 MG/DL
WBC OTHER # BLD: 10.7 K/UL (ref 3.5–11.3)

## 2024-02-17 PROCEDURE — 82043 UR ALBUMIN QUANTITATIVE: CPT

## 2024-02-17 PROCEDURE — 80061 LIPID PANEL: CPT

## 2024-02-17 PROCEDURE — 80053 COMPREHEN METABOLIC PANEL: CPT

## 2024-02-17 PROCEDURE — 85025 COMPLETE CBC W/AUTO DIFF WBC: CPT

## 2024-02-17 PROCEDURE — 36415 COLL VENOUS BLD VENIPUNCTURE: CPT

## 2024-02-17 PROCEDURE — 83036 HEMOGLOBIN GLYCOSYLATED A1C: CPT

## 2024-02-17 PROCEDURE — 82570 ASSAY OF URINE CREATININE: CPT

## 2024-02-18 LAB
EST. AVERAGE GLUCOSE BLD GHB EST-MCNC: 203 MG/DL
HBA1C MFR BLD: 8.7 % (ref 4–6)

## 2024-04-14 ENCOUNTER — APPOINTMENT (OUTPATIENT)
Dept: CT IMAGING | Age: 54
DRG: 373 | End: 2024-04-14
Payer: COMMERCIAL

## 2024-04-14 ENCOUNTER — HOSPITAL ENCOUNTER (INPATIENT)
Age: 54
LOS: 4 days | Discharge: HOME | DRG: 373 | End: 2024-04-18
Attending: EMERGENCY MEDICINE | Admitting: INTERNAL MEDICINE
Payer: COMMERCIAL

## 2024-04-14 DIAGNOSIS — I10 PRIMARY HYPERTENSION: ICD-10-CM

## 2024-04-14 DIAGNOSIS — K52.9 COLITIS: Primary | ICD-10-CM

## 2024-04-14 DIAGNOSIS — K55.9 ISCHEMIC COLITIS (HCC): ICD-10-CM

## 2024-04-14 LAB
ALBUMIN SERPL-MCNC: 4.2 G/DL (ref 3.5–5.2)
ALBUMIN/GLOB SERPL: 1.1 {RATIO} (ref 1–2.5)
ALP SERPL-CCNC: 109 U/L (ref 35–104)
ALT SERPL-CCNC: 21 U/L (ref 5–33)
AMORPH SED URNS QL MICRO: NORMAL
ANION GAP SERPL CALCULATED.3IONS-SCNC: 12 MMOL/L (ref 9–17)
AST SERPL-CCNC: 16 U/L
BACTERIA URNS QL MICRO: NORMAL
BASOPHILS # BLD: 0.08 K/UL (ref 0–0.2)
BASOPHILS NFR BLD: 0 % (ref 0–2)
BILIRUB SERPL-MCNC: 0.7 MG/DL (ref 0.3–1.2)
BILIRUB UR QL STRIP: NEGATIVE
BILIRUB UR QL STRIP: NORMAL
BUN SERPL-MCNC: 15 MG/DL (ref 6–20)
BUN/CREAT SERPL: 25 (ref 9–20)
CALCIUM SERPL-MCNC: 9.2 MG/DL (ref 8.6–10.4)
CASTS #/AREA URNS LPF: NORMAL /LPF (ref 0–2)
CHARACTER UR: NORMAL
CHLORIDE SERPL-SCNC: 102 MMOL/L (ref 98–107)
CLARITY UR: CLEAR
CLARITY UR: NORMAL
CO2 SERPL-SCNC: 22 MMOL/L (ref 20–31)
COLOR UR: NORMAL
COLOR UR: YELLOW
COMMENT: NORMAL
CREAT SERPL-MCNC: 0.6 MG/DL (ref 0.5–0.9)
CRP SERPL HS-MCNC: 7.1 MG/L (ref 0–5)
CRYSTALS URNS MICRO: NORMAL /HPF
EOSINOPHIL # BLD: 0.04 K/UL (ref 0–0.44)
EOSINOPHILS RELATIVE PERCENT: 0 % (ref 1–4)
EPI CELLS #/AREA URNS HPF: ABNORMAL /HPF (ref 0–25)
EPI CELLS #/AREA URNS HPF: NORMAL /HPF (ref 0–5)
ERYTHROCYTE [DISTWIDTH] IN BLOOD BY AUTOMATED COUNT: 13.6 % (ref 11.8–14.4)
ERYTHROCYTE [SEDIMENTATION RATE] IN BLOOD BY PHOTOMETRIC METHOD: 29 MM/HR (ref 0–30)
GFR SERPL CREATININE-BSD FRML MDRD: >90 ML/MIN/1.73M2
GLUCOSE SERPL-MCNC: 227 MG/DL (ref 70–99)
GLUCOSE UR STRIP-MCNC: NEGATIVE MG/DL
GLUCOSE UR STRIP-MCNC: NORMAL MG/DL
HCT VFR BLD AUTO: 38.4 % (ref 36.3–47.1)
HGB BLD-MCNC: 12.9 G/DL (ref 11.9–15.1)
HGB UR QL STRIP.AUTO: NEGATIVE
HGB UR QL STRIP.AUTO: NORMAL
IMM GRANULOCYTES # BLD AUTO: 0.11 K/UL (ref 0–0.3)
IMM GRANULOCYTES NFR BLD: 1 %
KETONES UR STRIP-MCNC: NEGATIVE MG/DL
KETONES UR STRIP-MCNC: NORMAL MG/DL
LACTATE BLDV-SCNC: 1.6 MMOL/L (ref 0.5–2.2)
LACTATE BLDV-SCNC: 2.4 MMOL/L (ref 0.5–2.2)
LEUKOCYTE ESTERASE UR QL STRIP: NEGATIVE
LEUKOCYTE ESTERASE UR QL STRIP: NORMAL
LIPASE SERPL-CCNC: 27 U/L (ref 13–60)
LYMPHOCYTES NFR BLD: 1.79 K/UL (ref 1.1–3.7)
LYMPHOCYTES RELATIVE PERCENT: 9 % (ref 24–43)
MCH RBC QN AUTO: 28.9 PG (ref 25.2–33.5)
MCHC RBC AUTO-ENTMCNC: 33.6 G/DL (ref 28.4–34.8)
MCV RBC AUTO: 86.1 FL (ref 82.6–102.9)
MONOCYTES NFR BLD: 1.26 K/UL (ref 0.1–1.2)
MONOCYTES NFR BLD: 6 % (ref 3–12)
MUCOUS THREADS URNS QL MICRO: ABNORMAL
MUCOUS THREADS URNS QL MICRO: NORMAL
NEUTROPHILS NFR BLD: 84 % (ref 36–65)
NEUTS SEG NFR BLD: 16.65 K/UL (ref 1.5–8.1)
NITRITE UR QL STRIP: NEGATIVE
NITRITE UR QL STRIP: NORMAL
NRBC BLD-RTO: 0 PER 100 WBC
PH UR STRIP: 6 [PH] (ref 5–9)
PH UR STRIP: NORMAL [PH] (ref 5–8)
PLATELET # BLD AUTO: 310 K/UL (ref 138–453)
PMV BLD AUTO: 10.1 FL (ref 8.1–13.5)
POTASSIUM SERPL-SCNC: 4.2 MMOL/L (ref 3.7–5.3)
PROT SERPL-MCNC: 7.9 G/DL (ref 6.4–8.3)
PROT UR STRIP-MCNC: NEGATIVE MG/DL
PROT UR STRIP-MCNC: NORMAL MG/DL
RBC # BLD AUTO: 4.46 M/UL (ref 3.95–5.11)
RBC #/AREA URNS HPF: ABNORMAL /HPF (ref 0–2)
RBC #/AREA URNS HPF: NORMAL /HPF (ref 0–2)
RENAL EPITHELIAL, UA: NORMAL /HPF
SODIUM SERPL-SCNC: 136 MMOL/L (ref 135–144)
SP GR UR STRIP: 1.01 (ref 1.01–1.02)
SP GR UR STRIP: NORMAL (ref 1–1.03)
TRICHOMONAS #/AREA URNS HPF: NORMAL /[HPF]
TSH SERPL DL<=0.05 MIU/L-ACNC: 0.89 UIU/ML (ref 0.3–5)
UROBILINOGEN UR STRIP-ACNC: NORMAL EU/DL (ref 0–1)
UROBILINOGEN UR STRIP-ACNC: NORMAL EU/DL (ref 0–1)
WBC #/AREA URNS HPF: ABNORMAL /HPF (ref 0–5)
WBC #/AREA URNS HPF: NORMAL /HPF (ref 0–5)
WBC OTHER # BLD: 19.9 K/UL (ref 3.5–11.3)
YEAST URNS QL MICRO: NORMAL

## 2024-04-14 PROCEDURE — 85025 COMPLETE CBC W/AUTO DIFF WBC: CPT

## 2024-04-14 PROCEDURE — 83690 ASSAY OF LIPASE: CPT

## 2024-04-14 PROCEDURE — 87324 CLOSTRIDIUM AG IA: CPT

## 2024-04-14 PROCEDURE — 6360000002 HC RX W HCPCS: Performed by: STUDENT IN AN ORGANIZED HEALTH CARE EDUCATION/TRAINING PROGRAM

## 2024-04-14 PROCEDURE — 2580000003 HC RX 258: Performed by: EMERGENCY MEDICINE

## 2024-04-14 PROCEDURE — 81001 URINALYSIS AUTO W/SCOPE: CPT

## 2024-04-14 PROCEDURE — 2580000003 HC RX 258: Performed by: STUDENT IN AN ORGANIZED HEALTH CARE EDUCATION/TRAINING PROGRAM

## 2024-04-14 PROCEDURE — 80053 COMPREHEN METABOLIC PANEL: CPT

## 2024-04-14 PROCEDURE — 87493 C DIFF AMPLIFIED PROBE: CPT

## 2024-04-14 PROCEDURE — 94761 N-INVAS EAR/PLS OXIMETRY MLT: CPT

## 2024-04-14 PROCEDURE — 6360000002 HC RX W HCPCS: Performed by: EMERGENCY MEDICINE

## 2024-04-14 PROCEDURE — 83605 ASSAY OF LACTIC ACID: CPT

## 2024-04-14 PROCEDURE — 83993 ASSAY FOR CALPROTECTIN FECAL: CPT

## 2024-04-14 PROCEDURE — 74176 CT ABD & PELVIS W/O CONTRAST: CPT

## 2024-04-14 PROCEDURE — 96374 THER/PROPH/DIAG INJ IV PUSH: CPT

## 2024-04-14 PROCEDURE — 1200000000 HC SEMI PRIVATE

## 2024-04-14 PROCEDURE — 87506 IADNA-DNA/RNA PROBE TQ 6-11: CPT

## 2024-04-14 PROCEDURE — 85652 RBC SED RATE AUTOMATED: CPT

## 2024-04-14 PROCEDURE — 99285 EMERGENCY DEPT VISIT HI MDM: CPT

## 2024-04-14 PROCEDURE — 36415 COLL VENOUS BLD VENIPUNCTURE: CPT

## 2024-04-14 PROCEDURE — 86140 C-REACTIVE PROTEIN: CPT

## 2024-04-14 PROCEDURE — 84443 ASSAY THYROID STIM HORMONE: CPT

## 2024-04-14 PROCEDURE — 87449 NOS EACH ORGANISM AG IA: CPT

## 2024-04-14 PROCEDURE — 6370000000 HC RX 637 (ALT 250 FOR IP): Performed by: STUDENT IN AN ORGANIZED HEALTH CARE EDUCATION/TRAINING PROGRAM

## 2024-04-14 RX ORDER — ONDANSETRON 4 MG/1
4 TABLET, ORALLY DISINTEGRATING ORAL EVERY 8 HOURS PRN
Status: DISCONTINUED | OUTPATIENT
Start: 2024-04-14 | End: 2024-04-18 | Stop reason: HOSPADM

## 2024-04-14 RX ORDER — MAGNESIUM SULFATE IN WATER 40 MG/ML
2000 INJECTION, SOLUTION INTRAVENOUS PRN
Status: DISCONTINUED | OUTPATIENT
Start: 2024-04-14 | End: 2024-04-18 | Stop reason: HOSPADM

## 2024-04-14 RX ORDER — ACETAMINOPHEN 325 MG/1
650 TABLET ORAL EVERY 6 HOURS PRN
Status: DISCONTINUED | OUTPATIENT
Start: 2024-04-14 | End: 2024-04-18 | Stop reason: HOSPADM

## 2024-04-14 RX ORDER — POTASSIUM CHLORIDE 7.45 MG/ML
10 INJECTION INTRAVENOUS PRN
Status: DISCONTINUED | OUTPATIENT
Start: 2024-04-14 | End: 2024-04-18 | Stop reason: HOSPADM

## 2024-04-14 RX ORDER — DEXTROSE AND SODIUM CHLORIDE 5; .45 G/100ML; G/100ML
INJECTION, SOLUTION INTRAVENOUS CONTINUOUS
Status: ACTIVE | OUTPATIENT
Start: 2024-04-14 | End: 2024-04-16

## 2024-04-14 RX ORDER — SODIUM CHLORIDE 0.9 % (FLUSH) 0.9 %
10 SYRINGE (ML) INJECTION PRN
Status: DISCONTINUED | OUTPATIENT
Start: 2024-04-14 | End: 2024-04-18 | Stop reason: HOSPADM

## 2024-04-14 RX ORDER — POTASSIUM CHLORIDE 20 MEQ/1
40 TABLET, EXTENDED RELEASE ORAL PRN
Status: DISCONTINUED | OUTPATIENT
Start: 2024-04-14 | End: 2024-04-18 | Stop reason: HOSPADM

## 2024-04-14 RX ORDER — ONDANSETRON 2 MG/ML
4 INJECTION INTRAMUSCULAR; INTRAVENOUS EVERY 6 HOURS PRN
Status: DISCONTINUED | OUTPATIENT
Start: 2024-04-14 | End: 2024-04-18 | Stop reason: HOSPADM

## 2024-04-14 RX ORDER — SODIUM CHLORIDE 9 MG/ML
INJECTION, SOLUTION INTRAVENOUS PRN
Status: DISCONTINUED | OUTPATIENT
Start: 2024-04-14 | End: 2024-04-18 | Stop reason: HOSPADM

## 2024-04-14 RX ORDER — CIPROFLOXACIN 2 MG/ML
400 INJECTION, SOLUTION INTRAVENOUS ONCE
Status: COMPLETED | OUTPATIENT
Start: 2024-04-14 | End: 2024-04-14

## 2024-04-14 RX ORDER — METRONIDAZOLE 500 MG/100ML
500 INJECTION, SOLUTION INTRAVENOUS ONCE
Status: COMPLETED | OUTPATIENT
Start: 2024-04-14 | End: 2024-04-18

## 2024-04-14 RX ORDER — POLYETHYLENE GLYCOL 3350 17 G/17G
17 POWDER, FOR SOLUTION ORAL DAILY PRN
Status: DISCONTINUED | OUTPATIENT
Start: 2024-04-14 | End: 2024-04-18 | Stop reason: HOSPADM

## 2024-04-14 RX ORDER — SODIUM CHLORIDE 0.9 % (FLUSH) 0.9 %
10 SYRINGE (ML) INJECTION EVERY 12 HOURS SCHEDULED
Status: DISCONTINUED | OUTPATIENT
Start: 2024-04-14 | End: 2024-04-18 | Stop reason: HOSPADM

## 2024-04-14 RX ORDER — METRONIDAZOLE 500 MG/100ML
500 INJECTION, SOLUTION INTRAVENOUS EVERY 8 HOURS
Status: DISCONTINUED | OUTPATIENT
Start: 2024-04-14 | End: 2024-04-18 | Stop reason: HOSPADM

## 2024-04-14 RX ORDER — AMLODIPINE BESYLATE 10 MG/1
10 TABLET ORAL EVERY MORNING
Status: DISCONTINUED | OUTPATIENT
Start: 2024-04-15 | End: 2024-04-18 | Stop reason: HOSPADM

## 2024-04-14 RX ORDER — ATORVASTATIN CALCIUM 20 MG/1
20 TABLET, FILM COATED ORAL DAILY
Status: DISCONTINUED | OUTPATIENT
Start: 2024-04-14 | End: 2024-04-18 | Stop reason: HOSPADM

## 2024-04-14 RX ORDER — 0.9 % SODIUM CHLORIDE 0.9 %
1000 INTRAVENOUS SOLUTION INTRAVENOUS ONCE
Status: COMPLETED | OUTPATIENT
Start: 2024-04-14 | End: 2024-04-14

## 2024-04-14 RX ORDER — ALOGLIPTIN 25 MG/1
25 TABLET, FILM COATED ORAL DAILY
Status: DISCONTINUED | OUTPATIENT
Start: 2024-04-14 | End: 2024-04-18 | Stop reason: HOSPADM

## 2024-04-14 RX ORDER — LISINOPRIL 10 MG/1
10 TABLET ORAL NIGHTLY
Status: DISCONTINUED | OUTPATIENT
Start: 2024-04-14 | End: 2024-04-18 | Stop reason: HOSPADM

## 2024-04-14 RX ORDER — ACETAMINOPHEN 650 MG/1
650 SUPPOSITORY RECTAL EVERY 6 HOURS PRN
Status: DISCONTINUED | OUTPATIENT
Start: 2024-04-14 | End: 2024-04-18 | Stop reason: HOSPADM

## 2024-04-14 RX ORDER — LACTOBACILLUS RHAMNOSUS GG 10B CELL
1 CAPSULE ORAL
Status: DISCONTINUED | OUTPATIENT
Start: 2024-04-15 | End: 2024-04-18 | Stop reason: HOSPADM

## 2024-04-14 RX ORDER — CIPROFLOXACIN 2 MG/ML
400 INJECTION, SOLUTION INTRAVENOUS EVERY 12 HOURS
Status: DISCONTINUED | OUTPATIENT
Start: 2024-04-15 | End: 2024-04-18 | Stop reason: HOSPADM

## 2024-04-14 RX ADMIN — DEXTROSE AND SODIUM CHLORIDE: 5; 450 INJECTION, SOLUTION INTRAVENOUS at 17:47

## 2024-04-14 RX ADMIN — DEXTROSE AND SODIUM CHLORIDE: 5; 450 INJECTION, SOLUTION INTRAVENOUS at 17:51

## 2024-04-14 RX ADMIN — CIPROFLOXACIN 400 MG: 400 INJECTION, SOLUTION INTRAVENOUS at 13:50

## 2024-04-14 RX ADMIN — LISINOPRIL 10 MG: 10 TABLET ORAL at 20:25

## 2024-04-14 RX ADMIN — SODIUM CHLORIDE 1000 ML: 9 INJECTION, SOLUTION INTRAVENOUS at 11:04

## 2024-04-14 RX ADMIN — ALOGLIPTIN 25 MG: 25 TABLET, FILM COATED ORAL at 20:25

## 2024-04-14 RX ADMIN — METRONIDAZOLE 500 MG: 500 INJECTION, SOLUTION INTRAVENOUS at 16:46

## 2024-04-14 ASSESSMENT — PAIN SCALES - GENERAL
PAINLEVEL_OUTOF10: 0
PAINLEVEL_OUTOF10: 0

## 2024-04-14 ASSESSMENT — PAIN - FUNCTIONAL ASSESSMENT
PAIN_FUNCTIONAL_ASSESSMENT: NONE - DENIES PAIN
PAIN_FUNCTIONAL_ASSESSMENT: NONE - DENIES PAIN
PAIN_FUNCTIONAL_ASSESSMENT: 0-10

## 2024-04-14 NOTE — ED PROVIDER NOTES
[facility] ED  Emergency Department Encounter  Emergency Medicine Resident     Pt Name:Shelley Jordan  MRN: [id number]  Birthdate 1970  Date of evaluation: 24  PCP:  Raul Messina MD  10:52 AM EDT      CHIEF COMPLAINT       Chief Complaint   Patient presents with    Abdominal Pain     LLQ- started at 0400- last BM today, hx of n/v x1       HISTORY OF PRESENT ILLNESS  (Location/Symptom, Timing/Onset, Context/Setting, Quality, Duration, Modifying Factors, Severity.)      Shelley Jordan is a 53 y.o. female who presents with left sided abdominal pain, Left flank radiating into the left groind, felt worse this morning around 4 AM, took 400 mg motrin which has helped, and pain resolved at this time, but she reports it comes and goes. H/o \"ischemic colitis\" in January, and inpatient for 3 days, coloscopy almaguer at that time showing ischemic colitis, prior  but no other abdominal surgeries, multiple bm today, initially hard, but then waterny non bloody diarrhea,  Also single episode of emesis, patient does feel better at this time, no fevers.     PAST MEDICAL / SURGICAL / SOCIAL / FAMILY HISTORY      has a past medical history of Colitis, Diabetes mellitus (HCC), [place] (hard of hearing), Hyperlipidemia, Hypertension, and PONV (postoperative nausea and vomiting).       has a past surgical history that includes  section (2003); Appendectomy (); LEEP (); Tubal ligation (); Dilation and curettage of uterus (N/A, 2020); Endometrial ablation; Hysterectomy (2022); Hysterectomy, vaginal (N/A, 2022); Colonoscopy (10/08/2021); Colonoscopy (N/A, 2024); and Colonoscopy (2024).      Social History     Socioeconomic History    Marital status:      Spouse name: Not on file    Number of children: Not on file    Years of education: Not on file    Highest education level: Not on file   Occupational History    Not on file   Tobacco Use    Smoking

## 2024-04-14 NOTE — ED TRIAGE NOTES
Detail Level: Detailed Pt c/o LLQ- started at 0400- last BM today, hx of n/v x1   Quality 431: Preventive Care And Screening: Unhealthy Alcohol Use - Screening: Patient screened for unhealthy alcohol use using a single question and scores less than 2 times per year Quality 130: Documentation Of Current Medications In The Medical Record: Current Medications Documented Quality 402: Tobacco Use And Help With Quitting Among Adolescents: Patient screened for tobacco and never smoked

## 2024-04-14 NOTE — PROGRESS NOTES
Pt alert and oriented x4 resting comfortably in bed at this time. Vitals and assessment completed as charted. Pt denies any pain or current needs at this time. Call light is within reach, family at bedside. Care ongoing.

## 2024-04-14 NOTE — PROGRESS NOTES
Arrived to room 335 via ER bed, ambulated to room bed without issues. Oriented to staff, white board and call light.

## 2024-04-15 ENCOUNTER — APPOINTMENT (OUTPATIENT)
Age: 54
DRG: 373 | End: 2024-04-15
Attending: INTERNAL MEDICINE
Payer: COMMERCIAL

## 2024-04-15 ENCOUNTER — APPOINTMENT (OUTPATIENT)
Dept: CT IMAGING | Age: 54
DRG: 373 | End: 2024-04-15
Payer: COMMERCIAL

## 2024-04-15 PROBLEM — E66.9 CLASS 2 OBESITY: Status: ACTIVE | Noted: 2024-04-15

## 2024-04-15 PROBLEM — E66.812 CLASS 2 OBESITY: Status: ACTIVE | Noted: 2024-04-15

## 2024-04-15 LAB
ALBUMIN SERPL-MCNC: 3.7 G/DL (ref 3.5–5.2)
ALBUMIN/GLOB SERPL: 1.2 {RATIO} (ref 1–2.5)
ALP SERPL-CCNC: 92 U/L (ref 35–104)
ALT SERPL-CCNC: 16 U/L (ref 5–33)
ANION GAP SERPL CALCULATED.3IONS-SCNC: 12 MMOL/L (ref 9–17)
AST SERPL-CCNC: 11 U/L
BASOPHILS # BLD: 0.07 K/UL (ref 0–0.2)
BASOPHILS NFR BLD: 1 % (ref 0–2)
BILIRUB SERPL-MCNC: 0.7 MG/DL (ref 0.3–1.2)
BUN SERPL-MCNC: 7 MG/DL (ref 6–20)
BUN/CREAT SERPL: 12 (ref 9–20)
CALCIUM SERPL-MCNC: 8.7 MG/DL (ref 8.6–10.4)
CHLORIDE SERPL-SCNC: 106 MMOL/L (ref 98–107)
CO2 SERPL-SCNC: 22 MMOL/L (ref 20–31)
CREAT SERPL-MCNC: 0.6 MG/DL (ref 0.5–0.9)
CRP SERPL HS-MCNC: 21.3 MG/L (ref 0–5)
ECHO AR MAX VEL PISA: 4.8 M/S
ECHO AV CUSP MM: 2 CM
ECHO AV MEAN GRADIENT: 6 MMHG
ECHO AV MEAN VELOCITY: 1.1 M/S
ECHO AV PEAK GRADIENT: 11 MMHG
ECHO AV PEAK VELOCITY: 1.6 M/S
ECHO AV REGURGITANT PHT: 478 MS
ECHO AV VELOCITY RATIO: 0.56
ECHO AV VTI: 27.2 CM
ECHO BSA: 1.95 M2
ECHO EST RA PRESSURE: 3 MMHG
ECHO LA AREA 2C: 14.2 CM2
ECHO LA AREA 4C: 12.7 CM2
ECHO LA MAJOR AXIS: 4.8 CM
ECHO LA MINOR AXIS: 4.4 CM
ECHO LA VOL BP: 33 ML (ref 22–52)
ECHO LA VOL MOD A2C: 38 ML (ref 22–52)
ECHO LA VOL MOD A4C: 27 ML (ref 22–52)
ECHO LA VOL/BSA BIPLANE: 18 ML/M2 (ref 16–34)
ECHO LA VOLUME INDEX MOD A2C: 20 ML/M2 (ref 16–34)
ECHO LA VOLUME INDEX MOD A4C: 14 ML/M2 (ref 16–34)
ECHO LV E' LATERAL VELOCITY: 13 CM/S
ECHO LV EDV A2C: 43 ML
ECHO LV EDV A4C: 79 ML
ECHO LV EDV INDEX A4C: 42 ML/M2
ECHO LV EDV NDEX A2C: 23 ML/M2
ECHO LV EJECTION FRACTION A2C: 64 %
ECHO LV EJECTION FRACTION A4C: 70 %
ECHO LV EJECTION FRACTION BIPLANE: 68 % (ref 55–100)
ECHO LV ESV A2C: 15 ML
ECHO LV ESV A4C: 24 ML
ECHO LV ESV INDEX A2C: 8 ML/M2
ECHO LV ESV INDEX A4C: 13 ML/M2
ECHO LV FRACTIONAL SHORTENING: 32 % (ref 28–44)
ECHO LV INTERNAL DIMENSION DIASTOLE INDEX: 2.5 CM/M2
ECHO LV INTERNAL DIMENSION DIASTOLIC: 4.7 CM (ref 3.9–5.3)
ECHO LV INTERNAL DIMENSION SYSTOLIC INDEX: 1.7 CM/M2
ECHO LV INTERNAL DIMENSION SYSTOLIC: 3.2 CM
ECHO LV IVSD: 0.9 CM (ref 0.6–0.9)
ECHO LV MASS 2D: 142.7 G (ref 67–162)
ECHO LV MASS INDEX 2D: 75.9 G/M2 (ref 43–95)
ECHO LV POSTERIOR WALL DIASTOLIC: 0.9 CM (ref 0.6–0.9)
ECHO LV RELATIVE WALL THICKNESS RATIO: 0.38
ECHO LVOT AV VTI INDEX: 0.67
ECHO LVOT MEAN GRADIENT: 2 MMHG
ECHO LVOT PEAK GRADIENT: 3 MMHG
ECHO LVOT PEAK VELOCITY: 0.9 M/S
ECHO LVOT VTI: 18.2 CM
ECHO MV A VELOCITY: 0.83 M/S
ECHO MV E DECELERATION TIME (DT): 177 MS
ECHO MV E VELOCITY: 0.93 M/S
ECHO MV E/A RATIO: 1.12
ECHO MV E/E' LATERAL: 7.15
ECHO PV MAX VELOCITY: 1.1 M/S
ECHO PV PEAK GRADIENT: 5 MMHG
ECHO RIGHT VENTRICULAR SYSTOLIC PRESSURE (RVSP): 18 MMHG
ECHO TV REGURGITANT MAX VELOCITY: 1.93 M/S
ECHO TV REGURGITANT PEAK GRADIENT: 15 MMHG
EOSINOPHIL # BLD: 0.3 K/UL (ref 0–0.44)
EOSINOPHILS RELATIVE PERCENT: 3 % (ref 1–4)
ERYTHROCYTE [DISTWIDTH] IN BLOOD BY AUTOMATED COUNT: 13.9 % (ref 11.8–14.4)
ERYTHROCYTE [SEDIMENTATION RATE] IN BLOOD BY PHOTOMETRIC METHOD: 37 MM/HR (ref 0–30)
GFR SERPL CREATININE-BSD FRML MDRD: >90 ML/MIN/1.73M2
GLUCOSE SERPL-MCNC: 176 MG/DL (ref 70–99)
HCT VFR BLD AUTO: 34.2 % (ref 36.3–47.1)
HGB BLD-MCNC: 11.1 G/DL (ref 11.9–15.1)
IMM GRANULOCYTES # BLD AUTO: 0.03 K/UL (ref 0–0.3)
IMM GRANULOCYTES NFR BLD: 0 %
LDH SERPL-CCNC: 175 U/L (ref 135–214)
LYMPHOCYTES NFR BLD: 2.62 K/UL (ref 1.1–3.7)
LYMPHOCYTES RELATIVE PERCENT: 23 % (ref 24–43)
MCH RBC QN AUTO: 28.4 PG (ref 25.2–33.5)
MCHC RBC AUTO-ENTMCNC: 32.5 G/DL (ref 28.4–34.8)
MCV RBC AUTO: 87.5 FL (ref 82.6–102.9)
MONOCYTES NFR BLD: 0.85 K/UL (ref 0.1–1.2)
MONOCYTES NFR BLD: 7 % (ref 3–12)
NEUTROPHILS NFR BLD: 66 % (ref 36–65)
NEUTS SEG NFR BLD: 7.67 K/UL (ref 1.5–8.1)
NRBC BLD-RTO: 0 PER 100 WBC
PLATELET # BLD AUTO: 273 K/UL (ref 138–453)
PMV BLD AUTO: 9.6 FL (ref 8.1–13.5)
POTASSIUM SERPL-SCNC: 4.1 MMOL/L (ref 3.7–5.3)
PROT SERPL-MCNC: 6.7 G/DL (ref 6.4–8.3)
RBC # BLD AUTO: 3.91 M/UL (ref 3.95–5.11)
SODIUM SERPL-SCNC: 140 MMOL/L (ref 135–144)
WBC OTHER # BLD: 11.5 K/UL (ref 3.5–11.3)

## 2024-04-15 PROCEDURE — 94761 N-INVAS EAR/PLS OXIMETRY MLT: CPT

## 2024-04-15 PROCEDURE — 93306 TTE W/DOPPLER COMPLETE: CPT

## 2024-04-15 PROCEDURE — 74174 CTA ABD&PLVS W/CONTRAST: CPT

## 2024-04-15 PROCEDURE — 6370000000 HC RX 637 (ALT 250 FOR IP): Performed by: STUDENT IN AN ORGANIZED HEALTH CARE EDUCATION/TRAINING PROGRAM

## 2024-04-15 PROCEDURE — 83615 LACTATE (LD) (LDH) ENZYME: CPT

## 2024-04-15 PROCEDURE — 36415 COLL VENOUS BLD VENIPUNCTURE: CPT

## 2024-04-15 PROCEDURE — 80053 COMPREHEN METABOLIC PANEL: CPT

## 2024-04-15 PROCEDURE — 6360000004 HC RX CONTRAST MEDICATION: Performed by: INTERNAL MEDICINE

## 2024-04-15 PROCEDURE — 99223 1ST HOSP IP/OBS HIGH 75: CPT | Performed by: INTERNAL MEDICINE

## 2024-04-15 PROCEDURE — 6360000002 HC RX W HCPCS: Performed by: STUDENT IN AN ORGANIZED HEALTH CARE EDUCATION/TRAINING PROGRAM

## 2024-04-15 PROCEDURE — 1200000000 HC SEMI PRIVATE

## 2024-04-15 PROCEDURE — 99222 1ST HOSP IP/OBS MODERATE 55: CPT | Performed by: INTERNAL MEDICINE

## 2024-04-15 PROCEDURE — 85652 RBC SED RATE AUTOMATED: CPT

## 2024-04-15 PROCEDURE — 2580000003 HC RX 258: Performed by: STUDENT IN AN ORGANIZED HEALTH CARE EDUCATION/TRAINING PROGRAM

## 2024-04-15 PROCEDURE — 86140 C-REACTIVE PROTEIN: CPT

## 2024-04-15 PROCEDURE — 93306 TTE W/DOPPLER COMPLETE: CPT | Performed by: INTERNAL MEDICINE

## 2024-04-15 PROCEDURE — 85025 COMPLETE CBC W/AUTO DIFF WBC: CPT

## 2024-04-15 RX ADMIN — METRONIDAZOLE 500 MG: 500 INJECTION, SOLUTION INTRAVENOUS at 00:50

## 2024-04-15 RX ADMIN — LISINOPRIL 10 MG: 10 TABLET ORAL at 21:29

## 2024-04-15 RX ADMIN — DEXTROSE AND SODIUM CHLORIDE: 5; 450 INJECTION, SOLUTION INTRAVENOUS at 11:04

## 2024-04-15 RX ADMIN — AMLODIPINE BESYLATE 10 MG: 10 TABLET ORAL at 08:30

## 2024-04-15 RX ADMIN — METRONIDAZOLE 500 MG: 500 INJECTION, SOLUTION INTRAVENOUS at 17:03

## 2024-04-15 RX ADMIN — CIPROFLOXACIN 400 MG: 400 INJECTION, SOLUTION INTRAVENOUS at 02:54

## 2024-04-15 RX ADMIN — ATORVASTATIN CALCIUM 20 MG: 20 TABLET, FILM COATED ORAL at 08:30

## 2024-04-15 RX ADMIN — Medication 1 CAPSULE: at 08:30

## 2024-04-15 RX ADMIN — METRONIDAZOLE 500 MG: 500 INJECTION, SOLUTION INTRAVENOUS at 08:30

## 2024-04-15 RX ADMIN — CIPROFLOXACIN 400 MG: 400 INJECTION, SOLUTION INTRAVENOUS at 15:13

## 2024-04-15 RX ADMIN — IOPAMIDOL 100 ML: 755 INJECTION, SOLUTION INTRAVENOUS at 13:39

## 2024-04-15 RX ADMIN — ALOGLIPTIN 25 MG: 25 TABLET, FILM COATED ORAL at 21:29

## 2024-04-15 ASSESSMENT — PAIN DESCRIPTION - LOCATION: LOCATION: ABDOMEN

## 2024-04-15 ASSESSMENT — PAIN SCALES - GENERAL
PAINLEVEL_OUTOF10: 3
PAINLEVEL_OUTOF10: 0

## 2024-04-15 ASSESSMENT — PAIN DESCRIPTION - DESCRIPTORS: DESCRIPTORS: JABBING

## 2024-04-15 ASSESSMENT — PAIN - FUNCTIONAL ASSESSMENT: PAIN_FUNCTIONAL_ASSESSMENT: ACTIVITIES ARE NOT PREVENTED

## 2024-04-15 ASSESSMENT — PAIN DESCRIPTION - ONSET: ONSET: ON-GOING

## 2024-04-15 ASSESSMENT — PAIN DESCRIPTION - FREQUENCY: FREQUENCY: CONTINUOUS

## 2024-04-15 ASSESSMENT — PAIN DESCRIPTION - PAIN TYPE: TYPE: ACUTE PAIN

## 2024-04-15 ASSESSMENT — PAIN DESCRIPTION - ORIENTATION: ORIENTATION: LEFT

## 2024-04-15 NOTE — CARE COORDINATION
Case Management Assessment  Initial Evaluation    Date/Time of Evaluation: 4/15/2024 2:11 PM  Assessment Completed by: Tia Hopper RN    If patient is discharged prior to next notation, then this note serves as note for discharge by case management.    Patient Name: Shelley Jordan                   YOB: 1970  Diagnosis: Colitis [K52.9]                   Date / Time: 4/14/2024 10:36 AM    Patient Admission Status: Inpatient   Readmission Risk (Low < 19, Mod (19-27), High > 27): Readmission Risk Score: 9.8    Current PCP: Raul Messina MD  PCP verified by CM? (P) Yes    Chart Reviewed: Yes      History Provided by: (P) Patient, Medical Record  Patient Orientation: (P) Alert and Oriented    Patient Cognition: (P) Alert    Hospitalization in the last 30 days (Readmission):  No    If yes, Readmission Assessment in  Navigator will be completed.    Advance Directives:      Code Status: Full Code   Patient's Primary Decision Maker is: Patient Declined (Legal Next of Kin Remains as Decision Maker)      Discharge Planning:    Patient lives with: (P) Spouse/Significant Other Type of Home: (P) House  Primary Care Giver: (P) Self  Patient Support Systems include: (P) Spouse/Significant Other, Family Members, Friends/Neighbors, [employer] [facility]   Current Financial resources: (P) Other (Comment) (commercial insurance.)  Current community resources: (P) None  Current services prior to admission: (P) None            Current DME:              Type of Home Care services:  (P) None    ADLS  Prior functional level: (P) Independent in ADLs/IADLs  Current functional level: (P) Independent in ADLs/IADLs    PT AM-PAC:   /24  OT AM-PAC:   /24    Family can provide assistance at DC: (P) Yes  Would you like Case Management to discuss the discharge plan with any other family members/significant others, and if so, who? (P) No (states \"she can tell family members\")  Plans to Return to Present Housing: (P)  Yes  Other Identified Issues/Barriers to RETURNING to current housing: None  Potential Assistance needed at discharge: (P) N/A            Potential DME:    Patient expects to discharge to: (P) House  Plan for transportation at discharge:      Financial    Payor: BCBS / Plan: BCBS - [place] PPO / Product Type: *No Product type* /     Does insurance require precert for SNF: Yes    Potential assistance Purchasing Medications: (P) No  Meds-to-Beds request:        [facility] - [place], [place] - [address] [facility] [address] [phone] - F [phone]  [address] [facility] [address]  Phone: [phone] Fax: [phone]      Notes:    Factors facilitating achievement of predicted outcomes: Family support, Caregiver support, Friend support, Motivated, Cooperative, Pleasant, Sense of humor.    Barriers to discharge: None/    Additional Case Management Notes: Patient is alert and oriented, pleasant and cooperative.Patient is independent in ADL's and works outside the home. She drives and denies any difficulty obtaining medications or getting to appointments for follow up. Patient does not use any community services and denies need for assistive devices. Patient will return home with her  after discharge.    The Plan for Transition of Care is related to the following treatment goals of Colitis [K52.9]    IF APPLICABLE: The Patient and/or patient representative Shelley and her family were provided with a choice of provider and agrees with the discharge plan. Freedom of choice list with basic dialogue that supports the patient's individualized plan of care/goals and shares the quality data associated with the providers was provided to:     Patient Representative Name:       The Patient and/or Patient Representative Agree with the Discharge Plan?      Tia Hopper RN  Case Management Department  Ph:[phone]

## 2024-04-15 NOTE — PLAN OF CARE
Problem: Discharge Planning  Goal: Discharge to home or other facility with appropriate resources  Flowsheets (Taken 4/14/2024 2034)  Discharge to home or other facility with appropriate resources: Identify barriers to discharge with patient and caregiver     Problem: Safety - Adult  Goal: Free from fall injury  Flowsheets (Taken 4/14/2024 2034)  Free From Fall Injury: Instruct family/caregiver on patient safety

## 2024-04-15 NOTE — PROGRESS NOTES
Writer at bedside for shift assessment and vitals- patient A+O x4, calm and cooperative with assessment. Patient currently denies pain and discomfort. Bowels sounds active x4- no reports of nausea or GI upset. Respirations regular and unlabored, no reports of shortness of breath at this time. No additional requests, call light placed within reach, be din lowest position- plan of care on going./

## 2024-04-15 NOTE — CONSULTS
I, Lindy Mead am scribing for and in the presence of No name on file.    Patient: Shelley Jordan  : 1970  Date of Visit: April 15, 2024    REASON FOR VISIT / CONSULTATION: Abdominal Pain (LLQ- started at 0400- last BM today, hx of n/v x1)    History of Present Illness:        Dear Raul Messina MD,     I had the pleasure of seeing Shelley Jordan today. Ms. Jordan is a 53 y.o. female with colitis. She has never been a smoker. She has a history of hypertension and hyperlipidemia. No history of sleep apnea.    Her father has a history with heart disease, he was 76 when he passed.     Echo done today 4/15/24: Ejection fraction and wall motion.  No significant valvular abnormalities.  No cardiac source of the suspected ischemic bowel syndrome.  Telemetry reviewed, no evidence of atrial fibrillation.     Ms. Jordan was admitted for colitis and she is having a colonoscopy done on 24. She reports she has never had cardiac issues in the past. She reports she is doing well cardiac wise. She can walk about 2 miles on a good day. No chest pain, pressure or tightness. She does have shortness of breath while going up and down the stairs. She had light headedness and got clammy and had diarrhea and and constipation. Last time this happened she had blood in her stool but this time there was no blood in her stool. She was vomiting  night. She had a colonoscopy done last time in January. No history of sleep apnea. Her  reports she snores even though she says she does not. The last week or two she has had no appetite. No pain in her legs or thigh while walking around. She does get leg cramps when she is sleeping. She reports she is cold all the time. No fever.     she denied any current or recent chest pain, abdominal pain, bleeding problems, problems with her medications or any other concerns at this time.      PAST MEDICAL HISTORY:        Past Medical History:   Diagnosis Date    Colitis      kg/m²  Body mass index is 35.15 kg/m².    Constitutional: She is oriented to person, place, and time. She appears well-developed and well-nourished. In no acute distress.   HEENT: Normocephalic and atraumatic.No JVD present. Carotid bruit is not present. No mass and no thyromegaly present. No lymphadenopathy present.  Cardiovascular: Normal rate, regular rhythm, normal heart sounds. Exam reveals no gallop and no friction rubs. No murmur was heard..  Pulmonary/Chest: Effort normal and breath sounds normal. No respiratory distress. She has no wheezes, rhonchi or rales. Abdominal: Positive for left lower quadrant abdominal tenderness.  Otherwise soft.  No palpable organs.  Extremities: None. No cyanosis or clubbing. 2+ radial and carotid pulses. Distal extremity pulses: 2+ bilaterally.  Neurological: She is alert and oriented to person, place, and time. No evidence of gross cranial nerve deficit. Coordination appeared normal.   Skin: Skin is warm and dry. There is no rash or diaphoresis.   Psychiatric: She has a normal mood and affect. Her speech is normal and behavior is normal.        MOST RECENT LABS ON RECORD:   Lab Results   Component Value Date    WBC 11.5 (H) 04/15/2024    HGB 11.1 (L) 04/15/2024    HCT 34.2 (L) 04/15/2024     04/15/2024    CHOL 156 02/17/2024    TRIG 109 02/17/2024    HDL 46 02/17/2024    LDLDIRECT 35 03/30/2019    ALT 16 04/15/2024    AST 11 04/15/2024     04/15/2024    K 4.1 04/15/2024     04/15/2024    CREATININE 0.6 04/15/2024    BUN 7 04/15/2024    CO2 22 04/15/2024    TSH 0.89 04/14/2024    INR 1.0 02/16/2020    GLUF 160 (H) 07/14/2018    LABA1C 8.7 (H) 02/17/2024       ASSESSMENT:     1. Colitis    2. Ischemic colitis (HCC)    3. Primary hypertension    4- Dyslipidemia.  5-Uncontrolled diabetes.  6-Class II obesity.    PLAN:      Pre-Op Clearance: She is having a colonoscopy on 4/17/24 with Dr. Landers.  Pre-Operative Risk assessment using 2014 ACC/AHA guidelines

## 2024-04-15 NOTE — PROGRESS NOTES
Comprehensive Nutrition Assessment    Type and Reason for Visit:  Initial, Positive Nutrition Screen    Nutrition Recommendations/Plan:   Encourage oral fluids  Carb controlled low fiber diet goal.     Malnutrition Assessment:  Malnutrition Status:  At risk for malnutrition (Comment) (04/15/24 0642)    Context:  Acute Illness     Findings of the 6 clinical characteristics of malnutrition:  Energy Intake:  Mild decrease in energy intake (Comment)  Weight Loss:  No significant weight loss     Body Fat Loss:  No significant body fat loss     Muscle Mass Loss:  No significant muscle mass loss    Fluid Accumulation:  No significant fluid accumulation     Strength:  Not Performed    Nutrition Assessment:    Inadequate nutrient intakes r/t altered GI status, AEB recurrent GI symptoms and loss of appetite. No current n/v/d and reports lower fiber eating at home as instructed last hospitalization.  Poorer intakes of oral fluids divulged which may be exacerbating conditions of irregular bm and feeling \"tired\".  Slightly improved A1C from last hospitalization at 8.7 following the \"9\".  Will utilize Ensure Clear in place of Gucerna until diet progresses to full liquids or better.    Nutrition Related Findings:    + b/s. no edema. Wound Type: None       Current Nutrition Intake & Therapies:    Average Meal Intake: Unable to assess (no PO records)  Average Supplements Intake: Unable to assess (no records)  ADULT DIET; Clear Liquid  ADULT ORAL NUTRITION SUPPLEMENT; Breakfast, Lunch, Dinner; Diabetic Oral Supplement    Anthropometric Measures:  Height: 157.5 cm (5' 2\")  Ideal Body Weight (IBW): 110 lbs (50 kg)    Admission Body Weight: 87.5 kg (193 lb)  Current Body Weight: 87.2 kg (192 lb 4.8 oz), 174.8 % IBW. Weight Source: Bed Scale  Current BMI (kg/m2): 35.2  Usual Body Weight: 91.5 kg (201 lb 12.8 oz) (< 3 months ago)  % Weight Change (Calculated): -4.7  Weight Adjustment For: No Adjustment                 BMI Categories:

## 2024-04-15 NOTE — PROGRESS NOTES
[facility]  Inpatient/Observation/Outpatient Rehabilitation    Date: 4/15/2024  Patient Name: Shelley Jordan       [x] Inpatient Acute/Observation       []  Outpatient  : 1970     [] Pt no showed for scheduled appointment    [] Pt refused/declined therapy at this time due to:           [] Pt cancelled due to:  [] No Reason Given   [] Sick/ill   [] Other:    [] Evaluation held by RN/Provider due to:    [] High Heart Rate   [] High Blood Pressure   [] Orthopedic Consult   [] Hgb < 7   [] Other:    [x] Pt does not require skilled services due to: Pt is able to get up and move (I) throughout room without AD. Pt demo ability to complete UB & LB tasks at baseline. No further indication for skilled OT at this time.    Therapist/Assistant will attempt to see this patient, at our earliest opportunity.       Fantasma Alvarez, OT Date: 4/15/2024

## 2024-04-15 NOTE — PROGRESS NOTES
Vitals and assessment done at this time. See flow sheet for more details. Pt resting in bed. Pt denied any shortness of breath, dizziness/light headedness, numbness and tingling in hands and feet, and N/V/D. Pt stated she ishaving some abdominal tenderness in her LUQ and LLQ. Pt stated she is passing flatus at this time. Pt denied any further needs at this time. Call light within reach, will continue to monitor.

## 2024-04-15 NOTE — PROGRESS NOTES
Raul Messina M.D.  Internal Medicine Progress Note    Patient: Shelley Jordan  Date of Admission: 4/14/2024 10:36 AM  Date of Evaluation: 4/15/2024      SUBJECTIVE:    Shelley Jordan is a 53 y.o. female who was seen today along with Case Management for follow up of Colitis.  She  still c/o pain .  She has not had a BM since admission.  She denies any further nausea or vomiting.  She states she is feeling better today with Abx but still having pain.      ROS:   Constitutional: negative  for fevers, and negative for chills.  Respiratory: negative for shortness of breath, negative for cough, and negative for wheezing  Cardiovascular: negative for chest pain, and negative for palpitations  Gastrointestinal: positive for abdominal pain, positive for nausea,positive for vomiting, negative for diarrhea, and negative for constipation     All other systems were reviewed with the patient and are negative unless otherwise stated in HPI    -----------------------------------------------------------------  OBJECTIVE:  Vitals:   Temp: 97.5 °F (36.4 °C)  BP: 135/76  Respirations: 16  Pulse: 80  SpO2: 97 % on room air    Weight  Wt Readings from Last 3 Encounters:   04/15/24 87.2 kg (192 lb 4.8 oz)   02/26/24 87.5 kg (193 lb)   02/09/24 89.9 kg (198 lb 3.2 oz)     Body mass index is 35.17 kg/m².    24HR INTAKE/OUTPUT:      Intake/Output Summary (Last 24 hours) at 4/15/2024 0850  Last data filed at 4/15/2024 0254  Gross per 24 hour   Intake 2002 ml   Output 1400 ml   Net 602 ml       Exam:  GEN:    Awake, alert and oriented x 3.   EYES:  EOMI, pupils equal   NECK: Supple. No lymphadenopathy.  No carotid bruit  CVS:    regular rate and rhythm, no audible murmur  PULM:  CTA, no wheezes, rales or rhonchi, no acute respiratory distress  ABD:    Bowels sounds hypoactive.  Abdomen is soft.  No distention.   generalized  tenderness to palpation.   EXT:   no edema bilaterally .  No calf tenderness.   NEURO: Moves all extremities.  Motor

## 2024-04-16 ENCOUNTER — ANESTHESIA EVENT (OUTPATIENT)
Dept: OPERATING ROOM | Age: 54
DRG: 373 | End: 2024-04-16
Payer: COMMERCIAL

## 2024-04-16 LAB
ALBUMIN SERPL-MCNC: 3.9 G/DL (ref 3.5–5.2)
ALBUMIN/GLOB SERPL: 1.2 {RATIO} (ref 1–2.5)
ALP SERPL-CCNC: 94 U/L (ref 35–104)
ALT SERPL-CCNC: 16 U/L (ref 5–33)
ANION GAP SERPL CALCULATED.3IONS-SCNC: 11 MMOL/L (ref 9–17)
AST SERPL-CCNC: 14 U/L
BASOPHILS # BLD: 0.06 K/UL (ref 0–0.2)
BASOPHILS NFR BLD: 1 % (ref 0–2)
BILIRUB SERPL-MCNC: 0.6 MG/DL (ref 0.3–1.2)
BUN SERPL-MCNC: 5 MG/DL (ref 6–20)
BUN/CREAT SERPL: 10 (ref 9–20)
C DIFF GDH + TOXINS A+B STL QL IA.RAPID: ABNORMAL
CALCIUM SERPL-MCNC: 8.7 MG/DL (ref 8.6–10.4)
CHLORIDE SERPL-SCNC: 103 MMOL/L (ref 98–107)
CHOLEST SERPL-MCNC: 105 MG/DL (ref 0–199)
CHOLESTEROL/HDL RATIO: 2
CO2 SERPL-SCNC: 24 MMOL/L (ref 20–31)
CREAT SERPL-MCNC: 0.5 MG/DL (ref 0.5–0.9)
CRP SERPL HS-MCNC: 20.1 MG/L (ref 0–5)
EOSINOPHIL # BLD: 0.24 K/UL (ref 0–0.44)
EOSINOPHILS RELATIVE PERCENT: 2 % (ref 1–4)
ERYTHROCYTE [DISTWIDTH] IN BLOOD BY AUTOMATED COUNT: 13.5 % (ref 11.8–14.4)
ERYTHROCYTE [SEDIMENTATION RATE] IN BLOOD BY PHOTOMETRIC METHOD: 39 MM/HR (ref 0–30)
EST. AVERAGE GLUCOSE BLD GHB EST-MCNC: 171 MG/DL
GFR SERPL CREATININE-BSD FRML MDRD: >90 ML/MIN/1.73M2
GLUCOSE SERPL-MCNC: 209 MG/DL (ref 70–99)
HBA1C MFR BLD: 7.6 % (ref 4–6)
HCT VFR BLD AUTO: 35.6 % (ref 36.3–47.1)
HDLC SERPL-MCNC: 43 MG/DL
HGB BLD-MCNC: 11.8 G/DL (ref 11.9–15.1)
IMM GRANULOCYTES # BLD AUTO: 0.05 K/UL (ref 0–0.3)
IMM GRANULOCYTES NFR BLD: 0 %
LDLC SERPL CALC-MCNC: 45 MG/DL (ref 0–100)
LYMPHOCYTES NFR BLD: 1.72 K/UL (ref 1.1–3.7)
LYMPHOCYTES RELATIVE PERCENT: 14 % (ref 24–43)
MCH RBC QN AUTO: 28.9 PG (ref 25.2–33.5)
MCHC RBC AUTO-ENTMCNC: 33.1 G/DL (ref 28.4–34.8)
MCV RBC AUTO: 87.3 FL (ref 82.6–102.9)
MONOCYTES NFR BLD: 0.79 K/UL (ref 0.1–1.2)
MONOCYTES NFR BLD: 7 % (ref 3–12)
NEUTROPHILS NFR BLD: 76 % (ref 36–65)
NEUTS SEG NFR BLD: 9.24 K/UL (ref 1.5–8.1)
NRBC BLD-RTO: 0 PER 100 WBC
PLATELET # BLD AUTO: 294 K/UL (ref 138–453)
PMV BLD AUTO: 9.8 FL (ref 8.1–13.5)
POTASSIUM SERPL-SCNC: 3.8 MMOL/L (ref 3.7–5.3)
PROT SERPL-MCNC: 7.1 G/DL (ref 6.4–8.3)
RBC # BLD AUTO: 4.08 M/UL (ref 3.95–5.11)
SODIUM SERPL-SCNC: 138 MMOL/L (ref 135–144)
SPECIMEN DESCRIPTION: ABNORMAL
TRIGL SERPL-MCNC: 85 MG/DL
VLDLC SERPL CALC-MCNC: 17 MG/DL
WBC OTHER # BLD: 12.1 K/UL (ref 3.5–11.3)

## 2024-04-16 PROCEDURE — 6370000000 HC RX 637 (ALT 250 FOR IP): Performed by: INTERNAL MEDICINE

## 2024-04-16 PROCEDURE — 80053 COMPREHEN METABOLIC PANEL: CPT

## 2024-04-16 PROCEDURE — 2580000003 HC RX 258: Performed by: STUDENT IN AN ORGANIZED HEALTH CARE EDUCATION/TRAINING PROGRAM

## 2024-04-16 PROCEDURE — 85025 COMPLETE CBC W/AUTO DIFF WBC: CPT

## 2024-04-16 PROCEDURE — 99232 SBSQ HOSP IP/OBS MODERATE 35: CPT | Performed by: PHYSICIAN ASSISTANT

## 2024-04-16 PROCEDURE — 6360000002 HC RX W HCPCS: Performed by: STUDENT IN AN ORGANIZED HEALTH CARE EDUCATION/TRAINING PROGRAM

## 2024-04-16 PROCEDURE — 2580000003 HC RX 258

## 2024-04-16 PROCEDURE — 86140 C-REACTIVE PROTEIN: CPT

## 2024-04-16 PROCEDURE — 36415 COLL VENOUS BLD VENIPUNCTURE: CPT

## 2024-04-16 PROCEDURE — 94761 N-INVAS EAR/PLS OXIMETRY MLT: CPT

## 2024-04-16 PROCEDURE — 83036 HEMOGLOBIN GLYCOSYLATED A1C: CPT

## 2024-04-16 PROCEDURE — 1200000000 HC SEMI PRIVATE

## 2024-04-16 PROCEDURE — 6370000000 HC RX 637 (ALT 250 FOR IP): Performed by: STUDENT IN AN ORGANIZED HEALTH CARE EDUCATION/TRAINING PROGRAM

## 2024-04-16 PROCEDURE — 80061 LIPID PANEL: CPT

## 2024-04-16 PROCEDURE — 93005 ELECTROCARDIOGRAM TRACING: CPT | Performed by: INTERNAL MEDICINE

## 2024-04-16 PROCEDURE — 85652 RBC SED RATE AUTOMATED: CPT

## 2024-04-16 RX ORDER — DEXTROSE AND SODIUM CHLORIDE 5; .45 G/100ML; G/100ML
INJECTION, SOLUTION INTRAVENOUS CONTINUOUS
Status: ACTIVE | OUTPATIENT
Start: 2024-04-16 | End: 2024-04-17

## 2024-04-16 RX ADMIN — POLYETHYLENE GLYCOL-3350 AND ELECTROLYTES 4000 ML: 236; 6.74; 5.86; 2.97; 22.74 POWDER, FOR SOLUTION ORAL at 12:45

## 2024-04-16 RX ADMIN — ATORVASTATIN CALCIUM 20 MG: 20 TABLET, FILM COATED ORAL at 07:46

## 2024-04-16 RX ADMIN — AMLODIPINE BESYLATE 10 MG: 10 TABLET ORAL at 07:46

## 2024-04-16 RX ADMIN — CIPROFLOXACIN 400 MG: 400 INJECTION, SOLUTION INTRAVENOUS at 15:16

## 2024-04-16 RX ADMIN — Medication 1 CAPSULE: at 07:46

## 2024-04-16 RX ADMIN — ONDANSETRON 4 MG: 2 INJECTION INTRAMUSCULAR; INTRAVENOUS at 05:02

## 2024-04-16 RX ADMIN — DEXTROSE AND SODIUM CHLORIDE: 5; 450 INJECTION, SOLUTION INTRAVENOUS at 07:05

## 2024-04-16 RX ADMIN — LISINOPRIL 10 MG: 10 TABLET ORAL at 20:13

## 2024-04-16 RX ADMIN — ALOGLIPTIN 25 MG: 25 TABLET, FILM COATED ORAL at 20:13

## 2024-04-16 RX ADMIN — CIPROFLOXACIN 400 MG: 400 INJECTION, SOLUTION INTRAVENOUS at 03:26

## 2024-04-16 RX ADMIN — METRONIDAZOLE 500 MG: 500 INJECTION, SOLUTION INTRAVENOUS at 07:50

## 2024-04-16 RX ADMIN — DEXTROSE AND SODIUM CHLORIDE: 5; 450 INJECTION, SOLUTION INTRAVENOUS at 23:59

## 2024-04-16 RX ADMIN — METRONIDAZOLE 500 MG: 500 INJECTION, SOLUTION INTRAVENOUS at 16:54

## 2024-04-16 RX ADMIN — SODIUM CHLORIDE, PRESERVATIVE FREE 10 ML: 5 INJECTION INTRAVENOUS at 20:15

## 2024-04-16 RX ADMIN — METRONIDAZOLE 500 MG: 500 INJECTION, SOLUTION INTRAVENOUS at 00:01

## 2024-04-16 RX ADMIN — ONDANSETRON 4 MG: 2 INJECTION INTRAMUSCULAR; INTRAVENOUS at 16:53

## 2024-04-16 NOTE — PROGRESS NOTES
Vitals and assessment done at this time. See flowsheet for more details. Pt stated she is not having any pain or tenderness in her abdomen at this time. Pt stated about 5 this morning nausea hit her out of nowhere and she got sick. Pt stated since getting sick and having Zofran the nausea resolved. Pt denied any numbness or tingling in hands or feet, SOB, dizziness/light headedness. Pt denied any further needs at this time. Call light within reach, will continue to monitor.

## 2024-04-16 NOTE — PROGRESS NOTES
Raul Messina M.D.  Internal Medicine Progress Note    Patient: Shelley Jordan  Date of Admission: 4/14/2024 10:36 AM  Date of Evaluation: 4/16/2024      SUBJECTIVE:    Shelley Jordan is a 53 y.o. female who was seen today along with Case Management for follow up of Colitis.   She still has not had a BM since admission so GI panel and calprotectin are still pending.  She denies any further nausea or vomiting.  She states she is feeling better today but still having some mild LLQ pain.  She doesn't have much of an appetite.   said she didn't have much appetite for a couple weeks after her last bout of colitis, but soon after she started getting her appetite back last month she lost it again    ROS:   Constitutional: negative  for fevers, and negative for chills.  Respiratory: negative for shortness of breath, negative for cough, and negative for wheezing  Cardiovascular: negative for chest pain, and negative for palpitations  Gastrointestinal: positive for abdominal pain, positive for nausea,positive for vomiting, negative for diarrhea, and negative for constipation     All other systems were reviewed with the patient and are negative unless otherwise stated in HPI    -----------------------------------------------------------------  OBJECTIVE:  Vitals:   Temp: 98.5 °F (36.9 °C)  BP: 136/82  Respirations: 18  Pulse: 88  SpO2: 98 % on room air    Weight  Wt Readings from Last 3 Encounters:   04/16/24 87 kg (191 lb 12.8 oz)   02/26/24 87.5 kg (193 lb)   02/09/24 89.9 kg (198 lb 3.2 oz)     Body mass index is 35.07 kg/m².    24HR INTAKE/OUTPUT:      Intake/Output Summary (Last 24 hours) at 4/16/2024 1009  Last data filed at 4/16/2024 0529  Gross per 24 hour   Intake 3156 ml   Output 3350 ml   Net -194 ml       Exam:  GEN:    Awake, alert and oriented x 3.   EYES:  EOMI, pupils equal   NECK: Supple. No lymphadenopathy.  No carotid bruit  CVS:    regular rate and rhythm, no audible murmur  PULM:  CTA, no wheezes,  mmHg.  No prior studies were available for comparison.         MEDICAL DECISION MAKING:  Primary Problem(s): Colitis  Condition is  a recurrence of a previous problem  Treatment plan:   GI consult appreciated - discussed with Dr. Landers  Rec's cardiology w/u for ischemia  Plan to scope tomorrow  Golytely prep ordeered  Cardiology consult appreciated - discussed with Dr. Lemon  \"Low suspicion for ischemic colitis\" due to location and negative CTA findings  May order 1 month event monitor or CAM at DC to r/o arrythmia   Imaging:   CTA and echo from yesterday reviewed with Dr. Lemon  Medications:   Continue Cipro / Flagyl  Medication Monitoring / High Risk Medications: none      Diabetes mellitus    Condition is a chronic stable condition  Treatment plan:   Continue current treatment  Medications:   Continue Alogliptin (sub'd for Januvia)  , Trulicity    Nutrition status:   At risk for malnutrition  Dietician consult appreciated     Hospital Prophylaxis:   DVT: SCDs  Stress Ulcer: H2 Blocker     MDM Data:   Test interpretation:  My independent EKG interpretation: ECG not performed in ER but is ordered on the floor but still pending  My independent X-ray interpretation:  CTA shows no significant stenosis with small calcifications in aorta  Management and/or test interpretation discussed with Dr. Landers  Consults and Nursing notes were personally reviewed, all current labs and imaging were personally reviewed, and tests ordered: CBC, BMP, fecal calprotectin      Disposition:  Shared decision making: All test results, treatment options and disposition options were discussed with the patient and family members today  Social determinants of health that may impact management: none  Code status: Full Code   Disposition: Discharge plan is pending      Raul Messina MD , MMYRA.  4/16/2024  10:09 AM

## 2024-04-16 NOTE — PROGRESS NOTES
Pt  came to nurses station at this time worried about his wife.  asked why there are no answers and if they should go to a bigger hospital. Nurse informed  the plan of care at this time. He stated he understood. Call light within reach, will continue to monitor.

## 2024-04-16 NOTE — PROGRESS NOTES
Patient: Shelley Jordan  : 1970  Date of Visit: 2024    REASON FOR VISIT / CONSULTATION: Abdominal Pain (LLQ- started at 0400- last BM today, hx of n/v x1)    History of Present Illness:        Dear Raul Messina MD,     I had the pleasure of seeing Shelley Jordan today. Ms. Jordan is a 53 y.o. female with colitis. She has never been a smoker. She has a history of hypertension and hyperlipidemia. No history of sleep apnea.    Her father has a history with heart disease, he was 76 when he passed.     Echo done today 4/15/24: Ejection fraction and wall motion.  No significant valvular abnormalities.  No cardiac source of the suspected ischemic bowel syndrome.  Telemetry reviewed, no evidence of atrial fibrillation.     Ms. Jordan was admitted for colitis and she is having a colonoscopy done on 24. She reports she has never had cardiac issues in the past. She reports she is doing well cardiac wise. She can walk about 2 miles on a good day. No chest pain, pressure or tightness. She does have shortness of breath while going up and down the stairs. She had light headedness and got clammy and had diarrhea and and constipation. Last time this happened she had blood in her stool but this time there was no blood in her stool. She was vomiting  night. She had a colonoscopy done last time in January. No history of sleep apnea. Her  reports she snores even though she says she does not. The last week or two she has had no appetite. No pain in her legs or thigh while walking around. She does get leg cramps when she is sleeping. She reports she is cold all the time. No fever.     Today she reports she is doing okay. She completed her bowel prep. She denies any chest pain, pressure, or shortness of breath No lightheaded or dizziness. She is scheduled for the scope tomorrow.    she denied any current or recent chest pain, abdominal pain, bleeding problems, problems with her medications or

## 2024-04-16 NOTE — PROGRESS NOTES
Pt started feeling nauseous at this time. Pt has been sitting on the toilet for about an hour due to the bowel prep running through her. Pt started throwing up clear with white. Nurse had pt stop drinking bowel prep for now until her stomach is able to settle down. Pt denied any further needs at this time. Call light within reach, will continue to monitor.

## 2024-04-17 ENCOUNTER — ANESTHESIA (OUTPATIENT)
Dept: OPERATING ROOM | Age: 54
DRG: 373 | End: 2024-04-17
Payer: COMMERCIAL

## 2024-04-17 PROBLEM — R35.0 FREQUENCY OF URINATION: Status: RESOLVED | Noted: 2023-11-13 | Resolved: 2024-04-17

## 2024-04-17 PROBLEM — N39.0 FREQUENT UTI: Status: RESOLVED | Noted: 2023-11-13 | Resolved: 2024-04-17

## 2024-04-17 PROBLEM — K52.9 ACUTE COLITIS: Status: RESOLVED | Noted: 2024-01-31 | Resolved: 2024-04-17

## 2024-04-17 PROBLEM — I10 HYPERTENSION: Chronic | Status: ACTIVE | Noted: 2024-01-31

## 2024-04-17 PROBLEM — K52.9 COLITIS: Status: RESOLVED | Noted: 2024-04-14 | Resolved: 2024-04-17

## 2024-04-17 PROBLEM — A04.72 C. DIFFICILE COLITIS: Status: ACTIVE | Noted: 2024-04-17

## 2024-04-17 PROBLEM — K55.9 ISCHEMIC COLITIS (HCC): Status: RESOLVED | Noted: 2024-02-01 | Resolved: 2024-04-17

## 2024-04-17 PROBLEM — E78.5 DYSLIPIDEMIA: Chronic | Status: ACTIVE | Noted: 2017-06-07

## 2024-04-17 LAB
ALBUMIN SERPL-MCNC: 4 G/DL (ref 3.5–5.2)
ALBUMIN/GLOB SERPL: 1.1 {RATIO} (ref 1–2.5)
ALP SERPL-CCNC: 99 U/L (ref 35–104)
ALT SERPL-CCNC: 38 U/L (ref 5–33)
ANION GAP SERPL CALCULATED.3IONS-SCNC: 15 MMOL/L (ref 9–17)
AST SERPL-CCNC: 66 U/L
BASOPHILS # BLD: 0.1 K/UL (ref 0–0.2)
BASOPHILS NFR BLD: 1 % (ref 0–2)
BILIRUB SERPL-MCNC: 0.7 MG/DL (ref 0.3–1.2)
BUN SERPL-MCNC: 5 MG/DL (ref 6–20)
BUN/CREAT SERPL: 7 (ref 9–20)
C DIFFICILE TOXINS, PCR: ABNORMAL
CALCIUM SERPL-MCNC: 9 MG/DL (ref 8.6–10.4)
CAMPYLOBACTER DNA SPEC NAA+PROBE: NORMAL
CHLORIDE SERPL-SCNC: 98 MMOL/L (ref 98–107)
CO2 SERPL-SCNC: 23 MMOL/L (ref 20–31)
CREAT SERPL-MCNC: 0.7 MG/DL (ref 0.5–0.9)
CRP SERPL HS-MCNC: 14 MG/L (ref 0–5)
EKG ATRIAL RATE: 86 BPM
EKG P AXIS: 39 DEGREES
EKG P-R INTERVAL: 146 MS
EKG Q-T INTERVAL: 400 MS
EKG QRS DURATION: 86 MS
EKG QTC CALCULATION (BAZETT): 478 MS
EKG R AXIS: -12 DEGREES
EKG T AXIS: 48 DEGREES
EKG VENTRICULAR RATE: 86 BPM
EOSINOPHIL # BLD: 0.29 K/UL (ref 0–0.44)
EOSINOPHILS RELATIVE PERCENT: 2 % (ref 1–4)
ERYTHROCYTE [DISTWIDTH] IN BLOOD BY AUTOMATED COUNT: 13.4 % (ref 11.8–14.4)
ERYTHROCYTE [SEDIMENTATION RATE] IN BLOOD BY PHOTOMETRIC METHOD: 46 MM/HR (ref 0–30)
ETEC ELTA+ESTB GENES STL QL NAA+PROBE: NORMAL
GFR SERPL CREATININE-BSD FRML MDRD: >90 ML/MIN/1.73M2
GLUCOSE BLD-MCNC: 142 MG/DL (ref 74–100)
GLUCOSE SERPL-MCNC: 207 MG/DL (ref 70–99)
HCT VFR BLD AUTO: 37.6 % (ref 36.3–47.1)
HGB BLD-MCNC: 12.3 G/DL (ref 11.9–15.1)
IMM GRANULOCYTES # BLD AUTO: 0.04 K/UL (ref 0–0.3)
IMM GRANULOCYTES NFR BLD: 0 %
LYMPHOCYTES NFR BLD: 2.67 K/UL (ref 1.1–3.7)
LYMPHOCYTES RELATIVE PERCENT: 21 % (ref 24–43)
MAGNESIUM SERPL-MCNC: 1.6 MG/DL (ref 1.6–2.6)
MCH RBC QN AUTO: 28.5 PG (ref 25.2–33.5)
MCHC RBC AUTO-ENTMCNC: 32.7 G/DL (ref 28.4–34.8)
MCV RBC AUTO: 87 FL (ref 82.6–102.9)
MONOCYTES NFR BLD: 1.03 K/UL (ref 0.1–1.2)
MONOCYTES NFR BLD: 8 % (ref 3–12)
NEUTROPHILS NFR BLD: 68 % (ref 36–65)
NEUTS SEG NFR BLD: 8.78 K/UL (ref 1.5–8.1)
NRBC BLD-RTO: 0 PER 100 WBC
P SHIGELLOIDES DNA STL QL NAA+PROBE: NORMAL
PLATELET # BLD AUTO: 338 K/UL (ref 138–453)
PMV BLD AUTO: 10 FL (ref 8.1–13.5)
POTASSIUM SERPL-SCNC: 3.4 MMOL/L (ref 3.7–5.3)
PROT SERPL-MCNC: 7.6 G/DL (ref 6.4–8.3)
RBC # BLD AUTO: 4.32 M/UL (ref 3.95–5.11)
SALMONELLA DNA SPEC QL NAA+PROBE: NORMAL
SHIGA TOXIN STX GENE SPEC NAA+PROBE: NORMAL
SHIGELLA DNA SPEC QL NAA+PROBE: NORMAL
SODIUM SERPL-SCNC: 136 MMOL/L (ref 135–144)
SPECIMEN DESCRIPTION: ABNORMAL
SPECIMEN DESCRIPTION: NORMAL
V CHOL+PARA RFBL+TRKH+TNAA STL QL NAA+PR: NORMAL
WBC OTHER # BLD: 12.9 K/UL (ref 3.5–11.3)
Y ENTERO RECN STL QL NAA+PROBE: NORMAL

## 2024-04-17 PROCEDURE — 1200000000 HC SEMI PRIVATE

## 2024-04-17 PROCEDURE — 6370000000 HC RX 637 (ALT 250 FOR IP): Performed by: INTERNAL MEDICINE

## 2024-04-17 PROCEDURE — 6360000002 HC RX W HCPCS: Performed by: EMERGENCY MEDICINE

## 2024-04-17 PROCEDURE — 80053 COMPREHEN METABOLIC PANEL: CPT

## 2024-04-17 PROCEDURE — 85025 COMPLETE CBC W/AUTO DIFF WBC: CPT

## 2024-04-17 PROCEDURE — 93010 ELECTROCARDIOGRAM REPORT: CPT | Performed by: INTERNAL MEDICINE

## 2024-04-17 PROCEDURE — 82947 ASSAY GLUCOSE BLOOD QUANT: CPT

## 2024-04-17 PROCEDURE — 85652 RBC SED RATE AUTOMATED: CPT

## 2024-04-17 PROCEDURE — 2500000003 HC RX 250 WO HCPCS: Performed by: NURSE ANESTHETIST, CERTIFIED REGISTERED

## 2024-04-17 PROCEDURE — 6360000002 HC RX W HCPCS: Performed by: INTERNAL MEDICINE

## 2024-04-17 PROCEDURE — 83735 ASSAY OF MAGNESIUM: CPT

## 2024-04-17 PROCEDURE — 2580000003 HC RX 258

## 2024-04-17 PROCEDURE — 6360000002 HC RX W HCPCS: Performed by: NURSE ANESTHETIST, CERTIFIED REGISTERED

## 2024-04-17 PROCEDURE — 36415 COLL VENOUS BLD VENIPUNCTURE: CPT

## 2024-04-17 PROCEDURE — 6360000002 HC RX W HCPCS: Performed by: STUDENT IN AN ORGANIZED HEALTH CARE EDUCATION/TRAINING PROGRAM

## 2024-04-17 PROCEDURE — 86140 C-REACTIVE PROTEIN: CPT

## 2024-04-17 PROCEDURE — 99232 SBSQ HOSP IP/OBS MODERATE 35: CPT | Performed by: INTERNAL MEDICINE

## 2024-04-17 PROCEDURE — 94761 N-INVAS EAR/PLS OXIMETRY MLT: CPT

## 2024-04-17 RX ORDER — FAMOTIDINE 10 MG/ML
INJECTION, SOLUTION INTRAVENOUS PRN
Status: DISCONTINUED | OUTPATIENT
Start: 2024-04-17 | End: 2024-04-17 | Stop reason: HOSPADM

## 2024-04-17 RX ORDER — METOCLOPRAMIDE HYDROCHLORIDE 5 MG/ML
INJECTION INTRAMUSCULAR; INTRAVENOUS PRN
Status: DISCONTINUED | OUTPATIENT
Start: 2024-04-17 | End: 2024-04-17 | Stop reason: HOSPADM

## 2024-04-17 RX ORDER — SODIUM CHLORIDE, SODIUM LACTATE, POTASSIUM CHLORIDE, CALCIUM CHLORIDE 600; 310; 30; 20 MG/100ML; MG/100ML; MG/100ML; MG/100ML
INJECTION, SOLUTION INTRAVENOUS ONCE
Status: DISCONTINUED | OUTPATIENT
Start: 2024-04-17 | End: 2024-04-17 | Stop reason: HOSPADM

## 2024-04-17 RX ORDER — VANCOMYCIN HYDROCHLORIDE 125 MG/1
125 CAPSULE ORAL 4 TIMES DAILY
Status: DISCONTINUED | OUTPATIENT
Start: 2024-04-17 | End: 2024-04-18 | Stop reason: HOSPADM

## 2024-04-17 RX ORDER — ONDANSETRON 2 MG/ML
INJECTION INTRAMUSCULAR; INTRAVENOUS PRN
Status: DISCONTINUED | OUTPATIENT
Start: 2024-04-17 | End: 2024-04-17 | Stop reason: HOSPADM

## 2024-04-17 RX ADMIN — VANCOMYCIN HYDROCHLORIDE 125 MG: 125 CAPSULE ORAL at 16:52

## 2024-04-17 RX ADMIN — METRONIDAZOLE 500 MG: 500 INJECTION, SOLUTION INTRAVENOUS at 00:00

## 2024-04-17 RX ADMIN — CIPROFLOXACIN 400 MG: 400 INJECTION, SOLUTION INTRAVENOUS at 03:57

## 2024-04-17 RX ADMIN — METRONIDAZOLE 500 MG: 500 INJECTION, SOLUTION INTRAVENOUS at 12:25

## 2024-04-17 RX ADMIN — LISINOPRIL 10 MG: 10 TABLET ORAL at 20:43

## 2024-04-17 RX ADMIN — FAMOTIDINE 20 MG: 10 INJECTION, SOLUTION INTRAVENOUS at 16:03

## 2024-04-17 RX ADMIN — POTASSIUM CHLORIDE 10 MEQ: 7.46 INJECTION, SOLUTION INTRAVENOUS at 14:02

## 2024-04-17 RX ADMIN — METRONIDAZOLE 500 MG: 500 INJECTION, SOLUTION INTRAVENOUS at 20:45

## 2024-04-17 RX ADMIN — DEXTROSE AND SODIUM CHLORIDE 75 ML/HR: 5; 450 INJECTION, SOLUTION INTRAVENOUS at 10:09

## 2024-04-17 RX ADMIN — VANCOMYCIN HYDROCHLORIDE 125 MG: 125 CAPSULE ORAL at 20:43

## 2024-04-17 RX ADMIN — ONDANSETRON 4 MG: 2 INJECTION INTRAMUSCULAR; INTRAVENOUS at 16:03

## 2024-04-17 RX ADMIN — AMLODIPINE BESYLATE 10 MG: 10 TABLET ORAL at 17:47

## 2024-04-17 RX ADMIN — POTASSIUM CHLORIDE 10 MEQ: 7.46 INJECTION, SOLUTION INTRAVENOUS at 12:32

## 2024-04-17 RX ADMIN — Medication 1 CAPSULE: at 17:47

## 2024-04-17 RX ADMIN — ATORVASTATIN CALCIUM 20 MG: 20 TABLET, FILM COATED ORAL at 17:47

## 2024-04-17 RX ADMIN — ALOGLIPTIN 25 MG: 25 TABLET, FILM COATED ORAL at 20:43

## 2024-04-17 RX ADMIN — METOCLOPRAMIDE 10 MG: 5 INJECTION, SOLUTION INTRAMUSCULAR; INTRAVENOUS at 16:03

## 2024-04-17 ASSESSMENT — PAIN - FUNCTIONAL ASSESSMENT: PAIN_FUNCTIONAL_ASSESSMENT: 0-10

## 2024-04-17 NOTE — PROGRESS NOTES
Gastroenterology Progress Note    Shelley Jordan is a 53 y.o. female patient.  Hospitalization Day:3    I am seeing the patient today for colitis. Interval finding includes a positive C-diff result as of 2024.    Physical    VITALS:  BP (!) 158/76   Pulse 76   Temp 98.3 °F (36.8 °C) (Temporal)   Resp 16   Ht 1.575 m (5' 2.01\")   Wt 86.7 kg (191 lb 1.6 oz)   LMP 2021   SpO2 98%   BMI 34.94 kg/m²   TEMPERATURE:  Current - Temp: 98.3 °F (36.8 °C); Max - Temp  Av.4 °F (36.9 °C)  Min: 97.6 °F (36.4 °C)  Max: 99.1 °F (37.3 °C)    General:  Alert and oriented,  No apparent distress  Skin- without jaundice  Eyes: anicteric sclera  Cardiac: RRR, Nl s1s2, without murmurs  Lungs CTA Bilaterally, normal effort  Abdomen soft, ND, NT, no HSM, Bowel sounds normal  Ext: without edema  Neuro: no asterixis     Data    Data Review:    Recent Labs     04/15/24  0628 24  0620 24  0548   WBC 11.5* 12.1* 12.9*   HGB 11.1* 11.8* 12.3   HCT 34.2* 35.6* 37.6   MCV 87.5 87.3 87.0    294 338     Recent Labs     04/15/24  0628 24  0620 24  0548    138 136   K 4.1 3.8 3.4*    103 98   CO2 22 24 23   BUN 7 5* 5*   CREATININE 0.6 0.5 0.7     Recent Labs     04/15/24  0628 24  0620 24  0548   AST 11 14 66*   ALT 16 16 38*   BILITOT 0.7 0.6 0.7   ALKPHOS 92 94 99     No results for input(s): \"LIPASE\", \"AMYLASE\" in the last 72 hours.  No results for input(s): \"PROTIME\", \"INR\" in the last 72 hours.  No results for input(s): \"PTT\" in the last 72 hours.      2024     Component  Resulting Agency   Specimen Description .FECES  .FECES [facility]   C Difficile tox, pcr POSITIVE: C difficile tcdB nucleic acid detected by RT-PCR. Abnormal            ASSESSMENT:  Principal Problem:    Colitis  Active Problems:    Uncontrolled type 2 diabetes mellitus with hyperglycemia (HCC)    Dyslipidemia    Adenomyosis    Hypertension    Ischemic colitis (HCC)    Class 2  obesity  Resolved Problems:    * No resolved hospital problems. *    Shelley Jordan is a 53-year-old woman with a history of diabetes mellitus type 2, ischemic colitis who presented to the ED on 4/14/2024 with a complaint of left-sided abdominal pain as well as diarrhea.  Her C. difficile testing resulted today 4/17/2024 and is positive.  It is possible that she likely has C. difficile colitis and that the imaging finding from the repeat CT of the abdomen was likely from her prior episode of ischemic colitis on 1/31/2024.  At this time there is no clear indication to move forward with a colonoscopy as the ideal would be for her C. difficile to be treated.   Discussed with Dr. Messina regarding the plan which is to defer her colonoscopy at this time.    It is not unreasonable to consider completing a 14 or 30-day cardiac event monitor based on cardiology assessment just to ensure that there is no underlying arrhythmia.  At this time she is clinically improved and stable      PLAN :  Defer colonoscopy  Okay to advance diet today as the plan has been discussed with Dr. Messina.     Thank you for allowing me to participate in the care of your patient.  Please feel free to contact me with any concerns. [phone]    JORDIN PLASCENCIA MD    Note is dictated utilizing voice recognition software. Unfortunately this leads to occasional typographical errors. Please contact our office if you have any questions.

## 2024-04-17 NOTE — INTERVAL H&P NOTE
Update History & Physical    The patient's History and Physical of April 17, 2024 was reviewed with the patient and I examined the patient. There was no change. The surgical site was confirmed by the patient and me.     Plan: The risks, benefits, expected outcome, and alternative to the recommended procedure have been discussed with the patient. Patient understands and wants to proceed with the procedure.     Electronically signed by JORDIN PLASCENCIA MD on 4/17/2024 at 3:04 PM

## 2024-04-17 NOTE — H&P (VIEW-ONLY)
Gastroenterology Consult Note    Patient:   Shelley Jordan   Admit date:  4/14/2024  Facility:   [facility]  Referring/PCP: Raul Messina MD  Date:     4/15/2024  Consultant:   JORDIN PLASCENCIA MD, MD    Subjective:     Shelley Jordan is a 53 y.o. female was admitted 4/14/2024 with a diagnosis of \"Colitis [K52.9]\" and is seen in consultation regarding   Chief Complaint   Patient presents with    Abdominal Pain     LLQ- started at 0400- last BM today, hx of n/v x1   She reports that the abdominal discomfort started on Saturday, 4/13/2024 when she noticed sudden onset persistent abdominal pain in her left upper and left lower quadrant.  She states that she did have some bowel movements which were loose but no rectal bleeding.  She reports that it reminded her of her prior episode which led to her presenting to the emergency department in January 2024 and so she decided she probably should present to the hospital.  A CT scan performed on 4/14/2024 showed left-sided colitis in a similar pattern as her prior CT scan from January 31, 2024.  A CT angiogram at the time of her prior admission showed no evidence of blood vessel compromise.  A colonoscopy performed on 2/1/2024 was consistent with ischemic colitis localized to the splenic flexure.     CT of abdomen 1/31/2024  Acute colitis can be seen involving the left colon from the splenic flexure  down to the sigmoid. There is colonic wall inflammation and thickening as  well as pericolonic mesenteric inflammation. This likely accounts for rectal  bleeding. No bowel obstruction or perforation.    CT of abdomen 4/14/2024  No evidence of obstructive uropathy.     CT Angiogram: 2/02/24  Essentially unremarkable CT angiogram of the abdomen and pelvis.  Mild  scattered plaque formation of the abdominal aorta without significant  stenoses or mesenteric occlusive disease.   Again shown are inflammatory changes/colitis involving the descending colon. Stable mild diffuse

## 2024-04-17 NOTE — PROGRESS NOTES
Patient educated on time of surgery and estimate of when will be picked up. Patient aware of need to remove all jewlry and extra clothing prior to going down for surgery. Towels/wash rags and surgical soap placed in bathroom. Patient is independent in room. Writer explained to patient to go into bathroom late morning and wash self up well with surgical soap. Patient acknowledged.     Surgical extension tubing applied. Paper surgical checklist completed. EPIC surgery checklist completed.

## 2024-04-17 NOTE — PROGRESS NOTES
Vitals and assessment done at this time. See flow sheet for more details. Pt resting in chair at this time. Pt stated her stool is yellow see through at this time. Pt denied any N/V, SOB, numbness/tingling in hands or feet, and light headedness/dizziness. Pt denied any further needs at this time. Call light within reach, will continue to monitor.

## 2024-04-17 NOTE — PROGRESS NOTES
[[id number]] Collected: 04/14/24 1456    Order Status: No result Updated: 04/16/24 1620    Culture, Urine [[id number]]     Order Status: No result Specimen: Urine, clean catch               Imaging Data:   CT ABDOMEN PELVIS WO CONTRAST Additional Contrast? None  Result Date: 4/14/2024  FINDINGS:   LOWER CHEST: Visualized portion of the lower chest demonstrates no acute abnormality.   KIDNEYS AND URINARY TRACT: No renal calculi are identified. There is no evidence for hydronephrosis.  The ureters are of normal course and caliber.   ORGANS: Visualized portions of the unenhanced liver, spleen, pancreas, and adrenal glands demonstrate no acute abnormality. No inflammatory changes in the gallbladder fossa.   GI/BOWEL: No free air is appreciated. There is mild diffuse inflammation involving the descending colon compatible with colitis.  No significant change is identified since 01/31/2024.  No significant perforation is appreciated.  Minimal diverticulosis is noted without convincing evidence of acute diverticulitis.   PELVIS: The bladder and pelvic organs are unremarkable.   PERITONEUM/RETROPERITONEUM: No lymphadenopathy is noted.   BONES/SOFT TISSUES: The osseous structures demonstrate no acute abnormality. There is a probable benign bone island within the left iliac bone.     No evidence of obstructive uropathy. Stable mild diffuse inflammation involving the descending colon compatible with colitis which may represent inflammatory bowel disease such as ulcerative colitis.       CTA ABDOMEN PELVIS W CONTRAST  Result Date: 4/15/2024  Similar essentially unremarkable CT angiogram of the abdomen and pelvis. Inflammatory changes/colitis involving the descending and sigmoid colon.      ECHOCARDIOGRAM  Result Date: 4/15/2024  Left Ventricle: Normal left ventricular systolic function with a visually estimated EF of 60 - 65%.   Left ventricle size is normal. Normal wall thickness. Normal wall motion.   Normal diastolic

## 2024-04-17 NOTE — CONSULTS
Gastroenterology Consult Note    Patient:   Shelley Jordan   Admit date:  4/14/2024  Facility:   [facility]  Referring/PCP: Raul Messina MD  Date:     4/15/2024  Consultant:   JORDIN PLASCENCIA MD, MD    Subjective:     Shelley Jordan is a 53 y.o. female was admitted 4/14/2024 with a diagnosis of \"Colitis [K52.9]\" and is seen in consultation regarding   Chief Complaint   Patient presents with    Abdominal Pain     LLQ- started at 0400- last BM today, hx of n/v x1   She reports that the abdominal discomfort started on Saturday, 4/13/2024 when she noticed sudden onset persistent abdominal pain in her left upper and left lower quadrant.  She states that she did have some bowel movements which were loose but no rectal bleeding.  She reports that it reminded her of her prior episode which led to her presenting to the emergency department in January 2024 and so she decided she probably should present to the hospital.  A CT scan performed on 4/14/2024 showed left-sided colitis in a similar pattern as her prior CT scan from January 31, 2024.  A CT angiogram at the time of her prior admission showed no evidence of blood vessel compromise.  A colonoscopy performed on 2/1/2024 was consistent with ischemic colitis localized to the splenic flexure.     CT of abdomen 1/31/2024  Acute colitis can be seen involving the left colon from the splenic flexure  down to the sigmoid. There is colonic wall inflammation and thickening as  well as pericolonic mesenteric inflammation. This likely accounts for rectal  bleeding. No bowel obstruction or perforation.    CT of abdomen 4/14/2024  No evidence of obstructive uropathy.     CT Angiogram: 2/02/24  Essentially unremarkable CT angiogram of the abdomen and pelvis.  Mild  scattered plaque formation of the abdominal aorta without significant  stenoses or mesenteric occlusive disease.   Again shown are inflammatory changes/colitis involving the descending colon. Stable mild diffuse

## 2024-04-17 NOTE — PROGRESS NOTES
Pt on Trulicity, last does 4/12/24. Nathalie CRNA made aware and scanned pt abdomen. Gastric contents noted on US and Nathalie decided to cancel procedure today. Pt will be rescheduled for tomorrow.

## 2024-04-18 ENCOUNTER — APPOINTMENT (OUTPATIENT)
Age: 54
DRG: 373 | End: 2024-04-18
Payer: COMMERCIAL

## 2024-04-18 VITALS
OXYGEN SATURATION: 96 % | HEART RATE: 78 BPM | HEIGHT: 62 IN | RESPIRATION RATE: 16 BRPM | TEMPERATURE: 98.8 F | DIASTOLIC BLOOD PRESSURE: 70 MMHG | BODY MASS INDEX: 35.11 KG/M2 | WEIGHT: 190.8 LBS | SYSTOLIC BLOOD PRESSURE: 123 MMHG

## 2024-04-18 LAB
ALBUMIN SERPL-MCNC: 3.6 G/DL (ref 3.5–5.2)
ALBUMIN/GLOB SERPL: 1.1 {RATIO} (ref 1–2.5)
ALP SERPL-CCNC: 87 U/L (ref 35–104)
ALT SERPL-CCNC: 47 U/L (ref 5–33)
ANION GAP SERPL CALCULATED.3IONS-SCNC: 11 MMOL/L (ref 9–17)
AST SERPL-CCNC: 71 U/L
BASOPHILS # BLD: 0.08 K/UL (ref 0–0.2)
BASOPHILS NFR BLD: 1 % (ref 0–2)
BILIRUB SERPL-MCNC: 0.7 MG/DL (ref 0.3–1.2)
BUN SERPL-MCNC: 5 MG/DL (ref 6–20)
BUN/CREAT SERPL: 8 (ref 9–20)
CALCIUM SERPL-MCNC: 8.7 MG/DL (ref 8.6–10.4)
CHLORIDE SERPL-SCNC: 105 MMOL/L (ref 98–107)
CO2 SERPL-SCNC: 25 MMOL/L (ref 20–31)
CREAT SERPL-MCNC: 0.6 MG/DL (ref 0.5–0.9)
CRP SERPL HS-MCNC: 9.7 MG/L (ref 0–5)
ECHO BSA: 1.95 M2
EOSINOPHIL # BLD: 0.31 K/UL (ref 0–0.44)
EOSINOPHILS RELATIVE PERCENT: 3 % (ref 1–4)
ERYTHROCYTE [DISTWIDTH] IN BLOOD BY AUTOMATED COUNT: 13.5 % (ref 11.8–14.4)
ERYTHROCYTE [SEDIMENTATION RATE] IN BLOOD BY PHOTOMETRIC METHOD: 38 MM/HR (ref 0–30)
GFR SERPL CREATININE-BSD FRML MDRD: >90 ML/MIN/1.73M2
GLUCOSE SERPL-MCNC: 150 MG/DL (ref 70–99)
HCT VFR BLD AUTO: 34.9 % (ref 36.3–47.1)
HGB BLD-MCNC: 11.3 G/DL (ref 11.9–15.1)
IMM GRANULOCYTES # BLD AUTO: 0.04 K/UL (ref 0–0.3)
IMM GRANULOCYTES NFR BLD: 0 %
LYMPHOCYTES NFR BLD: 2.13 K/UL (ref 1.1–3.7)
LYMPHOCYTES RELATIVE PERCENT: 23 % (ref 24–43)
MCH RBC QN AUTO: 27.9 PG (ref 25.2–33.5)
MCHC RBC AUTO-ENTMCNC: 32.4 G/DL (ref 28.4–34.8)
MCV RBC AUTO: 86.2 FL (ref 82.6–102.9)
MONOCYTES NFR BLD: 0.81 K/UL (ref 0.1–1.2)
MONOCYTES NFR BLD: 9 % (ref 3–12)
NEUTROPHILS NFR BLD: 64 % (ref 36–65)
NEUTS SEG NFR BLD: 5.84 K/UL (ref 1.5–8.1)
NRBC BLD-RTO: 0 PER 100 WBC
PLATELET # BLD AUTO: 294 K/UL (ref 138–453)
PMV BLD AUTO: 9.5 FL (ref 8.1–13.5)
POTASSIUM SERPL-SCNC: 3.7 MMOL/L (ref 3.7–5.3)
PROT SERPL-MCNC: 6.9 G/DL (ref 6.4–8.3)
RBC # BLD AUTO: 4.05 M/UL (ref 3.95–5.11)
SODIUM SERPL-SCNC: 141 MMOL/L (ref 135–144)
WBC OTHER # BLD: 9.2 K/UL (ref 3.5–11.3)

## 2024-04-18 PROCEDURE — 85025 COMPLETE CBC W/AUTO DIFF WBC: CPT

## 2024-04-18 PROCEDURE — 80053 COMPREHEN METABOLIC PANEL: CPT

## 2024-04-18 PROCEDURE — 86140 C-REACTIVE PROTEIN: CPT

## 2024-04-18 PROCEDURE — 94761 N-INVAS EAR/PLS OXIMETRY MLT: CPT

## 2024-04-18 PROCEDURE — 6370000000 HC RX 637 (ALT 250 FOR IP): Performed by: INTERNAL MEDICINE

## 2024-04-18 PROCEDURE — 36415 COLL VENOUS BLD VENIPUNCTURE: CPT

## 2024-04-18 PROCEDURE — 85652 RBC SED RATE AUTOMATED: CPT

## 2024-04-18 PROCEDURE — 93270 REMOTE 30 DAY ECG REV/REPORT: CPT

## 2024-04-18 PROCEDURE — 6360000002 HC RX W HCPCS: Performed by: INTERNAL MEDICINE

## 2024-04-18 RX ORDER — VANCOMYCIN HYDROCHLORIDE 125 MG/1
125 CAPSULE ORAL 4 TIMES DAILY
Qty: 52 CAPSULE | Refills: 0 | Status: SHIPPED | OUTPATIENT
Start: 2024-04-18 | End: 2024-05-01

## 2024-04-18 RX ADMIN — METRONIDAZOLE 500 MG: 500 INJECTION, SOLUTION INTRAVENOUS at 04:39

## 2024-04-18 RX ADMIN — ATORVASTATIN CALCIUM 20 MG: 20 TABLET, FILM COATED ORAL at 08:57

## 2024-04-18 RX ADMIN — VANCOMYCIN HYDROCHLORIDE 125 MG: 125 CAPSULE ORAL at 08:57

## 2024-04-18 RX ADMIN — CIPROFLOXACIN 400 MG: 400 INJECTION, SOLUTION INTRAVENOUS at 03:42

## 2024-04-18 RX ADMIN — AMLODIPINE BESYLATE 10 MG: 10 TABLET ORAL at 08:57

## 2024-04-18 RX ADMIN — Medication 1 CAPSULE: at 08:57

## 2024-04-18 NOTE — PLAN OF CARE
Problem: Discharge Planning  Goal: Discharge to home or other facility with appropriate resources  Flowsheets  Taken 4/17/2024 1830 by Louise Prince RN  Discharge to home or other facility with appropriate resources:   Identify barriers to discharge with patient and caregiver   Arrange for needed discharge resources and transportation as appropriate   Identify discharge learning needs (meds, wound care, etc)   Refer to discharge planning if patient needs post-hospital services based on physician order or complex needs related to functional status, cognitive ability or social support system  Taken 4/17/2024 0702 by Donta Wilson RN  Discharge to home or other facility with appropriate resources: Identify barriers to discharge with patient and caregiver     Problem: Safety - Adult  Goal: Free from fall injury  Flowsheets  Taken 4/17/2024 2057 by Louise Prince RN  Free From Fall Injury: Instruct family/caregiver on patient safety  Taken 4/17/2024 1143 by Donta Wilson RN  Free From Fall Injury: Instruct family/caregiver on patient safety     Problem: Nutrition Deficit:  Goal: Optimize nutritional status  Flowsheets (Taken 4/17/2024 2057)  Nutrient intake appropriate for improving, restoring, or maintaining nutritional needs: Monitor oral intake, labs, and treatment plans     Problem: Chronic Conditions and Co-morbidities  Goal: Patient's chronic conditions and co-morbidity symptoms are monitored and maintained or improved  Flowsheets  Taken 4/17/2024 1830 by Louise Prince RN  Care Plan - Patient's Chronic Conditions and Co-Morbidity Symptoms are Monitored and Maintained or Improved:   Monitor and assess patient's chronic conditions and comorbid symptoms for stability, deterioration, or improvement   Collaborate with multidisciplinary team to address chronic and comorbid conditions and prevent exacerbation or deterioration   Update acute care plan with appropriate goals if chronic or comorbid

## 2024-04-18 NOTE — PROGRESS NOTES
Physician Progress Note      PATIENT:               BRIAN URIAS  CSN #:                  [id number]  :                       1970  ADMIT DATE:       2024 10:36 AM  DISCH DATE:  RESPONDING  PROVIDER #:        Raul Messina MD          QUERY TEXT:    Pt admitted with colitis.  C difficile toxin molecular subsequently returned   positive.  Initial WBC 19.9 with 16.65 neutrophils absolute.  Initial lactic acid 2.4    If possible, please document in the progress notes and discharge summary if   you are evaluating and /or treating any of the following:    The medical record reflects the following:  Risk Factors: Clostridium difficile colitis  Clinical Indicators: abdominal pain and tenderness; Initial WBC 19.9 with   16.65 neutrophils absolute.  Initial lactic acid 2.4, initial heart rate 92  Treatment: IV NS bolus 1000 ml in ED,  IIV D5/0.45 Nacl infusion,  IV Cipro,   IV Flagyl, IV vancomycin    Margaret Lombardi, MSN, RN, CCDS, CRCR  Clinical   .  Options provided:  -- Sepsis, present on admission, due to C difficile colitis  -- C difficile colitis without Sepsis  -- Other - I will add my own diagnosis  -- Disagree - Not applicable / Not valid  -- Disagree - Clinically unable to determine / Unknown  -- Refer to Clinical Documentation Reviewer    PROVIDER RESPONSE TEXT:    This patient has C difficile colitis without Sepsis.    Query created by: Margaret Lombardi on 2024 10:56 AM      Electronically signed by:  Raul Messina MD 2024 11:51 AM

## 2024-04-18 NOTE — PROGRESS NOTES
Discharge summary reviewed with patient at this time. Answered any questions or concerns at this time. Copy provided to patient as well.

## 2024-04-18 NOTE — PROGRESS NOTES
Pt is A/O x 4, calm and cooperative. Assessment and vital signs completed, see flow sheet. Pt resting in bed and call light within reach. Pt denies pain or nausea at this time.  Denies further needs and will continue to monitor.

## 2024-04-18 NOTE — DISCHARGE SUMMARY
Raul Messina M.D.  Internal Medicine Discharge Summary    Patient ID:  Shelley Jordan  [id number]  1970    Admission date: 4/14/2024    Discharge date: 4/18/2024     Admitting Physician: Raul Messina MD     Primary Care Physician: Raul Messina MD     Primary Discharge Diagnoses:   Principal Problem:    C. difficile colitis  Active Problems:    Uncontrolled type 2 diabetes mellitus with hyperglycemia (HCC)    Hypertension    Class 2 obesity  Resolved Problems:    Colitis    Ischemic colitis (HCC)       Additional Diagnoses:       Diagnosis Date    Colitis     Diabetes mellitus (HCC)     [place] (hard of hearing)     PHI HEARING AIDS    Hyperlipidemia     Hypertension     PONV (postoperative nausea and vomiting)         Hospital Course:   Shelley Jordan is a 53 y.o. female who was admitted for C. difficile colitis.    Ms. Jordan  presented to the ER with LLQ abdominal pain radiating to the left flank.   She has h/o colitis in February and underwent colonoscopy at that time  on 2/1/24 showing linear semi-circumferential erythema from 80 cm to 30 cm from the anorectal verge suggesting ischemic colitis.   In ER this episode, her CT showed descending colitis and she was admitted for further workup.  She was started empirically on IV Cipro and Flagyl and her symptoms and leukocytosis improved.  Her abdominal pain had pretty much resolved and she was starting to feel hungry again.  Her C diff test subsequently came back positive on 4/17 and she was started on PO Vancomycin.  This morning she is tolerating a regular diet and denies any abdominal pain.  She was deemed medically stable for discharge and was amenable to the discharge plan.  Due to her previous h/o ischemic colitis 2 months ago a CAM monitor was placed prior to DC to monitor for any arrhythmias.  She will wear this for 1 month and f/u with Dr. Lemon in Cardiology clinic in 1 month's time.     Discharge Exam:  GEN:    Awake, alert and oriented  the acute symptoms resolve to rule out an infiltrative process.  Mild diverticulosis of the colon. Previous hysterectomy.  Small amount of free fluid in the pelvis.  Previous  section.  There are few scattered lymph nodes without bulky adenopathy identified.   CTA ABDOMEN: Very mild calcific plaque of the abdominal aorta without evidence of abdominal aortic aneurysm or significant stenosis.  Celiac trunk and SMA are essentially patent with similar slight narrowing of the celiac trunk.  The TITUS is filled.  The renal arteries are patent including an accessory artery on the left.   CTA PELVIS: Caudal abdominal aorta, iliac, and visualized femoral arteries are patent without significant stenosis.     IMPRESSION:  Similar essentially unremarkable CT angiogram of the abdomen and pelvis.   Inflammatory changes/colitis involving the descending and sigmoid colon.       Echo (TTE) complete   Result Date: 4/15/2024    Left Ventricle: Normal left ventricular systolic function with a visually estimated EF of 60 - 65%. Left ventricle size is normal. Normal wall thickness. Normal wall motion. Normal diastolic function.     Aortic Valve: Mild regurgitation.     Tricuspid Valve: Mild regurgitation. The estimated RVSP is 18 mmHg. No prior studies were available for comparison.       CT ABDOMEN PELVIS WO CONTRAST Additional Contrast? None  Result Date: 2024  FINDINGS:   LOWER CHEST: Visualized portion of the lower chest demonstrates no acute abnormality.   KIDNEYS AND URINARY TRACT: No renal calculi are identified. There is no evidence for hydronephrosis.  The ureters are of normal course and caliber.   ORGANS: Visualized portions of the unenhanced liver, spleen, pancreas, and adrenal glands demonstrate no acute abnormality. No inflammatory changes in the gallbladder fossa.   GI/BOWEL: No free air is appreciated. There is mild diffuse inflammation involving the descending colon compatible with colitis.  No significant

## 2024-04-18 NOTE — PROGRESS NOTES
[facility]                       Inpatient Medication Education Note                                 Patient admitted for C diff    Medications reviewed with the patient include:  Cipro, Flagyl, Vancomycin po, and Culturelle.       Patient education provided when necessary to include potential medication related side effects with acknowledgement of understanding.      Anne-Marie Pinon [facility], 4/18/2024, 10:51 AM

## 2024-04-18 NOTE — PROGRESS NOTES
Raul Messina M.D.  Internal Medicine Progress Note    Patient: Shelley Jordan  Date of Admission: 4/14/2024 10:36 AM  Date of Evaluation: 4/18/2024      SUBJECTIVE:    Shelley Jordan is a 53 y.o. female who was seen today along with Case Management for follow up of C. difficile colitis.  She is feeling better.  Abd pain has resolved.  She is tolerating regular diet and requesting to be DCd home.      ROS:   Constitutional: negative  for fevers, and negative for chills.  Respiratory: negative for shortness of breath, negative for cough, and negative for wheezing  Cardiovascular: negative for chest pain, and negative for palpitations  Gastrointestinal: positive for abdominal pain, positive for nausea,positive for vomiting, negative for diarrhea, and negative for constipation     All other systems were reviewed with the patient and are negative unless otherwise stated in HPI    -----------------------------------------------------------------  OBJECTIVE:  Vitals:   Temp: 98.8 °F (37.1 °C)  BP: 123/70  Respirations: 16  Pulse: 78  SpO2: 96 % on room air    Weight  Wt Readings from Last 3 Encounters:   04/18/24 86.5 kg (190 lb 12.8 oz)   02/26/24 87.5 kg (193 lb)   02/09/24 89.9 kg (198 lb 3.2 oz)     Body mass index is 34.89 kg/m².    24HR INTAKE/OUTPUT:      Intake/Output Summary (Last 24 hours) at 4/18/2024 0858  Last data filed at 4/18/2024 0526  Gross per 24 hour   Intake --   Output 1600 ml   Net -1600 ml       Exam:  GEN:    Awake, alert and oriented x 3.   EYES:  EOMI, pupils equal   NECK: Supple. No lymphadenopathy.  No carotid bruit  CVS:    regular rate and rhythm, no audible murmur  PULM:  CTA, no wheezes, rales or rhonchi, no acute respiratory distress  ABD:    Bowels sounds normal.  Abdomen is soft.  No distention..  no tenderness to palpation.   EXT:   no edema bilaterally .  No calf tenderness.   NEURO: Moves all extremities.  Motor and sensory are grossly intact  SKIN:  No rashes.  No skin lesions.

## 2024-04-18 NOTE — PLAN OF CARE
Problem: Discharge Planning  Goal: Discharge to home or other facility with appropriate resources  4/18/2024 0938 by Vianey Benavides RN  Outcome: Completed  4/18/2024 0734 by Vianey Benavides RN  Outcome: Progressing  4/17/2024 2057 by Louise Prince RN  Flowsheets  Taken 4/17/2024 1830 by Louise Prince RN  Discharge to home or other facility with appropriate resources:   Identify barriers to discharge with patient and caregiver   Arrange for needed discharge resources and transportation as appropriate   Identify discharge learning needs (meds, wound care, etc)   Refer to discharge planning if patient needs post-hospital services based on physician order or complex needs related to functional status, cognitive ability or social support system  Taken 4/17/2024 0702 by Donta Wilson RN  Discharge to home or other facility with appropriate resources: Identify barriers to discharge with patient and caregiver     Problem: Safety - Adult  Goal: Free from fall injury  4/18/2024 0938 by Vianey Benavides RN  Outcome: Completed  4/18/2024 0734 by Vianey Benavides RN  Outcome: Progressing  4/17/2024 2057 by Louise Prince RN  Flowsheets  Taken 4/17/2024 2057 by Louise Prince RN  Free From Fall Injury: Instruct family/caregiver on patient safety  Taken 4/17/2024 1143 by Donta Wilson RN  Free From Fall Injury: Instruct family/caregiver on patient safety     Problem: Potential for Alteration in Skin Integrity  Goal: Monitor skin for areas of alteration in skin integrity  Description: Patient [unfilled] will remain free from alterations of or worsening skin integrity as evidenced by no changes to skin during assessment each shift during the inpatient hospice stay.  4/18/2024 0938 by Vianey Benavides RN  Outcome: Completed  4/18/2024 0734 by Vianey Benavides RN  Outcome: Progressing     Problem: Risk for Falls  Goal: Fall prevention  Description: Patient  will remain free from falls as evidenced by no witnessed  or reported falls each shift during the inpatient hospice stay.     Patient  and or family/caregiver will receive education on fall prevention as evidenced by verbalizing recall of using the call lights system, fall prevention devices, and asking for help during the admission process and ongoing as needed during the inpatient hospice stay.    4/18/2024 0938 by Vainey Benavides RN  Outcome: Completed  4/18/2024 0734 by Vianey Benavides RN  Outcome: Progressing     Problem: Nausea/Vomiting (Adult)  Goal: Control of nausea/vomiting  Description: Patient  will exhibit decreased or eliminated episodes of nausea/vomiting as evidenced by less than 2 PRN antiemetic medication administrations each shift during the inpatient hospice stay.    4/18/2024 0938 by Vianey Benavides RN  Outcome: Completed  4/18/2024 0734 by Vianey Benavides RN  Outcome: Progressing     Problem: Nutrition Deficit:  Goal: Optimize nutritional status  4/18/2024 0938 by Vianey Benavides RN  Outcome: Completed  4/17/2024 2057 by Louise Prince RN  Flowsheets (Taken 4/17/2024 2057)  Nutrient intake appropriate for improving, restoring, or maintaining nutritional needs: Monitor oral intake, labs, and treatment plans     Problem: Chronic Conditions and Co-morbidities  Goal: Patient's chronic conditions and co-morbidity symptoms are monitored and maintained or improved  4/18/2024 0938 by Vianey Benavides RN  Outcome: Completed  4/17/2024 2057 by Louise Prince RN  Flowsheets  Taken 4/17/2024 1830 by Louise Prince RN  Care Plan - Patient's Chronic Conditions and Co-Morbidity Symptoms are Monitored and Maintained or Improved:   Monitor and assess patient's chronic conditions and comorbid symptoms for stability, deterioration, or improvement   Collaborate with multidisciplinary team to address chronic and comorbid conditions and prevent exacerbation or deterioration   Update acute care plan with appropriate goals if chronic or comorbid symptoms are

## 2024-04-18 NOTE — PLAN OF CARE
Problem: Discharge Planning  Goal: Discharge to home or other facility with appropriate resources  4/18/2024 0734 by Vianey Benavides, RN  Outcome: Progressing     Problem: Safety - Adult  Goal: Free from fall injury  4/18/2024 0734 by Vianey Benavides, RN  Outcome: Progressing     Problem: Potential for Alteration in Skin Integrity  Goal: Monitor skin for areas of alteration in skin integrity  Description: Patient [unfilled] will remain free from alterations of or worsening skin integrity as evidenced by no changes to skin during assessment each shift during the inpatient hospice stay.  Outcome: Progressing     Problem: Risk for Falls  Goal: Fall prevention  Description: Patient  will remain free from falls as evidenced by no witnessed or reported falls each shift during the inpatient hospice stay.     Patient  and or family/caregiver will receive education on fall prevention as evidenced by verbalizing recall of using the call lights system, fall prevention devices, and asking for help during the admission process and ongoing as needed during the inpatient hospice stay.    Outcome: Progressing     Problem: Nausea/Vomiting (Adult)  Goal: Control of nausea/vomiting  Description: Patient  will exhibit decreased or eliminated episodes of nausea/vomiting as evidenced by less than 2 PRN antiemetic medication administrations each shift during the inpatient hospice stay.    Outcome: Progressing

## 2024-04-18 NOTE — PROGRESS NOTES
Vitals and assessment were completed at this time. Patient denies abdominal pain and has not had any bms in the past few hours.   Writer walked patient through the medications that would be administered tonight and encouraged patient to ask questions about the medications and therapies. Patient is knows to call out if she needs pain medications.   Patient denies questions. Call light and bedside table remain within reach. Patient denies needs at this time. Care ongoing.

## 2024-04-19 LAB — CALPROTECTIN, FECAL: 1880 UG/G

## 2024-05-13 ENCOUNTER — OFFICE VISIT (OUTPATIENT)
Dept: UROLOGY | Age: 54
End: 2024-05-13
Payer: COMMERCIAL

## 2024-05-13 VITALS
BODY MASS INDEX: 34.71 KG/M2 | RESPIRATION RATE: 18 BRPM | HEART RATE: 93 BPM | DIASTOLIC BLOOD PRESSURE: 86 MMHG | WEIGHT: 190.4 LBS | OXYGEN SATURATION: 98 % | SYSTOLIC BLOOD PRESSURE: 128 MMHG

## 2024-05-13 DIAGNOSIS — N39.0 FREQUENT UTI: Primary | ICD-10-CM

## 2024-05-13 DIAGNOSIS — N95.8 GENITOURINARY SYNDROME OF MENOPAUSE: ICD-10-CM

## 2024-05-13 PROCEDURE — 3079F DIAST BP 80-89 MM HG: CPT | Performed by: NURSE PRACTITIONER

## 2024-05-13 PROCEDURE — 3074F SYST BP LT 130 MM HG: CPT | Performed by: NURSE PRACTITIONER

## 2024-05-13 PROCEDURE — 99213 OFFICE O/P EST LOW 20 MIN: CPT | Performed by: NURSE PRACTITIONER

## 2024-05-13 RX ORDER — ESTRADIOL 0.1 MG/G
CREAM VAGINAL
Qty: 42.5 G | Refills: 3 | Status: SHIPPED | OUTPATIENT
Start: 2024-05-13

## 2024-05-13 NOTE — PROGRESS NOTES
History  4/2021 Referral from Dr. Messina for gross hematuria.  She has been having intermittent gross hematuria since her ablation in 12/2020.  This has not been associated with flank or abdominal pain.  She was never a smoker.  She works at [employer].  She denies any history of stones.  She does have baseline frequency every 1-2 hours, urge incontinence, and nocturia 2-3 times per night.  PVR 96 mL.  This is secondary to diabetes.  Hgb A1c from 10/2020 was 6.7.  She has never seen urology in the past.                 CT urogram was negative for  abnormalities.  Cystoscopy and pelvic showed normal anatomy     Frequent UTI  7/2021 - group beta strep, e.coli  6/2021 - group beta strep  4/2021 - group beta strep  11/2020 - e.coli     8/2021 -started on Macrobid 50 mg for UTI prophylaxis     1/2022 [facility]. 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  Adenomyosis.  Serosal adhesions.  Cervix without specific histologic abnormality.  Bilateral and fallopian tubes no specific histologic abnormality.    11/2023 stopped prophylactic antibiotic    Today  Here today to follow-up for frequent urinary tract infections.  She has been using topical estradiol.  We did stop her prophylactic antibiotic 6 months ago.  We have not had any issues with urinary tract infections since 2021.  She is using estrogen 3 nights per week.  She denies any lower urinary tract symptoms.    Plan  Continue estradiol Insert one inch inside the vagina and place an additional pea-sized to entire urethra, inner labia and clitoris three nights per week     Call our office for any signs of UTI    Follow-up in 1 year or sooner if needed

## 2024-05-17 ENCOUNTER — OFFICE VISIT (OUTPATIENT)
Dept: CARDIOLOGY | Age: 54
End: 2024-05-17
Payer: COMMERCIAL

## 2024-05-17 VITALS
DIASTOLIC BLOOD PRESSURE: 72 MMHG | HEIGHT: 62 IN | WEIGHT: 191 LBS | SYSTOLIC BLOOD PRESSURE: 120 MMHG | OXYGEN SATURATION: 98 % | BODY MASS INDEX: 35.15 KG/M2 | RESPIRATION RATE: 18 BRPM | HEART RATE: 84 BPM

## 2024-05-17 DIAGNOSIS — R00.0 SINUS TACHYCARDIA: Primary | ICD-10-CM

## 2024-05-17 DIAGNOSIS — E66.9 CLASS 1 OBESITY WITH BODY MASS INDEX (BMI) OF 34.0 TO 34.9 IN ADULT, UNSPECIFIED OBESITY TYPE, UNSPECIFIED WHETHER SERIOUS COMORBIDITY PRESENT: ICD-10-CM

## 2024-05-17 DIAGNOSIS — I10 ESSENTIAL HYPERTENSION: ICD-10-CM

## 2024-05-17 DIAGNOSIS — E78.2 MIXED HYPERLIPIDEMIA: ICD-10-CM

## 2024-05-17 DIAGNOSIS — Z09 HOSPITAL DISCHARGE FOLLOW-UP: ICD-10-CM

## 2024-05-17 LAB — ECHO BSA: 1.95 M2

## 2024-05-17 PROCEDURE — 3074F SYST BP LT 130 MM HG: CPT | Performed by: INTERNAL MEDICINE

## 2024-05-17 PROCEDURE — 99213 OFFICE O/P EST LOW 20 MIN: CPT | Performed by: INTERNAL MEDICINE

## 2024-05-17 PROCEDURE — 3078F DIAST BP <80 MM HG: CPT | Performed by: INTERNAL MEDICINE

## 2024-05-17 PROCEDURE — 1111F DSCHRG MED/CURRENT MED MERGE: CPT | Performed by: INTERNAL MEDICINE

## 2024-05-17 NOTE — PROGRESS NOTES
20 mg daily.      Obesity: Body mass index is 34.93 kg/m².   I also briefly discussed both diet and exercise strategies for her to continue to loses weight and she was very receptive to this.    FOLLOW UP:   I told Ms. Jordan to call my office if she had any problems, but otherwise told her to Return if symptoms worsen or fail to improve. However, I would be happy to see her sooner should the need arise.    Sincerely,  Nasra Lemon MD, F.A.C.C.  [facility]    [address] [place], [place] [address]  Phone: [phone], Fax: [phone]     I believe that the risk of significant morbidity and mortality related to the patient's current medical conditions are: low-intermediate. At least 25 minutes were spent during prep work, discussion and exam of the patient, and follow up documentation and all of their questions were answered.     The documentation recorded by the scribe, accurately and completely reflects the services I personally performed and the decisions made by me. Nasra Lemon MD, F.A.C.C. May 17, 2024

## 2024-06-01 ENCOUNTER — HOSPITAL ENCOUNTER (OUTPATIENT)
Age: 54
Discharge: HOME | End: 2024-06-01
Payer: COMMERCIAL

## 2024-06-01 DIAGNOSIS — E11.9 TYPE 2 DIABETES MELLITUS NOT AT GOAL (HCC): ICD-10-CM

## 2024-06-01 LAB
ANION GAP SERPL CALCULATED.3IONS-SCNC: 8 MMOL/L (ref 9–17)
BUN SERPL-MCNC: 14 MG/DL (ref 6–20)
BUN/CREAT SERPL: 23 (ref 9–20)
CALCIUM SERPL-MCNC: 9 MG/DL (ref 8.6–10.4)
CHLORIDE SERPL-SCNC: 100 MMOL/L (ref 98–107)
CO2 SERPL-SCNC: 27 MMOL/L (ref 20–31)
CREAT SERPL-MCNC: 0.6 MG/DL (ref 0.5–0.9)
GFR, ESTIMATED: >90 ML/MIN/1.73M2
GLUCOSE SERPL-MCNC: 189 MG/DL (ref 70–99)
POTASSIUM SERPL-SCNC: 4.3 MMOL/L (ref 3.7–5.3)
SODIUM SERPL-SCNC: 135 MMOL/L (ref 135–144)

## 2024-06-01 PROCEDURE — 83036 HEMOGLOBIN GLYCOSYLATED A1C: CPT

## 2024-06-01 PROCEDURE — 36415 COLL VENOUS BLD VENIPUNCTURE: CPT

## 2024-06-01 PROCEDURE — 80048 BASIC METABOLIC PNL TOTAL CA: CPT

## 2024-06-02 LAB
EST. AVERAGE GLUCOSE BLD GHB EST-MCNC: 163 MG/DL
HBA1C MFR BLD: 7.3 % (ref 4–6)

## 2024-08-17 ENCOUNTER — HOSPITAL ENCOUNTER (OUTPATIENT)
Age: 54
Discharge: HOME | End: 2024-08-17
Payer: COMMERCIAL

## 2024-08-17 DIAGNOSIS — E11.9 TYPE 2 DIABETES MELLITUS NOT AT GOAL (HCC): ICD-10-CM

## 2024-08-17 LAB
ALT SERPL-CCNC: 21 U/L (ref 5–33)
ANION GAP SERPL CALCULATED.3IONS-SCNC: 10 MMOL/L (ref 9–17)
AST SERPL-CCNC: 17 U/L
BUN SERPL-MCNC: 12 MG/DL (ref 6–20)
BUN/CREAT SERPL: 20 (ref 9–20)
CALCIUM SERPL-MCNC: 9.1 MG/DL (ref 8.6–10.4)
CHLORIDE SERPL-SCNC: 101 MMOL/L (ref 98–107)
CO2 SERPL-SCNC: 28 MMOL/L (ref 20–31)
CREAT SERPL-MCNC: 0.6 MG/DL (ref 0.5–0.9)
CREAT UR-MCNC: 114 MG/DL (ref 28–217)
ERYTHROCYTE [DISTWIDTH] IN BLOOD BY AUTOMATED COUNT: 13.3 % (ref 11.8–14.4)
GFR, ESTIMATED: >90 ML/MIN/1.73M2
GLUCOSE SERPL-MCNC: 164 MG/DL (ref 70–99)
HCT VFR BLD AUTO: 37.8 % (ref 36.3–47.1)
HGB BLD-MCNC: 12.3 G/DL (ref 11.9–15.1)
MCH RBC QN AUTO: 28.9 PG (ref 25.2–33.5)
MCHC RBC AUTO-ENTMCNC: 32.5 G/DL (ref 28.4–34.8)
MCV RBC AUTO: 88.9 FL (ref 82.6–102.9)
MICROALBUMIN UR-MCNC: <12 MG/L (ref 0–20)
MICROALBUMIN/CREAT UR-RTO: NORMAL MCG/MG CREAT (ref 0–25)
NRBC BLD-RTO: 0 PER 100 WBC
PLATELET # BLD AUTO: 300 K/UL (ref 138–453)
PMV BLD AUTO: 9.7 FL (ref 8.1–13.5)
POTASSIUM SERPL-SCNC: 3.8 MMOL/L (ref 3.7–5.3)
RBC # BLD AUTO: 4.25 M/UL (ref 3.95–5.11)
SODIUM SERPL-SCNC: 139 MMOL/L (ref 135–144)
WBC OTHER # BLD: 10.3 K/UL (ref 3.5–11.3)

## 2024-08-17 PROCEDURE — 84460 ALANINE AMINO (ALT) (SGPT): CPT

## 2024-08-17 PROCEDURE — 82570 ASSAY OF URINE CREATININE: CPT

## 2024-08-17 PROCEDURE — 80061 LIPID PANEL: CPT

## 2024-08-17 PROCEDURE — 82043 UR ALBUMIN QUANTITATIVE: CPT

## 2024-08-17 PROCEDURE — 80048 BASIC METABOLIC PNL TOTAL CA: CPT

## 2024-08-17 PROCEDURE — 36415 COLL VENOUS BLD VENIPUNCTURE: CPT

## 2024-08-17 PROCEDURE — 83036 HEMOGLOBIN GLYCOSYLATED A1C: CPT

## 2024-08-17 PROCEDURE — 84450 TRANSFERASE (AST) (SGOT): CPT

## 2024-08-17 PROCEDURE — 85027 COMPLETE CBC AUTOMATED: CPT

## 2024-08-18 LAB
CHOLEST SERPL-MCNC: 178 MG/DL (ref 0–199)
CHOLESTEROL/HDL RATIO: 4
EST. AVERAGE GLUCOSE BLD GHB EST-MCNC: 166 MG/DL
HBA1C MFR BLD: 7.4 % (ref 4–6)
HDLC SERPL-MCNC: 49 MG/DL
LDLC SERPL CALC-MCNC: 95 MG/DL (ref 0–100)
TRIGL SERPL-MCNC: 171 MG/DL
VLDLC SERPL CALC-MCNC: 34 MG/DL

## 2024-08-28 ENCOUNTER — HOSPITAL ENCOUNTER (OUTPATIENT)
Dept: WOMENS IMAGING | Age: 54
Discharge: HOME | End: 2024-08-30
Payer: COMMERCIAL

## 2024-08-28 DIAGNOSIS — Z12.31 SCREENING MAMMOGRAM FOR BREAST CANCER: ICD-10-CM

## 2024-08-28 PROCEDURE — 77063 BREAST TOMOSYNTHESIS BI: CPT

## 2024-09-17 ENCOUNTER — OFFICE VISIT (OUTPATIENT)
Dept: OBGYN | Age: 54
End: 2024-09-17
Payer: COMMERCIAL

## 2024-09-17 VITALS
BODY MASS INDEX: 33.66 KG/M2 | DIASTOLIC BLOOD PRESSURE: 74 MMHG | SYSTOLIC BLOOD PRESSURE: 116 MMHG | HEIGHT: 63 IN | WEIGHT: 190 LBS

## 2024-09-17 DIAGNOSIS — Z01.419 WOMEN'S ANNUAL ROUTINE GYNECOLOGICAL EXAMINATION: Primary | ICD-10-CM

## 2024-09-17 PROCEDURE — 3074F SYST BP LT 130 MM HG: CPT | Performed by: OBSTETRICS & GYNECOLOGY

## 2024-09-17 PROCEDURE — 3078F DIAST BP <80 MM HG: CPT | Performed by: OBSTETRICS & GYNECOLOGY

## 2024-09-17 PROCEDURE — 99396 PREV VISIT EST AGE 40-64: CPT | Performed by: OBSTETRICS & GYNECOLOGY

## 2024-09-17 ASSESSMENT — ENCOUNTER SYMPTOMS
SHORTNESS OF BREATH: 0
CONSTIPATION: 0
ABDOMINAL PAIN: 0
DIARRHEA: 0

## 2025-02-01 ENCOUNTER — HOSPITAL ENCOUNTER (OUTPATIENT)
Age: 55
Discharge: HOME | End: 2025-02-01
Payer: COMMERCIAL

## 2025-02-01 DIAGNOSIS — I10 ESSENTIAL HYPERTENSION: ICD-10-CM

## 2025-02-01 DIAGNOSIS — E11.9 TYPE 2 DIABETES MELLITUS NOT AT GOAL (HCC): ICD-10-CM

## 2025-02-01 DIAGNOSIS — E78.2 MIXED HYPERLIPIDEMIA: ICD-10-CM

## 2025-02-01 LAB
ALT SERPL-CCNC: 17 U/L (ref 10–35)
ANION GAP SERPL CALCULATED.3IONS-SCNC: 9 MMOL/L (ref 9–16)
AST SERPL-CCNC: 14 U/L (ref 10–35)
BUN SERPL-MCNC: 14 MG/DL (ref 6–20)
BUN/CREAT SERPL: 23 (ref 9–20)
CALCIUM SERPL-MCNC: 9.5 MG/DL (ref 8.6–10.4)
CHLORIDE SERPL-SCNC: 100 MMOL/L (ref 98–107)
CHOLEST SERPL-MCNC: 177 MG/DL (ref 0–199)
CHOLESTEROL/HDL RATIO: 3.2
CO2 SERPL-SCNC: 27 MMOL/L (ref 20–31)
CREAT SERPL-MCNC: 0.6 MG/DL (ref 0.5–0.9)
CREAT UR-MCNC: 59.1 MG/DL (ref 28–217)
ERYTHROCYTE [DISTWIDTH] IN BLOOD BY AUTOMATED COUNT: 13.6 % (ref 11.8–14.4)
EST. AVERAGE GLUCOSE BLD GHB EST-MCNC: 206 MG/DL
GFR, ESTIMATED: >90 ML/MIN/1.73M2
GLUCOSE SERPL-MCNC: 203 MG/DL (ref 74–99)
HBA1C MFR BLD: 8.8 % (ref 4–6)
HCT VFR BLD AUTO: 39.7 % (ref 36.3–47.1)
HDLC SERPL-MCNC: 55 MG/DL
HGB BLD-MCNC: 13 G/DL (ref 11.9–15.1)
LDLC SERPL CALC-MCNC: 98 MG/DL (ref 0–100)
MCH RBC QN AUTO: 28.3 PG (ref 25.2–33.5)
MCHC RBC AUTO-ENTMCNC: 32.7 G/DL (ref 28.4–34.8)
MCV RBC AUTO: 86.5 FL (ref 82.6–102.9)
MICROALBUMIN UR-MCNC: <12 MG/L (ref 0–20)
MICROALBUMIN/CREAT UR-RTO: NORMAL MCG/MG CREAT (ref 0–25)
NRBC BLD-RTO: 0 PER 100 WBC
PLATELET # BLD AUTO: 302 K/UL (ref 138–453)
PMV BLD AUTO: 9.8 FL (ref 8.1–13.5)
POTASSIUM SERPL-SCNC: 4.5 MMOL/L (ref 3.7–5.3)
RBC # BLD AUTO: 4.59 M/UL (ref 3.95–5.11)
SODIUM SERPL-SCNC: 136 MMOL/L (ref 136–145)
TRIGL SERPL-MCNC: 119 MG/DL
VLDLC SERPL CALC-MCNC: 24 MG/DL (ref 1–30)
WBC OTHER # BLD: 12 K/UL (ref 3.5–11.3)

## 2025-02-01 PROCEDURE — 84450 TRANSFERASE (AST) (SGOT): CPT

## 2025-02-01 PROCEDURE — 85027 COMPLETE CBC AUTOMATED: CPT

## 2025-02-01 PROCEDURE — 83036 HEMOGLOBIN GLYCOSYLATED A1C: CPT

## 2025-02-01 PROCEDURE — 82570 ASSAY OF URINE CREATININE: CPT

## 2025-02-01 PROCEDURE — 84460 ALANINE AMINO (ALT) (SGPT): CPT

## 2025-02-01 PROCEDURE — 80048 BASIC METABOLIC PNL TOTAL CA: CPT

## 2025-02-01 PROCEDURE — 80061 LIPID PANEL: CPT

## 2025-02-01 PROCEDURE — 82043 UR ALBUMIN QUANTITATIVE: CPT

## 2025-05-10 ENCOUNTER — HOSPITAL ENCOUNTER (OUTPATIENT)
Age: 55
Discharge: HOME | End: 2025-05-10
Payer: COMMERCIAL

## 2025-05-10 DIAGNOSIS — E11.9 TYPE 2 DIABETES MELLITUS NOT AT GOAL (HCC): ICD-10-CM

## 2025-05-10 LAB
ANION GAP SERPL CALCULATED.3IONS-SCNC: 12 MMOL/L (ref 9–16)
BUN SERPL-MCNC: 16 MG/DL (ref 6–20)
BUN/CREAT SERPL: 23 (ref 9–20)
CALCIUM SERPL-MCNC: 8.9 MG/DL (ref 8.6–10.4)
CHLORIDE SERPL-SCNC: 102 MMOL/L (ref 98–107)
CHOLEST SERPL-MCNC: 168 MG/DL (ref 0–199)
CHOLESTEROL/HDL RATIO: 3.1
CO2 SERPL-SCNC: 25 MMOL/L (ref 20–31)
CREAT SERPL-MCNC: 0.7 MG/DL (ref 0.5–0.9)
EST. AVERAGE GLUCOSE BLD GHB EST-MCNC: 131 MG/DL
GFR, ESTIMATED: >90 ML/MIN/1.73M2
GLUCOSE SERPL-MCNC: 166 MG/DL (ref 74–99)
HBA1C MFR BLD: 6.2 % (ref 4–6)
HDLC SERPL-MCNC: 55 MG/DL
LDLC SERPL CALC-MCNC: 87 MG/DL (ref 0–100)
POTASSIUM SERPL-SCNC: 4.1 MMOL/L (ref 3.7–5.3)
SODIUM SERPL-SCNC: 139 MMOL/L (ref 136–145)
TRIGL SERPL-MCNC: 128 MG/DL
VLDLC SERPL CALC-MCNC: 26 MG/DL (ref 1–30)

## 2025-05-10 PROCEDURE — 36415 COLL VENOUS BLD VENIPUNCTURE: CPT

## 2025-05-10 PROCEDURE — 80061 LIPID PANEL: CPT

## 2025-05-10 PROCEDURE — 80048 BASIC METABOLIC PNL TOTAL CA: CPT

## 2025-05-10 PROCEDURE — 83036 HEMOGLOBIN GLYCOSYLATED A1C: CPT

## 2025-05-28 ENCOUNTER — OFFICE VISIT (OUTPATIENT)
Dept: UROLOGY | Age: 55
End: 2025-05-28

## 2025-05-28 VITALS
HEART RATE: 79 BPM | DIASTOLIC BLOOD PRESSURE: 77 MMHG | SYSTOLIC BLOOD PRESSURE: 115 MMHG | BODY MASS INDEX: 35.61 KG/M2 | TEMPERATURE: 97.8 F | WEIGHT: 201 LBS

## 2025-05-28 DIAGNOSIS — N95.8 GENITOURINARY SYNDROME OF MENOPAUSE: ICD-10-CM

## 2025-05-28 DIAGNOSIS — N39.0 FREQUENT UTI: Primary | ICD-10-CM

## 2025-05-28 RX ORDER — ESTRADIOL 0.1 MG/G
CREAM VAGINAL
Qty: 42.5 G | Refills: 3 | Status: SHIPPED | OUTPATIENT
Start: 2025-05-28

## 2025-05-28 NOTE — PROGRESS NOTES
Bladderscan performed in office today:  Pt voided - 200 mL, PVR - 45 mL   
dander, and Dust mite extract  Family History   Problem Relation Age of Onset    Heart Disease Paternal Grandfather 50         in his early 50's from heart or aneurysm    Diabetes Maternal Grandfather     Heart Attack Maternal Grandfather 86    Hypertension Father     Kidney Cancer Father 64    Lung Cancer Father 76         at age 76 from recurrent lung cancer    Heart Surgery Father     Heart Disease Father     Diabetes Mother     Hypertension Mother     Hypertension Sister     Mental Illness Brother         schizophrenia and bipolar disorder    Hypertension Brother      Social History     Tobacco Use   Smoking Status Never   Smokeless Tobacco Never       Social History     Substance and Sexual Activity   Alcohol Use No         /77 (BP Site: Right Upper Arm, Patient Position: Sitting, BP Cuff Size: Large Adult)   Pulse 79   Temp 97.8 °F (36.6 °C)   Wt 91.2 kg (201 lb)   LMP 2021   BMI 35.61 kg/m²       PHYSICAL EXAM:  Constitutional: Patient resting comfortably, in no acute distress.   Neuro: Alert and oriented to person place and time.   Psych: Mood and affect normal.  HEENT: normocephalic, atraumatic      Lab Results   Component Value Date    BUN 16 05/10/2025     Lab Results   Component Value Date    CREATININE 0.7 05/10/2025       ASSESSMENT:   Diagnosis Orders   1. Frequent UTI        2. Genitourinary syndrome of menopause          PLAN:  Continue estradiol cream    Increase water intake    Follow-up in 1 year

## 2025-08-09 ENCOUNTER — HOSPITAL ENCOUNTER (OUTPATIENT)
Dept: LAB | Age: 55
Discharge: HOME | End: 2025-08-09
Payer: COMMERCIAL

## 2025-08-09 DIAGNOSIS — E11.65 UNCONTROLLED TYPE 2 DIABETES MELLITUS WITH HYPERGLYCEMIA (HCC): ICD-10-CM

## 2025-08-09 LAB
ALT SERPL-CCNC: 17 U/L (ref 10–35)
ANION GAP SERPL CALCULATED.3IONS-SCNC: 12 MMOL/L (ref 9–16)
AST SERPL-CCNC: 17 U/L (ref 10–35)
BUN SERPL-MCNC: 14 MG/DL (ref 6–20)
BUN/CREAT SERPL: 20 (ref 9–20)
CALCIUM SERPL-MCNC: 9.4 MG/DL (ref 8.6–10.4)
CHLORIDE SERPL-SCNC: 103 MMOL/L (ref 98–107)
CHOLEST SERPL-MCNC: 153 MG/DL (ref 0–199)
CHOLESTEROL/HDL RATIO: 3.1
CO2 SERPL-SCNC: 24 MMOL/L (ref 20–31)
CREAT SERPL-MCNC: 0.7 MG/DL (ref 0.5–0.9)
ERYTHROCYTE [DISTWIDTH] IN BLOOD BY AUTOMATED COUNT: 13.2 % (ref 11.8–14.4)
EST. AVERAGE GLUCOSE BLD GHB EST-MCNC: 146 MG/DL
GFR, ESTIMATED: >90 ML/MIN/1.73M2
GLUCOSE SERPL-MCNC: 150 MG/DL (ref 74–99)
HBA1C MFR BLD: 6.7 % (ref 4–6)
HCT VFR BLD AUTO: 39 % (ref 36.3–47.1)
HDLC SERPL-MCNC: 50 MG/DL
HGB BLD-MCNC: 12.3 G/DL (ref 11.9–15.1)
LDLC SERPL CALC-MCNC: 80 MG/DL (ref 0–100)
MCH RBC QN AUTO: 28.2 PG (ref 25.2–33.5)
MCHC RBC AUTO-ENTMCNC: 31.5 G/DL (ref 28.4–34.8)
MCV RBC AUTO: 89.4 FL (ref 82.6–102.9)
NRBC BLD-RTO: 0 PER 100 WBC
PLATELET # BLD AUTO: 284 K/UL (ref 138–453)
PMV BLD AUTO: 10.2 FL (ref 8.1–13.5)
POTASSIUM SERPL-SCNC: 4 MMOL/L (ref 3.7–5.3)
RBC # BLD AUTO: 4.36 M/UL (ref 3.95–5.11)
SODIUM SERPL-SCNC: 139 MMOL/L (ref 136–145)
TRIGL SERPL-MCNC: 114 MG/DL
VLDLC SERPL CALC-MCNC: 23 MG/DL (ref 1–30)
WBC OTHER # BLD: 9.6 K/UL (ref 3.5–11.3)

## 2025-08-09 PROCEDURE — 36415 COLL VENOUS BLD VENIPUNCTURE: CPT

## 2025-08-09 PROCEDURE — 80061 LIPID PANEL: CPT

## 2025-08-09 PROCEDURE — 84450 TRANSFERASE (AST) (SGOT): CPT

## 2025-08-09 PROCEDURE — 80048 BASIC METABOLIC PNL TOTAL CA: CPT

## 2025-08-09 PROCEDURE — 84460 ALANINE AMINO (ALT) (SGPT): CPT

## 2025-08-09 PROCEDURE — 83036 HEMOGLOBIN GLYCOSYLATED A1C: CPT

## 2025-08-09 PROCEDURE — 85027 COMPLETE CBC AUTOMATED: CPT

## (undated) DEVICE — Z DISCONTINUED APPLICATOR SURG PREP 0.35OZ 2% CHG 70% ISO ALC W/ HI LT

## (undated) DEVICE — DRESSING SURESITE-123PLUSPAD 2.4 IN X2.8 IN

## (undated) DEVICE — ARM DRAPE

## (undated) DEVICE — SUTURE MCRYL SZ 4-0 L27IN ABSRB UD L19MM PS-2 1/2 CIR PRIM Y426H

## (undated) DEVICE — CANNULA SEAL

## (undated) DEVICE — Z DISCONTINUED USE 2272114 DRAPE SURG UTIL 26X15 IN W/ TAPE N INVASIVE MULTLYR DISP

## (undated) DEVICE — SINGLE-USE BIOPSY FORCEPS: Brand: RADIAL JAW 4

## (undated) DEVICE — Device

## (undated) DEVICE — GLOVE SURG SZ 65 L12IN FNGR THK87MIL WHT LTX FREE

## (undated) DEVICE — SUTURE DEV SZ 2-0 WND CLSR ABSRB GS-22 VLOC COVIDIEN VLOCM2145

## (undated) DEVICE — TUBING, SUCTION, 9/32" X 12', STRAIGHT: Brand: MEDLINE INDUSTRIES, INC.

## (undated) DEVICE — Z DISCONTINUED BY MEDLINE USE 2711682 TRAY SKIN PREP DRY W/ PREM GLV

## (undated) DEVICE — PAD,ABDOMINAL,8"X10",ST,LF: Brand: MEDLINE

## (undated) DEVICE — TUBING SUCT NON-STRL 9/32X100 W/CNNT

## (undated) DEVICE — ELECTRO LUBE IS A SINGLE PATIENT USE DEVICE THAT IS INTENDED TO BE USED ON ELECTROSURGICAL ELECTRODES TO REDUCE STICKING.: Brand: KEY SURGICAL ELECTRO LUBE

## (undated) DEVICE — INSUFFLATION NEEDLE TO ESTABLISH PNEUMOPERITONEUM.: Brand: INSUFFLATION NEEDLE

## (undated) DEVICE — PREP PAD BNS: Brand: CONVERTORS

## (undated) DEVICE — CANNULA ORAL NSL AD CO2 N INTUB O2 DEL DISP TRU LNK

## (undated) DEVICE — PACK,LITHOTOMY: Brand: MEDLINE

## (undated) DEVICE — GLOVE ORANGE PI 8   MSG9080

## (undated) DEVICE — APPLICATOR MEDICATED 26 CC SOLUTION HI LT ORNG CHLORAPREP

## (undated) DEVICE — COVER LT HNDL BLU PLAS

## (undated) DEVICE — LAVH-LF: Brand: MEDLINE INDUSTRIES, INC.

## (undated) DEVICE — ELECTRODE ES AD CRD L15FT DISP FOR PT BELOW 30LB REM

## (undated) DEVICE — SEALER ENDOSCP L37CM NANO COAT BLNT TIP LAP DIV

## (undated) DEVICE — GOWN,AURORA,NON-REINFORCED,2XL: Brand: MEDLINE

## (undated) DEVICE — SOLUTION IRRIG 1000ML 0.9% SOD CHL USP POUR PLAS BTL

## (undated) DEVICE — Z DISCONTINUED NO SUB IDED DEVICE ABLAT ENDOMET UTER SOUNDING ES SURESOUND NOVASURE

## (undated) DEVICE — 20 ML SYRINGE LUER-LOCK TIP: Brand: MONOJECT

## (undated) DEVICE — TOWEL,OR,DSP,ST,BLUE,STD,4/PK,20PK/CS: Brand: MEDLINE

## (undated) DEVICE — SPONGE LAP W18XL18IN WHT COT 4 PLY FLD STRUNG RADPQ DISP ST

## (undated) DEVICE — PEN: MARKING STD 100/CS: Brand: MEDICAL ACTION INDUSTRIES

## (undated) DEVICE — MASTISOL ADHESIVE LIQ 2/3ML

## (undated) DEVICE — SUTURE CHROMIC GUT SZ 3-0 L27IN ABSRB BRN L26MM SH 1/2 CIR G122H

## (undated) DEVICE — SOLUTION SURG PREP ANTIMICROBIAL 4 OZ SKIN WND EXIDINE

## (undated) DEVICE — SUTURE MCRYL + SZ 4-0 L27IN ABSRB UD L19MM PS-2 3/8 CIR MCP426H

## (undated) DEVICE — DRESSING HEMSTAT W1X2IN ABSRB SURGCEL SNOW

## (undated) DEVICE — DRAPE SURG LITH HYSTSCP D AND C STD N FEN N REINF W FLD PCH

## (undated) DEVICE — Device: Brand: UTERINE ELEVATOR PRO

## (undated) DEVICE — [HIGH FLOW INSUFFLATOR,  DO NOT USE IF PACKAGE IS DAMAGED,  KEEP DRY,  KEEP AWAY FROM SUNLIGHT,  PROTECT FROM HEAT AND RADIOACTIVE SOURCES.]: Brand: PNEUMOSURE

## (undated) DEVICE — DRESSING,GAUZE,PETROLATUM,CURAD,3"X9",ST: Brand: CURAD

## (undated) DEVICE — GAUZE,SPONGE,4"X4",16PLY,XRAY,STRL,LF: Brand: MEDLINE

## (undated) DEVICE — UNDERPAD INCONT 2XL FOR 38-58IN WAIST MESH PROTCT + DISP

## (undated) DEVICE — SYRINGE MED 10ML LUERLOCK TIP W/O SFTY DISP

## (undated) DEVICE — TIP COVER ACCESSORY

## (undated) DEVICE — COVER,MAYO STAND,STERILE: Brand: MEDLINE

## (undated) DEVICE — SOLUTION IV IRRIG POUR BRL 0.9% SODIUM CHL 2F7124

## (undated) DEVICE — 40586 ADVANCED TRENDELENBURG POSITIONING KIT: Brand: 40586 ADVANCED TRENDELENBURG POSITIONING KIT

## (undated) DEVICE — BLADELESS OBTURATOR: Brand: WECK VISTA

## (undated) DEVICE — GOWN,SIRUS,POLYRNF,BRTHSLV,XL,30/CS: Brand: MEDLINE

## (undated) DEVICE — PAD N ADH W3XL4IN POLY COT SFT PERF FLM EASILY CUT ABSRB

## (undated) DEVICE — DRESSING TRNSPAR W8XL12IN FLM SURESITE 123

## (undated) DEVICE — UNDERPANTS INCONT XL 45-70IN KNIT SEAMLESS DSGN COLOR-CODED

## (undated) DEVICE — TOTAL TRAY, DB, 100% SILI FOLEY, 16FR 10: Brand: MEDLINE

## (undated) DEVICE — WARMER SCP 2 ANTIFOG LAP DISP

## (undated) DEVICE — CATHETER URETH 16FR L16IN RED RUB INTMIT ROB MOD BARDX

## (undated) DEVICE — Y-TYPE TUR/BLADDER IRRIGATION SET, REGULATING CLAMP

## (undated) DEVICE — STRIP,CLOSURE,WOUND,MEDI-STRIP,1/2X4: Brand: MEDLINE

## (undated) DEVICE — MATERNITY KNIT PANTS,SEAMLESS: Brand: WINGS

## (undated) DEVICE — SOLUTION IV 1000ML 0.9% SOD CHL FOR IRRIG PLAS CONT